# Patient Record
Sex: FEMALE | Race: BLACK OR AFRICAN AMERICAN | Employment: UNEMPLOYED | URBAN - METROPOLITAN AREA
[De-identification: names, ages, dates, MRNs, and addresses within clinical notes are randomized per-mention and may not be internally consistent; named-entity substitution may affect disease eponyms.]

---

## 2017-03-13 ENCOUNTER — APPOINTMENT (OUTPATIENT)
Dept: GENERAL RADIOLOGY | Age: 42
End: 2017-03-13
Attending: EMERGENCY MEDICINE
Payer: MEDICARE

## 2017-03-13 ENCOUNTER — HOSPITAL ENCOUNTER (EMERGENCY)
Age: 42
Discharge: HOME | End: 2017-03-13
Attending: EMERGENCY MEDICINE
Payer: MEDICARE

## 2017-03-13 VITALS
BODY MASS INDEX: 33.75 KG/M2 | RESPIRATION RATE: 16 BRPM | WEIGHT: 210 LBS | SYSTOLIC BLOOD PRESSURE: 144 MMHG | HEIGHT: 66 IN | TEMPERATURE: 98.7 F | DIASTOLIC BLOOD PRESSURE: 88 MMHG | HEART RATE: 136 BPM | OXYGEN SATURATION: 96 %

## 2017-03-13 DIAGNOSIS — K08.89 TOOTHACHE: Primary | ICD-10-CM

## 2017-03-13 DIAGNOSIS — R07.9 ACUTE CHEST PAIN: ICD-10-CM

## 2017-03-13 LAB
ALBUMIN SERPL BCP-MCNC: 3.9 G/DL (ref 3.5–5)
ALBUMIN/GLOB SERPL: 1 {RATIO} (ref 1.1–2.2)
ALP SERPL-CCNC: 78 U/L (ref 45–117)
ALT SERPL-CCNC: 27 U/L (ref 12–78)
ANION GAP BLD CALC-SCNC: 8 MMOL/L (ref 5–15)
AST SERPL W P-5'-P-CCNC: 22 U/L (ref 15–37)
ATRIAL RATE: 96 BPM
BASOPHILS # BLD AUTO: 0 K/UL (ref 0–0.1)
BASOPHILS # BLD: 0 % (ref 0–1)
BILIRUB SERPL-MCNC: 0.4 MG/DL (ref 0.2–1)
BUN SERPL-MCNC: 7 MG/DL (ref 6–20)
BUN/CREAT SERPL: 9 (ref 12–20)
CALCIUM SERPL-MCNC: 8.9 MG/DL (ref 8.5–10.1)
CALCULATED P AXIS, ECG09: 45 DEGREES
CALCULATED R AXIS, ECG10: -24 DEGREES
CALCULATED T AXIS, ECG11: 73 DEGREES
CHLORIDE SERPL-SCNC: 97 MMOL/L (ref 97–108)
CK MB CFR SERPL CALC: 0.5 % (ref 0–2.5)
CK MB SERPL-MCNC: 1.4 NG/ML (ref 5–25)
CK SERPL-CCNC: 273 U/L (ref 26–192)
CO2 SERPL-SCNC: 29 MMOL/L (ref 21–32)
CREAT SERPL-MCNC: 0.77 MG/DL (ref 0.55–1.02)
DIAGNOSIS, 93000: NORMAL
EOSINOPHIL # BLD: 0.1 K/UL (ref 0–0.4)
EOSINOPHIL NFR BLD: 1 % (ref 0–7)
ERYTHROCYTE [DISTWIDTH] IN BLOOD BY AUTOMATED COUNT: 12.3 % (ref 11.5–14.5)
GLOBULIN SER CALC-MCNC: 4.1 G/DL (ref 2–4)
GLUCOSE SERPL-MCNC: 100 MG/DL (ref 65–100)
HCT VFR BLD AUTO: 41.4 % (ref 35–47)
HGB BLD-MCNC: 13.2 G/DL (ref 11.5–16)
LYMPHOCYTES # BLD AUTO: 39 % (ref 12–49)
LYMPHOCYTES # BLD: 2.5 K/UL (ref 0.8–3.5)
MCH RBC QN AUTO: 28.3 PG (ref 26–34)
MCHC RBC AUTO-ENTMCNC: 31.9 G/DL (ref 30–36.5)
MCV RBC AUTO: 88.7 FL (ref 80–99)
MONOCYTES # BLD: 0.3 K/UL (ref 0–1)
MONOCYTES NFR BLD AUTO: 5 % (ref 5–13)
NEUTS SEG # BLD: 3.5 K/UL (ref 1.8–8)
NEUTS SEG NFR BLD AUTO: 55 % (ref 32–75)
P-R INTERVAL, ECG05: 202 MS
PLATELET # BLD AUTO: 263 K/UL (ref 150–400)
POTASSIUM SERPL-SCNC: 2.9 MMOL/L (ref 3.5–5.1)
PROT SERPL-MCNC: 8 G/DL (ref 6.4–8.2)
Q-T INTERVAL, ECG07: 356 MS
QRS DURATION, ECG06: 90 MS
QTC CALCULATION (BEZET), ECG08: 449 MS
RBC # BLD AUTO: 4.67 M/UL (ref 3.8–5.2)
SODIUM SERPL-SCNC: 134 MMOL/L (ref 136–145)
TROPONIN I SERPL-MCNC: <0.04 NG/ML
VENTRICULAR RATE, ECG03: 96 BPM
WBC # BLD AUTO: 6.4 K/UL (ref 3.6–11)

## 2017-03-13 PROCEDURE — 82550 ASSAY OF CK (CPK): CPT | Performed by: EMERGENCY MEDICINE

## 2017-03-13 PROCEDURE — 80053 COMPREHEN METABOLIC PANEL: CPT | Performed by: EMERGENCY MEDICINE

## 2017-03-13 PROCEDURE — 99285 EMERGENCY DEPT VISIT HI MDM: CPT

## 2017-03-13 PROCEDURE — 93005 ELECTROCARDIOGRAM TRACING: CPT

## 2017-03-13 PROCEDURE — 85025 COMPLETE CBC W/AUTO DIFF WBC: CPT | Performed by: EMERGENCY MEDICINE

## 2017-03-13 PROCEDURE — 36415 COLL VENOUS BLD VENIPUNCTURE: CPT | Performed by: EMERGENCY MEDICINE

## 2017-03-13 PROCEDURE — 74011250637 HC RX REV CODE- 250/637: Performed by: EMERGENCY MEDICINE

## 2017-03-13 PROCEDURE — 71020 XR CHEST PA LAT: CPT

## 2017-03-13 PROCEDURE — 84484 ASSAY OF TROPONIN QUANT: CPT | Performed by: EMERGENCY MEDICINE

## 2017-03-13 PROCEDURE — 82553 CREATINE MB FRACTION: CPT | Performed by: EMERGENCY MEDICINE

## 2017-03-13 RX ORDER — HYDROCODONE BITARTRATE AND ACETAMINOPHEN 5; 325 MG/1; MG/1
1 TABLET ORAL
Qty: 20 TAB | Refills: 0 | Status: SHIPPED | OUTPATIENT
Start: 2017-03-13 | End: 2018-12-24

## 2017-03-13 RX ORDER — TRAZODONE HYDROCHLORIDE 150 MG/1
150 TABLET ORAL
COMMUNITY

## 2017-03-13 RX ORDER — AZITHROMYCIN 250 MG/1
250 TABLET, FILM COATED ORAL DAILY
COMMUNITY
Start: 2017-03-06 | End: 2017-03-14

## 2017-03-13 RX ORDER — AMLODIPINE BESYLATE 5 MG/1
5 TABLET ORAL 2 TIMES DAILY
COMMUNITY
End: 2021-09-14 | Stop reason: CLARIF

## 2017-03-13 RX ORDER — HYDROCODONE BITARTRATE AND ACETAMINOPHEN 7.5; 325 MG/1; MG/1
1 TABLET ORAL
Status: COMPLETED | OUTPATIENT
Start: 2017-03-13 | End: 2017-03-13

## 2017-03-13 RX ADMIN — HYDROCODONE BITARTRATE AND ACETAMINOPHEN 1 TABLET: 7.5; 325 TABLET ORAL at 16:37

## 2017-03-13 NOTE — ED PROVIDER NOTES
HPI Comments: 40 y.o. female with past medical history significant for HTN, seizures, murmur, and cholecystectomy who presents from home with chief complaint of chest pain. Pt c/o L sided chest pain that lasts about 10 minutes followed by  heart racing and a pain that shoots up into her head causing a HA. Pt also c/o bilateral dental pain on her upper and lower molars, back pain, and a tingling sensation in her R arm. Pt was seen at [facility] a week ago and dx with hypertension. Pt claims she's been taking zpak for her teeth, but it hasn't helped with the pain. Pt was seen at [facility] 2 weeks ago and had x-rays taken but hasn't been called back yet. Pt has been taking 800 mg ibuprofen and PM headache medicine for her pain. Pt claims her clonodine was recently changed from 3mg to 1 mg, and denies filling her medication for her heart prescribed at [facility]. Pt denies seeing or talking to her PCP about her current symptoms. There are no other acute medical concerns at this time. PCP: Lois Robert MD    Note written by Mindy Egan, as dictated by Santos Stinson MD 4:40 PM      The history is provided by the patient. No  was used. Past Medical History:   Diagnosis Date    Hypertension     Murmur     Seizures ([address]        Past Surgical History:   Procedure Laterality Date    HX CHOLECYSTECTOMY           History reviewed. No pertinent family history. Social History     Social History    Marital status: SINGLE     Spouse name: N/A    Number of children: N/A    Years of education: N/A     Occupational History    Not on file. Social History Main Topics    Smoking status: Current Every Day Smoker    Smokeless tobacco: Not on file    Alcohol use Yes    Drug use: No    Sexual activity: Not on file     Other Topics Concern    Not on file     Social History Narrative         ALLERGIES: Review of patient's allergies indicates no known allergies.     Review of Systems   Constitutional: Negative for appetite change, chills and fever. HENT: Positive for dental problem (Teeth pain upper and lower bilaterally). Negative for rhinorrhea, sore throat and trouble swallowing. Eyes: Negative for photophobia. Respiratory: Negative for cough and shortness of breath. Cardiovascular: Positive for chest pain (Stabbing, heart racing). Negative for palpitations. Gastrointestinal: Negative for abdominal pain, nausea and vomiting. Genitourinary: Negative for dysuria, frequency and hematuria. Musculoskeletal: Positive for back pain. Negative for arthralgias. Neurological: Positive for headaches. Negative for dizziness, syncope and weakness. Tingling in R arm   Psychiatric/Behavioral: Negative for behavioral problems. The patient is not nervous/anxious. All other systems reviewed and are negative. Vitals:    03/13/17 1435   BP: 132/84   Pulse: (!) 136   Resp: 16   Temp: 98.7 °F (37.1 °C)   SpO2: 97%   Weight: 95.3 kg (210 lb)   Height: 5' 6\" (1.676 m)            Physical Exam   Constitutional: She appears well-developed and well-nourished. No distress. Appears to be in pain but no distress   HENT:   Head: Normocephalic and atraumatic. Mouth/Throat: Oropharynx is clear and moist.   Necrotic roots of R upper and lower proximal molars and L upper molars showing   Eyes: EOM are normal. Pupils are equal, round, and reactive to light. Neck: Normal range of motion. Neck supple. Cardiovascular: Normal rate, regular rhythm, normal heart sounds and intact distal pulses. Exam reveals no gallop and no friction rub. No murmur heard. Pulmonary/Chest: Effort normal. No respiratory distress. She has no wheezes. She has no rales. Abdominal: Soft. There is no tenderness. There is no rebound. Musculoskeletal: Normal range of motion. She exhibits no tenderness. Neurological: She is alert. No cranial nerve deficit. Motor; symmetric   Skin: No erythema. Psychiatric: She has a normal mood and affect. Her behavior is normal.   Nursing note and vitals reviewed. [facility]  ED Course       Procedures      ED EKG interpretation:  Rhythm: normal sinus rhythm; and regular . Rate (approx.): 95; Axis: normal; P wave: normal; QRS interval: normal ; ST/T wave: non-specific changes; in  Lead: ; Other findings: left ventricular hypertrophy. This EKG was interpreted by Enzo Putnam MD,ED Provider.  4:13 PM

## 2017-03-13 NOTE — ED TRIAGE NOTES
EMS was called for pt complaining of left chest pain with radiation to her right chest wall and down her right lateral chest wall with increased pain on inspiration. Pt is coughing up green sputum. Pt was seen at [facility] this past week with diagnosis of headache and palpitations and tooth abscess and has not taken any of the prescribed medications. Pt has a white vaginal discharge with urinary frequency that has started this past week.

## 2017-03-13 NOTE — ED NOTES
Pt upset & crying & not wanting to leave hospital because she \"wants something stronger for pain\". Spoke with Dr. Marte Fear & no further orders received. Pt given prescription for Norco & needs to follow up with dentist. Resources given.

## 2017-03-13 NOTE — DISCHARGE INSTRUCTIONS
Chest Pain: Care Instructions  Your Care Instructions  There are many things that can cause chest pain. Some are not serious and will get better on their own in a few days. But some kinds of chest pain need more testing and treatment. Your doctor may have recommended a follow-up visit in the next 8 to 12 hours. If you are not getting better, you may need more tests or treatment. Even though your doctor has released you, you still need to watch for any problems. The doctor carefully checked you, but sometimes problems can develop later. If you have new symptoms or if your symptoms do not get better, get medical care right away. If you have worse or different chest pain or pressure that lasts more than 5 minutes or you passed out (lost consciousness), call 911 or seek other emergency help right away. A medical visit is only one step in your treatment. Even if you feel better, you still need to do what your doctor recommends, such as going to all suggested follow-up appointments and taking medicines exactly as directed. This will help you recover and help prevent future problems. How can you care for yourself at home? · Rest until you feel better. · Take your medicine exactly as prescribed. Call your doctor if you think you are having a problem with your medicine. · Do not drive after taking a prescription pain medicine. When should you call for help? Call 911 if:  · You passed out (lost consciousness). · You have severe difficulty breathing. · You have symptoms of a heart attack. These may include:  ¨ Chest pain or pressure, or a strange feeling in your chest.  ¨ Sweating. ¨ Shortness of breath. ¨ Nausea or vomiting. ¨ Pain, pressure, or a strange feeling in your back, neck, jaw, or upper belly or in one or both shoulders or arms. ¨ Lightheadedness or sudden weakness. ¨ A fast or irregular heartbeat.   After you call 911, the  may tell you to chew 1 adult-strength or 2 to 4 low-dose aspirin. Wait for an ambulance. Do not try to drive yourself. Call your doctor today if:  · You have any trouble breathing. · Your chest pain gets worse. · You are dizzy or lightheaded, or you feel like you may faint. · You are not getting better as expected. · You are having new or different chest pain. Where can you learn more? Go to http://fiona-pamela.info/. Enter A120 in the search box to learn more about \"Chest Pain: Care Instructions. \"  Current as of: May 27, 2016  Content Version: 11.1  © 9771-6692 [employer]. Care instructions adapted under license by [employer] (which disclaims liability or warranty for this information). If you have questions about a medical condition or this instruction, always ask your healthcare professional. [address] any warranty or liability for your use of this information. We hope that we have addressed all of your medical concerns. The examination and treatment you received in the Emergency Department were for an emergent problem and were not intended as complete care. It is important that you follow up with your healthcare provider(s) for ongoing care. If your symptoms worsen or do not improve as expected, and you are unable to reach your usual health care provider(s), you should return to the Emergency Department. Today's healthcare is undergoing tremendous change, and patient satisfaction surveys are one of the many tools to assess the quality of medical care. You may receive a survey from the [employer] organization regarding your experience in the Emergency Department. I hope that your experience has been completely positive, particularly the medical care that I provided. As such, please participate in the survey; anything less than excellent does not meet my expectations or intentions.         [facility] and [facility] participate in nationally recognized quality of care measures. If your blood pressure is greater than 120/80, as reported below, we urge that you seek medical care to address the potential of high blood pressure, commonly known as hypertension. Hypertension can be hereditary or can be caused by certain medical conditions, pain, stress, or \"white coat syndrome. \"       Please make an appointment with your health care provider(s) for follow up of your Emergency Department visit. VITALS:   Patient Vitals for the past 8 hrs:   Temp Pulse Resp BP SpO2   03/13/17 1435 98.7 °F (37.1 °C) (!) 136 16 132/84 97 %          Thank you for allowing us to provide you with medical care today. We realize that you have many choices for your emergency care needs. Please choose us in the future for any continued health care needs. Tulio Diaz MD    [address] [place] [address].   Office: [phone]            Recent Results (from the past 24 hour(s))   TROPONIN I    Collection Time: 03/13/17  2:46 PM   Result Value Ref Range    Troponin-I, Qt. <0.04 <0.05 ng/mL   CK W/ REFLX CKMB    Collection Time: 03/13/17  2:46 PM   Result Value Ref Range     (H) 26 - 192 U/L   CBC WITH AUTOMATED DIFF    Collection Time: 03/13/17  2:46 PM   Result Value Ref Range    WBC 6.4 3.6 - 11.0 K/uL    RBC 4.67 3.80 - 5.20 M/uL    HGB 13.2 11.5 - 16.0 g/dL    HCT 41.4 35.0 - 47.0 %    MCV 88.7 80.0 - 99.0 FL    MCH 28.3 26.0 - 34.0 PG    MCHC 31.9 30.0 - 36.5 g/dL    RDW 12.3 11.5 - 14.5 %    PLATELET 297 063 - 972 K/uL    NEUTROPHILS 55 32 - 75 %    LYMPHOCYTES 39 12 - 49 %    MONOCYTES 5 5 - 13 %    EOSINOPHILS 1 0 - 7 %    BASOPHILS 0 0 - 1 %    ABS. NEUTROPHILS 3.5 1.8 - 8.0 K/UL    ABS. LYMPHOCYTES 2.5 0.8 - 3.5 K/UL    ABS. MONOCYTES 0.3 0.0 - 1.0 K/UL    ABS. EOSINOPHILS 0.1 0.0 - 0.4 K/UL    ABS.  BASOPHILS 0.0 0.0 - 0.1 K/UL   METABOLIC PANEL, COMPREHENSIVE    Collection Time: 03/13/17  2:46 PM   Result Value Ref Range    Sodium 134 (L) 136 - 145 mmol/L    Potassium 2.9 (L) 3.5 - 5.1 mmol/L    Chloride 97 97 - 108 mmol/L    CO2 29 21 - 32 mmol/L    Anion gap 8 5 - 15 mmol/L    Glucose 100 65 - 100 mg/dL    BUN 7 6 - 20 MG/DL    Creatinine 0.77 0.55 - 1.02 MG/DL    BUN/Creatinine ratio 9 (L) 12 - 20      GFR est AA >60 >60 ml/min/1.73m2    GFR est non-AA >60 >60 ml/min/1.73m2    Calcium 8.9 8.5 - 10.1 MG/DL    Bilirubin, total 0.4 0.2 - 1.0 MG/DL    ALT (SGPT) 27 12 - 78 U/L    AST (SGOT) 22 15 - 37 U/L    Alk. phosphatase 78 45 - 117 U/L    Protein, total 8.0 6.4 - 8.2 g/dL    Albumin 3.9 3.5 - 5.0 g/dL    Globulin 4.1 (H) 2.0 - 4.0 g/dL    A-G Ratio 1.0 (L) 1.1 - 2.2     CK-MB,QUANT. Collection Time: 03/13/17  2:46 PM   Result Value Ref Range    CK - MB 1.4 <3.6 NG/ML    CK-MB Index 0.5 0 - 2.5     EKG, 12 LEAD, INITIAL    Collection Time: 03/13/17  2:50 PM   Result Value Ref Range    Ventricular Rate 96 BPM    Atrial Rate 96 BPM    P-R Interval 202 ms    QRS Duration 90 ms    Q-T Interval 356 ms    QTC Calculation (Bezet) 449 ms    Calculated P Axis 45 degrees    Calculated R Axis -24 degrees    Calculated T Axis 73 degrees    Diagnosis       Normal sinus rhythm  Voltage criteria for left ventricular hypertrophy  Nonspecific T wave abnormality  No previous ECGs available  Confirmed by Eduarda Zelaya M.D., [facility] ([id number]) on 3/13/2017 4:01:06 PM         Xr Chest Pa Lat    Result Date: 3/13/2017  EXAM:  XR CHEST PA LAT INDICATION:   cough, chest pain COMPARISON: None. FINDINGS: PA and lateral radiographs of the chest demonstrate clear lungs. The cardiac and mediastinal contours and pulmonary vascularity are normal.  The bones and soft tissues are within normal limits. IMPRESSION: No acute abnormality is identified.

## 2018-12-23 ENCOUNTER — HOSPITAL ENCOUNTER (EMERGENCY)
Age: 43
Discharge: HOME | End: 2018-12-23
Attending: EMERGENCY MEDICINE | Admitting: EMERGENCY MEDICINE
Payer: MEDICARE

## 2018-12-23 VITALS
TEMPERATURE: 98 F | BODY MASS INDEX: 40.75 KG/M2 | WEIGHT: 230 LBS | OXYGEN SATURATION: 100 % | HEART RATE: 84 BPM | SYSTOLIC BLOOD PRESSURE: 135 MMHG | HEIGHT: 63 IN | DIASTOLIC BLOOD PRESSURE: 84 MMHG | RESPIRATION RATE: 18 BRPM

## 2018-12-23 DIAGNOSIS — R55 SYNCOPE AND COLLAPSE: Primary | ICD-10-CM

## 2018-12-23 LAB
ALBUMIN SERPL-MCNC: 4.2 G/DL (ref 3.5–5)
ALBUMIN/GLOB SERPL: 1 {RATIO} (ref 1.1–2.2)
ALP SERPL-CCNC: 80 U/L (ref 45–117)
ALT SERPL-CCNC: 33 U/L (ref 12–78)
ANION GAP SERPL CALC-SCNC: 14 MMOL/L (ref 5–15)
APPEARANCE UR: CLEAR
AST SERPL-CCNC: 26 U/L (ref 15–37)
ATRIAL RATE: 67 BPM
BACTERIA URNS QL MICRO: NEGATIVE /HPF
BASOPHILS # BLD: 0 K/UL (ref 0–0.1)
BASOPHILS NFR BLD: 1 % (ref 0–1)
BILIRUB SERPL-MCNC: 0.3 MG/DL (ref 0.2–1)
BILIRUB UR QL: NEGATIVE
BUN SERPL-MCNC: 7 MG/DL (ref 6–20)
BUN/CREAT SERPL: 8 (ref 12–20)
CALCIUM SERPL-MCNC: 9.5 MG/DL (ref 8.5–10.1)
CALCULATED P AXIS, ECG09: 31 DEGREES
CALCULATED R AXIS, ECG10: -8 DEGREES
CALCULATED T AXIS, ECG11: 28 DEGREES
CHLORIDE SERPL-SCNC: 102 MMOL/L (ref 97–108)
CK SERPL-CCNC: 191 U/L (ref 26–192)
CO2 SERPL-SCNC: 23 MMOL/L (ref 21–32)
COLOR UR: ABNORMAL
CREAT SERPL-MCNC: 0.89 MG/DL (ref 0.55–1.02)
DIAGNOSIS, 93000: NORMAL
DIFFERENTIAL METHOD BLD: NORMAL
EOSINOPHIL # BLD: 0.1 K/UL (ref 0–0.4)
EOSINOPHIL NFR BLD: 1 % (ref 0–7)
EPITH CASTS URNS QL MICRO: ABNORMAL /LPF
ERYTHROCYTE [DISTWIDTH] IN BLOOD BY AUTOMATED COUNT: 12 % (ref 11.5–14.5)
GLOBULIN SER CALC-MCNC: 4.1 G/DL (ref 2–4)
GLUCOSE SERPL-MCNC: 102 MG/DL (ref 65–100)
GLUCOSE UR STRIP.AUTO-MCNC: NEGATIVE MG/DL
HCG UR QL: NEGATIVE
HCT VFR BLD AUTO: 37.4 % (ref 35–47)
HGB BLD-MCNC: 12.9 G/DL (ref 11.5–16)
HGB UR QL STRIP: ABNORMAL
IMM GRANULOCYTES # BLD: 0 K/UL (ref 0–0.04)
IMM GRANULOCYTES NFR BLD AUTO: 0 % (ref 0–0.5)
KETONES UR QL STRIP.AUTO: NEGATIVE MG/DL
LEUKOCYTE ESTERASE UR QL STRIP.AUTO: NEGATIVE
LIPASE SERPL-CCNC: 78 U/L (ref 73–393)
LYMPHOCYTES # BLD: 2.7 K/UL (ref 0.8–3.5)
LYMPHOCYTES NFR BLD: 31 % (ref 12–49)
MCH RBC QN AUTO: 29.8 PG (ref 26–34)
MCHC RBC AUTO-ENTMCNC: 34.5 G/DL (ref 30–36.5)
MCV RBC AUTO: 86.4 FL (ref 80–99)
MONOCYTES # BLD: 0.4 K/UL (ref 0–1)
MONOCYTES NFR BLD: 5 % (ref 5–13)
NEUTS SEG # BLD: 5.4 K/UL (ref 1.8–8)
NEUTS SEG NFR BLD: 63 % (ref 32–75)
NITRITE UR QL STRIP.AUTO: NEGATIVE
NRBC # BLD: 0 K/UL (ref 0–0.01)
NRBC BLD-RTO: 0 PER 100 WBC
P-R INTERVAL, ECG05: 222 MS
PH UR STRIP: 6 [PH] (ref 5–8)
PLATELET # BLD AUTO: 335 K/UL (ref 150–400)
PMV BLD AUTO: 11 FL (ref 8.9–12.9)
POTASSIUM SERPL-SCNC: 3.6 MMOL/L (ref 3.5–5.1)
PROT SERPL-MCNC: 8.3 G/DL (ref 6.4–8.2)
PROT UR STRIP-MCNC: NEGATIVE MG/DL
Q-T INTERVAL, ECG07: 408 MS
QRS DURATION, ECG06: 90 MS
QTC CALCULATION (BEZET), ECG08: 431 MS
RBC # BLD AUTO: 4.33 M/UL (ref 3.8–5.2)
RBC #/AREA URNS HPF: ABNORMAL /HPF (ref 0–5)
SODIUM SERPL-SCNC: 139 MMOL/L (ref 136–145)
SP GR UR REFRACTOMETRY: <1.005 (ref 1–1.03)
UA: UC IF INDICATED,UAUC: ABNORMAL
UROBILINOGEN UR QL STRIP.AUTO: 0.2 EU/DL (ref 0.2–1)
VENTRICULAR RATE, ECG03: 67 BPM
WBC # BLD AUTO: 8.7 K/UL (ref 3.6–11)
WBC URNS QL MICRO: ABNORMAL /HPF (ref 0–4)

## 2018-12-23 PROCEDURE — 81001 URINALYSIS AUTO W/SCOPE: CPT

## 2018-12-23 PROCEDURE — 80053 COMPREHEN METABOLIC PANEL: CPT

## 2018-12-23 PROCEDURE — 74011250637 HC RX REV CODE- 250/637: Performed by: EMERGENCY MEDICINE

## 2018-12-23 PROCEDURE — 74011250636 HC RX REV CODE- 250/636: Performed by: EMERGENCY MEDICINE

## 2018-12-23 PROCEDURE — 93005 ELECTROCARDIOGRAM TRACING: CPT

## 2018-12-23 PROCEDURE — 85025 COMPLETE CBC W/AUTO DIFF WBC: CPT

## 2018-12-23 PROCEDURE — 83690 ASSAY OF LIPASE: CPT

## 2018-12-23 PROCEDURE — 96360 HYDRATION IV INFUSION INIT: CPT

## 2018-12-23 PROCEDURE — 36415 COLL VENOUS BLD VENIPUNCTURE: CPT

## 2018-12-23 PROCEDURE — 99285 EMERGENCY DEPT VISIT HI MDM: CPT

## 2018-12-23 PROCEDURE — 82550 ASSAY OF CK (CPK): CPT

## 2018-12-23 PROCEDURE — 81025 URINE PREGNANCY TEST: CPT

## 2018-12-23 RX ORDER — BUTALBITAL, ACETAMINOPHEN AND CAFFEINE 50; 325; 40 MG/1; MG/1; MG/1
1 TABLET ORAL
Status: COMPLETED | OUTPATIENT
Start: 2018-12-23 | End: 2018-12-23

## 2018-12-23 RX ORDER — MECLIZINE HCL 12.5 MG 12.5 MG/1
25 TABLET ORAL
Status: COMPLETED | OUTPATIENT
Start: 2018-12-23 | End: 2018-12-23

## 2018-12-23 RX ADMIN — SODIUM CHLORIDE 1000 ML: 900 INJECTION, SOLUTION INTRAVENOUS at 14:50

## 2018-12-23 RX ADMIN — BUTALBITAL, ACETAMINOPHEN, AND CAFFEINE 1 TABLET: 50; 325; 40 TABLET ORAL at 15:39

## 2018-12-23 RX ADMIN — MECLIZINE 25 MG: 12.5 TABLET ORAL at 16:42

## 2018-12-23 NOTE — ED TRIAGE NOTES
EMS reports called for a witnessed seizure. Patient says she had a seizure lasting \"2 minutes\" and witnessed by a friend. Was \"almost\" incont. States stopped taking Dilantin four years ago. When asked says seizures occur with \"stress\". States has a cold this week. Asking for a meal because she has not eaten \"in three days\" due to diarrhea.

## 2018-12-23 NOTE — ED NOTES
Patient resting on stretcher crying. Reports dizzy sensation when ambulating though able to ambulate to rest room without difficulty.   MD notified

## 2018-12-23 NOTE — ED NOTES

## 2018-12-23 NOTE — DISCHARGE INSTRUCTIONS
Lightheadedness or Faintness: Care Instructions  Your Care Instructions  Lightheadedness is a feeling that you are about to faint or \"pass out. \" You do not feel as if you or your surroundings are moving. It is different from vertigo, which is the feeling that you or things around you are spinning or tilting. Lightheadedness usually goes away or gets better when you lie down. If lightheadedness gets worse, it can lead to a fainting spell. It is common to feel lightheaded from time to time. Lightheadedness usually is not caused by a serious problem. It often is caused by a short-lasting drop in blood pressure and blood flow to your head that occurs when you get up too quickly from a seated or lying position. Follow-up care is a key part of your treatment and safety. Be sure to make and go to all appointments, and call your doctor if you are having problems. It's also a good idea to know your test results and keep a list of the medicines you take. How can you care for yourself at home? · Lie down for 1 or 2 minutes when you feel lightheaded. After lying down, sit up slowly and remain sitting for 1 to 2 minutes before slowly standing up. · Avoid movements, positions, or activities that have made you lightheaded in the past.  · Get plenty of rest, especially if you have a cold or flu, which can cause lightheadedness. · Make sure you drink plenty of fluids, especially if you have a fever or have been sweating. · Do not drive or put yourself and others in danger while you feel lightheaded. When should you call for help? Call 911 anytime you think you may need emergency care. For example, call if:    · You have symptoms of a stroke. These may include:  ? Sudden numbness, tingling, weakness, or loss of movement in your face, arm, or leg, especially on only one side of your body. ? Sudden vision changes. ? Sudden trouble speaking. ? Sudden confusion or trouble understanding simple statements.   ? Sudden problems with walking or balance. ? A sudden, severe headache that is different from past headaches.     · You have symptoms of a heart attack. These may include:  ? Chest pain or pressure, or a strange feeling in the chest.  ? Sweating. ? Shortness of breath. ? Nausea or vomiting. ? Pain, pressure, or a strange feeling in the back, neck, jaw, or upper belly or in one or both shoulders or arms. ? Lightheadedness or sudden weakness. ? A fast or irregular heartbeat. After you call 911, the  may tell you to chew 1 adult-strength or 2 to 4 low-dose aspirin. Wait for an ambulance. Do not try to drive yourself.    Watch closely for changes in your health, and be sure to contact your doctor if:    · Your lightheadedness gets worse or does not get better with home care. Where can you learn more? Go to http://fiona-pamela.info/. Enter Z718 in the search box to learn more about \"Lightheadedness or Faintness: Care Instructions. \"  Current as of: November 20, 2017  Content Version: 11.8  © 9245-5247 [employer]. Care instructions adapted under license by [employer] (which disclaims liability or warranty for this information). If you have questions about a medical condition or this instruction, always ask your healthcare professional. [address] any warranty or liability for your use of this information.

## 2018-12-23 NOTE — ED PROVIDER NOTES
EMERGENCY DEPARTMENT HISTORY AND PHYSICAL EXAM      Date: 12/23/2018  Patient Name: Luz Montez    History of Presenting Illness     Chief Complaint   Patient presents with    Seizure       History Provided By: Patient    HPI: Luz Montez, 44 y.o. female with PMHx significant for headaches, HTN, heart murmurs and ?seizures, presents ambulatory to the ED with cc of seizure occurring today along with associated lightheadedness, headache, and photophobia. Pt also cc of intermittent, nausea/vomiting/diarrhea beginning two weeks ago. Per chart review, pt was seen three times at [facility] in the past week. She was evaluated yesterday for CP and headache with normal lab work and head CT. Additionally, she was seen for similar sxs x 12/17/2018 and 12/18/2018. She reports her friend witnessed her seizing after coming out of the bathroom. She states they did not tell her how long it lasted. She admits to not taking her seizure medication, noting she has not had one \"in years\". She states she stopped seeing her neurologist as well. She notes that when she came out of the bathroom, she was feeling lightheaded and lost consciousness. She states her friend reported she had some shaking. She denies bladder incontinence or tongue laceration and was not confused following the episode. She endorses OTC medication for her headache with no relief. She denies any other alleviating or exacerbating factors. Pt specifically denies any LOC, tongue injury, urinary incontinence, phonophobia, vision changes, slurred speech, facial asymmetry, gait problem, weakness or numbness. There are no other complaints, changes, or physical findings at this time.     PCP: Miguel Estrada MD    Current Facility-Administered Medications   Medication Dose Route Frequency Provider Last Rate Last Dose    butalbital-acetaminophen-caffeine (FIORICET, ESGIC) -40 mg per tablet 1 Tab  1 Tab Oral NOW Martine Christopher MD         Current Outpatient Medications Medication Sig Dispense Refill    traZODone (DESYREL) 150 mg tablet Take 150 mg by mouth nightly.  amLODIPine (NORVASC) 5 mg tablet Take 5 mg by mouth two (2) times a day.  HYDROcodone-acetaminophen (NORCO) 5-325 mg per tablet Take 1 Tab by mouth every four (4) hours as needed for Pain. Max Daily Amount: 6 Tabs. 20 Tab 0    cloNIDine HCl (CATAPRES) 0.3 mg tablet Take 1 Tab by mouth two (2) times a day. 60 Tab 0    promethazine (PHENERGAN) 25 mg tablet Take 1 Tab by mouth every six (6) hours as needed for Nausea. 10 Tab 0    methadone (DOLOPHINE) 10 mg tablet Take 60 mg by mouth daily.  diphenhydrAMINE-acetaminophen  mg tab Take  by mouth as needed. Past History     Past Medical History:  Past Medical History:   Diagnosis Date    Hypertension     Murmur     Seizures ([facility] [address]        Past Surgical History:  Past Surgical History:   Procedure Laterality Date    HX CHOLECYSTECTOMY         Family History:  No family history on file. Social History:  Social History     Tobacco Use    Smoking status: Current Every Day Smoker   Substance Use Topics    Alcohol use: Yes    Drug use: No       Allergies: Allergies   Allergen Reactions    Penicillin G Hoarseness         Review of Systems   Review of Systems   Constitutional: Negative for chills and fever. HENT: Negative for congestion, rhinorrhea and sore throat. Eyes: Positive for photophobia. Respiratory: Negative for cough and shortness of breath. Cardiovascular: Negative for chest pain. Gastrointestinal: Positive for diarrhea, nausea and vomiting. Negative for abdominal pain. Genitourinary: Negative for dysuria and urgency. Skin: Negative for rash. Neurological: Positive for light-headedness and headaches. Negative for dizziness. All other systems reviewed and are negative. Physical Exam   Physical Exam   Constitutional: She is oriented to person, place, and time.  She appears well-developed and well-nourished. No distress. HENT:   Head: Normocephalic and atraumatic. No tongue laceration   Eyes: Conjunctivae and EOM are normal. Pupils are equal, round, and reactive to light. Neck: Normal range of motion. Cardiovascular: Normal rate, regular rhythm and intact distal pulses. Pulmonary/Chest: Effort normal and breath sounds normal. No stridor. No respiratory distress. Abdominal: Soft. She exhibits no distension. There is no tenderness. Musculoskeletal: Normal range of motion. Neurological: She is alert and oriented to person, place, and time. She has normal strength. No cranial nerve deficit or sensory deficit. Coordination and gait normal.   Skin: Skin is warm and dry. Psychiatric: She has a normal mood and affect. Nursing note and vitals reviewed. Diagnostic Study Results     Labs -     Recent Results (from the past 12 hour(s))   CBC WITH AUTOMATED DIFF    Collection Time: 12/23/18  1:26 PM   Result Value Ref Range    WBC 8.7 3.6 - 11.0 K/uL    RBC 4.33 3.80 - 5.20 M/uL    HGB 12.9 11.5 - 16.0 g/dL    HCT 37.4 35.0 - 47.0 %    MCV 86.4 80.0 - 99.0 FL    MCH 29.8 26.0 - 34.0 PG    MCHC 34.5 30.0 - 36.5 g/dL    RDW 12.0 11.5 - 14.5 %    PLATELET 152 458 - 426 K/uL    MPV 11.0 8.9 - 12.9 FL    NRBC 0.0 0  WBC    ABSOLUTE NRBC 0.00 0.00 - 0.01 K/uL    NEUTROPHILS 63 32 - 75 %    LYMPHOCYTES 31 12 - 49 %    MONOCYTES 5 5 - 13 %    EOSINOPHILS 1 0 - 7 %    BASOPHILS 1 0 - 1 %    IMMATURE GRANULOCYTES 0 0.0 - 0.5 %    ABS. NEUTROPHILS 5.4 1.8 - 8.0 K/UL    ABS. LYMPHOCYTES 2.7 0.8 - 3.5 K/UL    ABS. MONOCYTES 0.4 0.0 - 1.0 K/UL    ABS. EOSINOPHILS 0.1 0.0 - 0.4 K/UL    ABS. BASOPHILS 0.0 0.0 - 0.1 K/UL    ABS. IMM.  GRANS. 0.0 0.00 - 0.04 K/UL    DF AUTOMATED     METABOLIC PANEL, COMPREHENSIVE    Collection Time: 12/23/18  1:26 PM   Result Value Ref Range    Sodium 139 136 - 145 mmol/L    Potassium 3.6 3.5 - 5.1 mmol/L    Chloride 102 97 - 108 mmol/L    CO2 23 21 - 32 mmol/L    Anion gap 14 5 - 15 mmol/L    Glucose 102 (H) 65 - 100 mg/dL    BUN 7 6 - 20 MG/DL    Creatinine 0.89 0.55 - 1.02 MG/DL    BUN/Creatinine ratio 8 (L) 12 - 20      GFR est AA >60 >60 ml/min/1.73m2    GFR est non-AA >60 >60 ml/min/1.73m2    Calcium 9.5 8.5 - 10.1 MG/DL    Bilirubin, total 0.3 0.2 - 1.0 MG/DL    ALT (SGPT) 33 12 - 78 U/L    AST (SGOT) 26 15 - 37 U/L    Alk. phosphatase 80 45 - 117 U/L    Protein, total 8.3 (H) 6.4 - 8.2 g/dL    Albumin 4.2 3.5 - 5.0 g/dL    Globulin 4.1 (H) 2.0 - 4.0 g/dL    A-G Ratio 1.0 (L) 1.1 - 2.2     LIPASE    Collection Time: 12/23/18  1:26 PM   Result Value Ref Range    Lipase 78 73 - 393 U/L   URINALYSIS W/ REFLEX CULTURE    Collection Time: 12/23/18  1:26 PM   Result Value Ref Range    Color YELLOW/STRAW      Appearance CLEAR CLEAR      Specific gravity <1.005 1.003 - 1.030    pH (UA) 6.0 5.0 - 8.0      Protein NEGATIVE  NEG mg/dL    Glucose NEGATIVE  NEG mg/dL    Ketone NEGATIVE  NEG mg/dL    Bilirubin NEGATIVE  NEG      Blood MODERATE (A) NEG      Urobilinogen 0.2 0.2 - 1.0 EU/dL    Nitrites NEGATIVE  NEG      Leukocyte Esterase NEGATIVE  NEG      WBC 0-4 0 - 4 /hpf    RBC 5-10 0 - 5 /hpf    Epithelial cells FEW FEW /lpf    Bacteria NEGATIVE  NEG /hpf    UA:UC IF INDICATED CULTURE NOT INDICATED BY UA RESULT CNI     CK    Collection Time: 12/23/18  1:26 PM   Result Value Ref Range     26 - 192 U/L   HCG URINE, QL. - POC    Collection Time: 12/23/18  1:37 PM   Result Value Ref Range    Pregnancy test,urine (POC) NEGATIVE  NEG         Medical Decision Making   I am the first provider for this patient. I reviewed the vital signs, available nursing notes, past medical history, past surgical history, family history and social history. Vital Signs-Reviewed the patient's vital signs.   Patient Vitals for the past 12 hrs:   Temp Pulse Resp BP SpO2   12/23/18 1446  65 16 126/74 100 %   12/23/18 1238 98 °F (36.7 °C) 70 17 124/74 98 %       Records Reviewed: Nursing Notes and Old Medical Records     EKG interpretation: (Preliminary) 1555  Rhythm: normal sinus rhythm with 1st degree AV block; and regular . Rate (approx.): 67; Axis: normal; P wave: normal; QRS interval: normal ; ST/T wave: normal; Other findings: Minimal voltage for LVH, may be normal variant, non-ischemic. Written by Kamryn Munson ED Scribe, as dictated by Lit Martin MD.    Provider Notes (Medical Decision Making):   DDx: seizure vs syncope, dehydration, electrolyte imbalance, arrhythmia    Story sounds more consistent with syncope given no tongue biting, incontinence and no post-ictal period. Will check basic labs, UA, give IVF. Given normal head CT yesterday at [facility] with same sx today, will not repeat imaging    ED Course:   Initial assessment performed. The patients presenting problems have been discussed, and they are in agreement with the care plan formulated and outlined with them. I have encouraged them to ask questions as they arise throughout their visit. PROGRESS NOTE:   3:28 PM  Confirmed that pt had no postictal state after the seizure. She is still complaining of a headache after several treatments. She states she was given a prescription for Fioricet, however she is not able to fill it. She will be given one dose of it and discharged. After being discharged, patient told nursing she was feeling lightheaded. Normal orthostatics were performed, ekg with no arrhythmia, electrolytes wnl, patient had ambulated without difficulty to the bathroom and had a normal head CT yesterday. She is medically stable for discharge    Disposition:  DISCHARGE NOTE  3:30 PM  The patient has been re-evaluated and is ready for discharge. Reviewed available results with patient and family. Counseled pt and family on diagnosis and care plan. Pt and family have expressed understanding, and all questions have been answered.  Pt and family agree with plan and agree to F/U as recommended, or return to the ED if their sxs worsen. Discharge instructions have been provided and explained to the pt and their family, along with reasons to return to the ED. Written by Billie Spann, ED Scribe, as dictated by Mk Bates MD.    PLAN:  1. Current Discharge Medication List        2. Follow-up Information     Follow up With Specialties Details Why Contact Info    [facility]  Schedule an appointment as soon as possible for a visit  [address] [facility] [address]    Royer Callaway MD Internal Medicine Schedule an appointment as soon as possible for a visit  [address] [facility] [address] [facility] [phone]      [facility] EMERGENCY DEPT Emergency Medicine  As needed, If symptoms worsen [facility]  [phone]        Return to ED if worse     Diagnosis     Clinical Impression:   1. Syncope and collapse        Attestations: This note is prepared by Billie Spann, acting as Scribe for Mk Bates MD.    Mk Bates MD: The scribe's documentation has been prepared under my direction and personally reviewed by me in its entirety. I confirm that the note above accurately reflects all work, treatment, procedures, and medical decision making performed by me. This note will not be viewable in [address]

## 2018-12-24 ENCOUNTER — HOSPITAL ENCOUNTER (EMERGENCY)
Age: 43
Discharge: HOME | End: 2018-12-24
Attending: EMERGENCY MEDICINE
Payer: MEDICARE

## 2018-12-24 VITALS
HEART RATE: 62 BPM | HEIGHT: 66 IN | WEIGHT: 209 LBS | SYSTOLIC BLOOD PRESSURE: 137 MMHG | TEMPERATURE: 98.3 F | RESPIRATION RATE: 19 BRPM | BODY MASS INDEX: 33.59 KG/M2 | OXYGEN SATURATION: 100 % | DIASTOLIC BLOOD PRESSURE: 86 MMHG

## 2018-12-24 DIAGNOSIS — G44.209 ACUTE NON INTRACTABLE TENSION-TYPE HEADACHE: Primary | ICD-10-CM

## 2018-12-24 DIAGNOSIS — R42 LIGHTHEADEDNESS: ICD-10-CM

## 2018-12-24 LAB
ALBUMIN SERPL-MCNC: 4.3 G/DL (ref 3.5–5)
ALBUMIN/GLOB SERPL: 1 {RATIO} (ref 1.1–2.2)
ALP SERPL-CCNC: 79 U/L (ref 45–117)
ALT SERPL-CCNC: 30 U/L (ref 12–78)
ANION GAP SERPL CALC-SCNC: 15 MMOL/L (ref 5–15)
APPEARANCE UR: CLEAR
AST SERPL-CCNC: 18 U/L (ref 15–37)
BACTERIA URNS QL MICRO: NEGATIVE /HPF
BASOPHILS # BLD: 0 K/UL (ref 0–0.1)
BASOPHILS NFR BLD: 1 % (ref 0–1)
BILIRUB SERPL-MCNC: 0.2 MG/DL (ref 0.2–1)
BILIRUB UR QL: NEGATIVE
BUN SERPL-MCNC: 4 MG/DL (ref 6–20)
BUN/CREAT SERPL: 4 (ref 12–20)
CALCIUM SERPL-MCNC: 9.5 MG/DL (ref 8.5–10.1)
CHLORIDE SERPL-SCNC: 100 MMOL/L (ref 97–108)
CO2 SERPL-SCNC: 22 MMOL/L (ref 21–32)
COLOR UR: NORMAL
CREAT SERPL-MCNC: 0.91 MG/DL (ref 0.55–1.02)
DIFFERENTIAL METHOD BLD: NORMAL
EOSINOPHIL # BLD: 0.1 K/UL (ref 0–0.4)
EOSINOPHIL NFR BLD: 1 % (ref 0–7)
EPITH CASTS URNS QL MICRO: NORMAL /LPF
ERYTHROCYTE [DISTWIDTH] IN BLOOD BY AUTOMATED COUNT: 11.9 % (ref 11.5–14.5)
GLOBULIN SER CALC-MCNC: 4.1 G/DL (ref 2–4)
GLUCOSE BLD STRIP.AUTO-MCNC: 139 MG/DL (ref 65–100)
GLUCOSE SERPL-MCNC: 131 MG/DL (ref 65–100)
GLUCOSE UR STRIP.AUTO-MCNC: NEGATIVE MG/DL
HCG UR QL: NEGATIVE
HCT VFR BLD AUTO: 38.4 % (ref 35–47)
HGB BLD-MCNC: 13.2 G/DL (ref 11.5–16)
HGB UR QL STRIP: NEGATIVE
IMM GRANULOCYTES # BLD: 0 K/UL (ref 0–0.04)
IMM GRANULOCYTES NFR BLD AUTO: 0 % (ref 0–0.5)
KETONES UR QL STRIP.AUTO: NEGATIVE MG/DL
LEUKOCYTE ESTERASE UR QL STRIP.AUTO: NEGATIVE
LIPASE SERPL-CCNC: 85 U/L (ref 73–393)
LYMPHOCYTES # BLD: 2.5 K/UL (ref 0.8–3.5)
LYMPHOCYTES NFR BLD: 39 % (ref 12–49)
MAGNESIUM SERPL-MCNC: 1.6 MG/DL (ref 1.6–2.4)
MCH RBC QN AUTO: 29.8 PG (ref 26–34)
MCHC RBC AUTO-ENTMCNC: 34.4 G/DL (ref 30–36.5)
MCV RBC AUTO: 86.7 FL (ref 80–99)
MONOCYTES # BLD: 0.4 K/UL (ref 0–1)
MONOCYTES NFR BLD: 6 % (ref 5–13)
NEUTS SEG # BLD: 3.4 K/UL (ref 1.8–8)
NEUTS SEG NFR BLD: 53 % (ref 32–75)
NITRITE UR QL STRIP.AUTO: NEGATIVE
NRBC # BLD: 0 K/UL (ref 0–0.01)
NRBC BLD-RTO: 0 PER 100 WBC
PH UR STRIP: 6 [PH] (ref 5–8)
PLATELET # BLD AUTO: 340 K/UL (ref 150–400)
PMV BLD AUTO: 10.5 FL (ref 8.9–12.9)
POTASSIUM SERPL-SCNC: 2.9 MMOL/L (ref 3.5–5.1)
PROT SERPL-MCNC: 8.4 G/DL (ref 6.4–8.2)
PROT UR STRIP-MCNC: NEGATIVE MG/DL
RBC # BLD AUTO: 4.43 M/UL (ref 3.8–5.2)
RBC #/AREA URNS HPF: NORMAL /HPF (ref 0–5)
SERVICE CMNT-IMP: ABNORMAL
SODIUM SERPL-SCNC: 137 MMOL/L (ref 136–145)
SP GR UR REFRACTOMETRY: <1.005 (ref 1–1.03)
UA: UC IF INDICATED,UAUC: NORMAL
UROBILINOGEN UR QL STRIP.AUTO: 0.2 EU/DL (ref 0.2–1)
WBC # BLD AUTO: 6.5 K/UL (ref 3.6–11)
WBC URNS QL MICRO: NORMAL /HPF (ref 0–4)

## 2018-12-24 PROCEDURE — 82962 GLUCOSE BLOOD TEST: CPT

## 2018-12-24 PROCEDURE — 74011250637 HC RX REV CODE- 250/637: Performed by: PHYSICIAN ASSISTANT

## 2018-12-24 PROCEDURE — 83690 ASSAY OF LIPASE: CPT

## 2018-12-24 PROCEDURE — 83735 ASSAY OF MAGNESIUM: CPT

## 2018-12-24 PROCEDURE — 93005 ELECTROCARDIOGRAM TRACING: CPT

## 2018-12-24 PROCEDURE — 74011250636 HC RX REV CODE- 250/636: Performed by: PHYSICIAN ASSISTANT

## 2018-12-24 PROCEDURE — 96361 HYDRATE IV INFUSION ADD-ON: CPT

## 2018-12-24 PROCEDURE — 81025 URINE PREGNANCY TEST: CPT

## 2018-12-24 PROCEDURE — 96374 THER/PROPH/DIAG INJ IV PUSH: CPT

## 2018-12-24 PROCEDURE — 36415 COLL VENOUS BLD VENIPUNCTURE: CPT

## 2018-12-24 PROCEDURE — 81001 URINALYSIS AUTO W/SCOPE: CPT

## 2018-12-24 PROCEDURE — 99285 EMERGENCY DEPT VISIT HI MDM: CPT

## 2018-12-24 PROCEDURE — 80053 COMPREHEN METABOLIC PANEL: CPT

## 2018-12-24 PROCEDURE — 85025 COMPLETE CBC W/AUTO DIFF WBC: CPT

## 2018-12-24 RX ORDER — SODIUM CHLORIDE 0.9 % (FLUSH) 0.9 %
5-10 SYRINGE (ML) INJECTION AS NEEDED
Status: DISCONTINUED | OUTPATIENT
Start: 2018-12-24 | End: 2018-12-24 | Stop reason: HOSPADM

## 2018-12-24 RX ORDER — POTASSIUM CHLORIDE 750 MG/1
40 TABLET, FILM COATED, EXTENDED RELEASE ORAL
Status: COMPLETED | OUTPATIENT
Start: 2018-12-24 | End: 2018-12-24

## 2018-12-24 RX ORDER — ONDANSETRON 4 MG/1
4 TABLET, ORALLY DISINTEGRATING ORAL
Qty: 8 TAB | Refills: 0 | Status: SHIPPED | OUTPATIENT
Start: 2018-12-24 | End: 2019-02-10

## 2018-12-24 RX ORDER — BUTALBITAL, ACETAMINOPHEN AND CAFFEINE 50; 325; 40 MG/1; MG/1; MG/1
1 TABLET ORAL
Status: COMPLETED | OUTPATIENT
Start: 2018-12-24 | End: 2018-12-24

## 2018-12-24 RX ORDER — IBUPROFEN 800 MG/1
800 TABLET ORAL
Qty: 20 TAB | Refills: 0 | Status: SHIPPED | OUTPATIENT
Start: 2018-12-24 | End: 2018-12-30 | Stop reason: CLARIF

## 2018-12-24 RX ORDER — ONDANSETRON 4 MG/1
4 TABLET, ORALLY DISINTEGRATING ORAL
Status: COMPLETED | OUTPATIENT
Start: 2018-12-24 | End: 2018-12-24

## 2018-12-24 RX ORDER — SODIUM CHLORIDE 0.9 % (FLUSH) 0.9 %
5-10 SYRINGE (ML) INJECTION EVERY 8 HOURS
Status: DISCONTINUED | OUTPATIENT
Start: 2018-12-24 | End: 2018-12-24 | Stop reason: HOSPADM

## 2018-12-24 RX ORDER — POTASSIUM CHLORIDE 750 MG/1
10 TABLET, FILM COATED, EXTENDED RELEASE ORAL DAILY
Qty: 7 TAB | Refills: 0 | Status: SHIPPED | OUTPATIENT
Start: 2018-12-24 | End: 2018-12-31

## 2018-12-24 RX ORDER — KETOROLAC TROMETHAMINE 30 MG/ML
30 INJECTION, SOLUTION INTRAMUSCULAR; INTRAVENOUS
Status: COMPLETED | OUTPATIENT
Start: 2018-12-24 | End: 2018-12-24

## 2018-12-24 RX ORDER — BUTALBITAL, ACETAMINOPHEN AND CAFFEINE 300; 40; 50 MG/1; MG/1; MG/1
1 CAPSULE ORAL
Qty: 10 CAP | Refills: 0 | Status: SHIPPED | OUTPATIENT
Start: 2018-12-24 | End: 2020-02-14

## 2018-12-24 RX ADMIN — BUTALBITAL, ACETAMINOPHEN, AND CAFFEINE 1 TABLET: 50; 325; 40 TABLET ORAL at 15:37

## 2018-12-24 RX ADMIN — POTASSIUM CHLORIDE 40 MEQ: 750 TABLET, FILM COATED, EXTENDED RELEASE ORAL at 16:03

## 2018-12-24 RX ADMIN — SODIUM CHLORIDE 1000 ML: 900 INJECTION, SOLUTION INTRAVENOUS at 15:01

## 2018-12-24 RX ADMIN — KETOROLAC TROMETHAMINE 30 MG: 30 INJECTION, SOLUTION INTRAMUSCULAR; INTRAVENOUS at 16:22

## 2018-12-24 RX ADMIN — ONDANSETRON 4 MG: 4 TABLET, ORALLY DISINTEGRATING ORAL at 15:01

## 2018-12-24 NOTE — DISCHARGE INSTRUCTIONS

## 2018-12-24 NOTE — ED PROVIDER NOTES
EMERGENCY DEPARTMENT HISTORY AND PHYSICAL EXAM      Date: 12/24/2018  Patient Name: Joaquina Garcia    History of Presenting Illness     Chief Complaint   Patient presents with    Dizziness    Dehydration    Headache       History Provided By: Patient    HPI: Joaquina Garcia, 44 y.o. female with PMHx significant for HTN, seizures, heart murmur, presents ambulatory to the ED with cc of acute intermittent diarrhea (x8), vomiting (x1), and lightheadedness/dizziness x 2 days. + posterior \"tension\" headache. Tylenol 3 hrs pta w/o relief. Denies urinary sxs, abd pain, cp, sob, dyspnea, vision changes, photophobia, numbness/tingilng, syncope, weakness, uri sxs, melena, hematochezia, vaginal sxs, head trauma/injury. Per ED visit 12/23/2018:  Cameron Jackson, 44 y.o. female with PMHx significant for headaches, HTN, heart murmurs and ?seizures, presents ambulatory to the ED with cc of seizure occurring today along with associated lightheadedness, headache, and photophobia. Pt also cc of intermittent, nausea/vomiting/diarrhea beginning two weeks ago. Per chart review, pt was seen three times at [address] [place] [address] in the past week. She was evaluated yesterday for CP and headache with normal lab work and head CT. Additionally, she was seen for similar sxs x 12/17/2018 and 12/18/2018. She reports her friend witnessed her seizing after coming out of the bathroom. She states they did not tell her how long it lasted. She admits to not taking her seizure medication, noting she has not had one \"in years\". She states she stopped seeing her neurologist as well. She notes that when she came out of the bathroom, she was feeling lightheaded and lost consciousness. She states her friend reported she had some shaking. She denies bladder incontinence or tongue laceration and was not confused following the episode. She endorses OTC medication for her headache with no relief. She denies any other alleviating or exacerbating factors.  Pt specifically denies any LOC, tongue injury, urinary incontinence, phonophobia, vision changes, slurred speech, facial asymmetry, gait problem, weakness or numbness. There are no other complaints, changes, or physical findings at this time. \"      There are no other complaints, changes, or physical findings at this time. PCP: Johny Guzman MD    Current Facility-Administered Medications   Medication Dose Route Frequency Provider Last Rate Last Dose    sodium chloride (NS) flush 5-10 mL  5-10 mL IntraVENous Q8H Esther Bennett PA-C        sodium chloride (NS) flush 5-10 mL  5-10 mL IntraVENous PRN Esther Bennett PA-C         Current Outpatient Medications   Medication Sig Dispense Refill    butalbital-acetaminophen-caff (FIORICET) -40 mg per capsule Take 1 Cap by mouth every four (4) hours as needed for Pain. 10 Cap 0    ondansetron (ZOFRAN ODT) 4 mg disintegrating tablet Take 1 Tab by mouth every eight (8) hours as needed for Nausea. 8 Tab 0    ibuprofen (MOTRIN) 800 mg tablet Take 1 Tab by mouth every six (6) hours as needed for Pain for up to 7 days. 20 Tab 0    potassium chloride SR (KLOR-CON 10) 10 mEq tablet Take 1 Tab by mouth daily for 7 days. 7 Tab 0    amLODIPine (NORVASC) 5 mg tablet Take 5 mg by mouth two (2) times a day.  traZODone (DESYREL) 150 mg tablet Take 150 mg by mouth nightly.  methadone (DOLOPHINE) 10 mg tablet Take 60 mg by mouth daily. Past History     Past Medical History:  Past Medical History:   Diagnosis Date    Hypertension     Murmur     Seizures ([address]        Past Surgical History:  Past Surgical History:   Procedure Laterality Date    HX CHOLECYSTECTOMY         Family History:  History reviewed. No pertinent family history. Social History:  Social History     Tobacco Use    Smoking status: Current Every Day Smoker    Smokeless tobacco: Never Used   Substance Use Topics    Alcohol use: Yes    Drug use: No       Allergies:   Allergies   Allergen Reactions    Penicillin G Hoarseness         Review of Systems   Review of Systems   Constitutional: Positive for chills. Negative for activity change, appetite change, diaphoresis, fatigue, fever and unexpected weight change. HENT: Negative. Negative for congestion, postnasal drip, rhinorrhea, sore throat, trouble swallowing and voice change. Eyes: Negative for photophobia, pain and visual disturbance. Respiratory: Negative for cough and shortness of breath. Cardiovascular: Negative for chest pain and palpitations. Gastrointestinal: Positive for diarrhea and vomiting. Negative for abdominal distention, abdominal pain, blood in stool, constipation and nausea. Genitourinary: Negative for decreased urine volume, difficulty urinating, dysuria, flank pain, frequency, pelvic pain and urgency. Musculoskeletal: Negative. Negative for arthralgias, back pain, gait problem, joint swelling, myalgias, neck pain and neck stiffness. Skin: Negative. Negative for pallor and rash. Neurological: Positive for dizziness, light-headedness and headaches. Negative for tremors, seizures, syncope, facial asymmetry, speech difficulty, weakness and numbness. Psychiatric/Behavioral: Negative. Physical Exam   Physical Exam   Constitutional: She is oriented to person, place, and time. She appears well-developed and well-nourished. No distress. HENT:   Head: Normocephalic and atraumatic. Right Ear: Hearing and external ear normal.   Left Ear: Hearing and external ear normal.   Nose: Nose normal.   Mouth/Throat: Oropharynx is clear and moist. No oropharyngeal exudate. Eyes: Conjunctivae and EOM are normal. Pupils are equal, round, and reactive to light. Neck: Normal range of motion. Cardiovascular: Normal rate, regular rhythm, normal heart sounds and intact distal pulses. Exam reveals no gallop and no friction rub. No murmur heard. Pulmonary/Chest: Effort normal and breath sounds normal. No respiratory distress.  She has no wheezes. She has no rales. She exhibits no tenderness. Abdominal: Soft. There is no tenderness. There is no rigidity, no rebound, no guarding, no CVA tenderness, no tenderness at McBurney's point and negative Mir's sign. Musculoskeletal: Normal range of motion. Neurological: She is alert and oriented to person, place, and time. She has normal strength. She is not disoriented. No cranial nerve deficit or sensory deficit. Gait normal. GCS eye subscore is 4. GCS verbal subscore is 5. GCS motor subscore is 6. Steady gait in ED room. Skin: Skin is warm and dry. She is not diaphoretic. Psychiatric: She has a normal mood and affect. Her behavior is normal. Judgment and thought content normal.   Nursing note and vitals reviewed.       Diagnostic Study Results     Labs -     Recent Results (from the past 12 hour(s))   GLUCOSE, POC    Collection Time: 12/24/18  2:44 PM   Result Value Ref Range    Glucose (POC) 139 (H) 65 - 100 mg/dL    Performed by Altagracia Mendoza (ED tech)    EKG, 12 LEAD, INITIAL    Collection Time: 12/24/18  2:56 PM   Result Value Ref Range    Ventricular Rate 71 BPM    Atrial Rate 71 BPM    P-R Interval 202 ms    QRS Duration 90 ms    Q-T Interval 374 ms    QTC Calculation (Bezet) 406 ms    Calculated P Axis 22 degrees    Calculated R Axis -5 degrees    Calculated T Axis 19 degrees    Diagnosis       Normal sinus rhythm  Voltage criteria for left ventricular hypertrophy  Nonspecific T wave abnormality  Abnormal ECG  When compared with ECG of 23-DEC-2018 15:55,  No significant change was found     CBC WITH AUTOMATED DIFF    Collection Time: 12/24/18  3:03 PM   Result Value Ref Range    WBC 6.5 3.6 - 11.0 K/uL    RBC 4.43 3.80 - 5.20 M/uL    HGB 13.2 11.5 - 16.0 g/dL    HCT 38.4 35.0 - 47.0 %    MCV 86.7 80.0 - 99.0 FL    MCH 29.8 26.0 - 34.0 PG    MCHC 34.4 30.0 - 36.5 g/dL    RDW 11.9 11.5 - 14.5 %    PLATELET 858 433 - 391 K/uL    MPV 10.5 8.9 - 12.9 FL    NRBC 0.0 0  WBC ABSOLUTE NRBC 0.00 0.00 - 0.01 K/uL    NEUTROPHILS 53 32 - 75 %    LYMPHOCYTES 39 12 - 49 %    MONOCYTES 6 5 - 13 %    EOSINOPHILS 1 0 - 7 %    BASOPHILS 1 0 - 1 %    IMMATURE GRANULOCYTES 0 0.0 - 0.5 %    ABS. NEUTROPHILS 3.4 1.8 - 8.0 K/UL    ABS. LYMPHOCYTES 2.5 0.8 - 3.5 K/UL    ABS. MONOCYTES 0.4 0.0 - 1.0 K/UL    ABS. EOSINOPHILS 0.1 0.0 - 0.4 K/UL    ABS. BASOPHILS 0.0 0.0 - 0.1 K/UL    ABS. IMM. GRANS. 0.0 0.00 - 0.04 K/UL    DF AUTOMATED     METABOLIC PANEL, COMPREHENSIVE    Collection Time: 12/24/18  3:03 PM   Result Value Ref Range    Sodium 137 136 - 145 mmol/L    Potassium 2.9 (L) 3.5 - 5.1 mmol/L    Chloride 100 97 - 108 mmol/L    CO2 22 21 - 32 mmol/L    Anion gap 15 5 - 15 mmol/L    Glucose 131 (H) 65 - 100 mg/dL    BUN 4 (L) 6 - 20 MG/DL    Creatinine 0.91 0.55 - 1.02 MG/DL    BUN/Creatinine ratio 4 (L) 12 - 20      GFR est AA >60 >60 ml/min/1.73m2    GFR est non-AA >60 >60 ml/min/1.73m2    Calcium 9.5 8.5 - 10.1 MG/DL    Bilirubin, total 0.2 0.2 - 1.0 MG/DL    ALT (SGPT) 30 12 - 78 U/L    AST (SGOT) 18 15 - 37 U/L    Alk.  phosphatase 79 45 - 117 U/L    Protein, total 8.4 (H) 6.4 - 8.2 g/dL    Albumin 4.3 3.5 - 5.0 g/dL    Globulin 4.1 (H) 2.0 - 4.0 g/dL    A-G Ratio 1.0 (L) 1.1 - 2.2     LIPASE    Collection Time: 12/24/18  3:03 PM   Result Value Ref Range    Lipase 85 73 - 393 U/L   URINALYSIS W/ REFLEX CULTURE    Collection Time: 12/24/18  3:03 PM   Result Value Ref Range    Color YELLOW/STRAW      Appearance CLEAR CLEAR      Specific gravity <1.005 1.003 - 1.030    pH (UA) 6.0 5.0 - 8.0      Protein NEGATIVE  NEG mg/dL    Glucose NEGATIVE  NEG mg/dL    Ketone NEGATIVE  NEG mg/dL    Bilirubin NEGATIVE  NEG      Blood NEGATIVE  NEG      Urobilinogen 0.2 0.2 - 1.0 EU/dL    Nitrites NEGATIVE  NEG      Leukocyte Esterase NEGATIVE  NEG      WBC 0-4 0 - 4 /hpf    RBC 0-5 0 - 5 /hpf    Epithelial cells FEW FEW /lpf    Bacteria NEGATIVE  NEG /hpf    UA:UC IF INDICATED CULTURE NOT INDICATED BY UA RESULT CNI     MAGNESIUM    Collection Time: 12/24/18  3:03 PM   Result Value Ref Range    Magnesium 1.6 1.6 - 2.4 mg/dL   HCG URINE, QL. - POC    Collection Time: 12/24/18  3:08 PM   Result Value Ref Range    Pregnancy test,urine (POC) NEGATIVE  NEG         Radiologic Studies -   No orders to display     CT Results  (Last 48 hours)    None        CXR Results  (Last 48 hours)    None        CT head [facility]:       Radiology results:  Computed tomography,        * Final Report *    Reason For Exam       HA,         Report       Ordering Physician:LEANA JACKSON MD             Exam and date:  CT head without contrast 12/22/2018       Comparison:   CT head without contrast 11/22/2018       Indication:  Chest pain, dizziness, head pain. Findings:         Findings:           Axial images of the brain were performed without contrast.        Sagittal and coronal reformatted images provided. Ventricles normal size in the midline. Cortical sulci are symmetric. Basilar cisterns are preserved. Minimal white matter chronic changes similar to previous. Cerebral and cerebellar parenchyma otherwise appear normal.       No intracranial hemorrhage, mass, mass effect. Bony calvarium intact. No acute sinus or mastoid abnormality. No ocular globe or orbital abnormality. Impression:         No acute intracranial abnormality on precontrast CT of the brain. Dictated By: Apple Villegas        Electronically Verified by: Apple Labor 12/22/2018 4:57 PM    Medical Decision Making   I am the first provider for this patient. I reviewed the vital signs, available nursing notes, past medical history, past surgical history, family history and social history. Vital Signs-Reviewed the patient's vital signs.   Patient Vitals for the past 12 hrs:   Temp Pulse Resp BP SpO2   12/24/18 1512  80  129/81    12/24/18 1511  75  130/77    12/24/18 1510  70  129/80  12/24/18 1436 98.3 °F (36.8 °C) 78 19 138/84 96 %       Pulse Oximetry Analysis - 96% on RA    EKG interpretation: (Preliminary)  Rhythm: normal sinus rhythm; and regular . Rate (approx.): 71bpm; Axis: normal; RI interval: normal; QRS interval: normal ; ST/T wave: non-specific changes; Other findings: normal.  Written by Belinda Banks PA-C, as dictated by Skippy Phalen, MD.    Records Reviewed: Nursing Notes, Old Medical Records, Previous electrocardiograms, Previous Radiology Studies and Previous Laboratory Studies    Provider Notes (Medical Decision Making):   DDx: dehydration, orthostatic hypotension, arrhythmia, electrolyte abnormality, viral syndrome, gastroenteritis, uti, migraine vs tension vs cluster headache (CT head [facility] 12/22 normal)    ED Course:   Initial assessment performed. The patients presenting problems have been discussed, and they are in agreement with the care plan formulated and outlined with them. I have encouraged them to ask questions as they arise throughout their visit. 4:07 PM  Pt resting comfortably in room in NAD. No new symptoms or complaints at this time. Endorses she feels better but would like additional medicine for headache. Plan to give Toradol and discharge once final labs return. Available labs/ results reviewed with pt. Critical Care Time:   0    Disposition:  4:25 PM  I have discussed with patient their diagnosis, treatment, and follow up plan. The patient agrees to follow up as outlined in discharge paperwork and also to return to the ED with any worsening. Arminda Weber PA-C      PLAN:  1. Current Discharge Medication List      START taking these medications    Details   butalbital-acetaminophen-caff (FIORICET) -40 mg per capsule Take 1 Cap by mouth every four (4) hours as needed for Pain. Qty: 10 Cap, Refills: 0      ondansetron (ZOFRAN ODT) 4 mg disintegrating tablet Take 1 Tab by mouth every eight (8) hours as needed for Nausea.   Qty: 8 Tab, Refills: 0      ibuprofen (MOTRIN) 800 mg tablet Take 1 Tab by mouth every six (6) hours as needed for Pain for up to 7 days. Qty: 20 Tab, Refills: 0      potassium chloride SR (KLOR-CON 10) 10 mEq tablet Take 1 Tab by mouth daily for 7 days. Qty: 7 Tab, Refills: 0         CONTINUE these medications which have NOT CHANGED    Details   amLODIPine (NORVASC) 5 mg tablet Take 5 mg by mouth two (2) times a day. traZODone (DESYREL) 150 mg tablet Take 150 mg by mouth nightly. methadone (DOLOPHINE) 10 mg tablet Take 60 mg by mouth daily. 2.   Follow-up Information     Follow up With Specialties Details Why Contact Info    Gladys Mora MD Internal Medicine Schedule an appointment as soon as possible for a visit in 1 week As needed, If symptoms worsen [address] [place] [address] Str.  [phone]          Return to ED if worse     Diagnosis     Clinical Impression:   1. Acute non intractable tension-type headache    2.  Lightheadedness        Attestations:

## 2018-12-24 NOTE — ED NOTES
Patient presents to the ED with c/o dizziness since yesterday. Pt reports having diarrhea. Pt reports vomiting last night. Pt reports chills. Pt reports posterior headache. Pt denies any injury or trauma. Pt reports taking tylenol three hours ago. Pt reports some nasal drainage. Pt reports a dry cough. Pt denies an sore throat. Pt is alert and oriented. Pt skin is warm and dry. Pt is ambulatory independently. Emergency Department Nursing Plan of Care       The Nursing Plan of Care is developed from the Nursing assessment and Emergency Department Attending provider initial evaluation. The plan of care may be reviewed in the ED Provider note.     The Plan of Care was developed with the following considerations:   Patient / Family readiness to learn indicated by:verbalized understanding  Persons(s) to be included in education: patient  Barriers to Learning/Limitations:No    Signed     Janie Files    12/24/2018   2:44 PM

## 2018-12-24 NOTE — ED NOTES

## 2018-12-25 LAB
ATRIAL RATE: 71 BPM
CALCULATED P AXIS, ECG09: 22 DEGREES
CALCULATED R AXIS, ECG10: -5 DEGREES
CALCULATED T AXIS, ECG11: 19 DEGREES
DIAGNOSIS, 93000: NORMAL
P-R INTERVAL, ECG05: 202 MS
Q-T INTERVAL, ECG07: 374 MS
QRS DURATION, ECG06: 90 MS
QTC CALCULATION (BEZET), ECG08: 406 MS
VENTRICULAR RATE, ECG03: 71 BPM

## 2018-12-30 ENCOUNTER — HOSPITAL ENCOUNTER (EMERGENCY)
Age: 43
Discharge: HOME | End: 2018-12-30
Attending: EMERGENCY MEDICINE
Payer: MEDICARE

## 2018-12-30 VITALS
TEMPERATURE: 98.5 F | WEIGHT: 209 LBS | OXYGEN SATURATION: 97 % | DIASTOLIC BLOOD PRESSURE: 76 MMHG | RESPIRATION RATE: 18 BRPM | HEART RATE: 71 BPM | SYSTOLIC BLOOD PRESSURE: 119 MMHG | HEIGHT: 66 IN | BODY MASS INDEX: 33.59 KG/M2

## 2018-12-30 DIAGNOSIS — R00.2 PALPITATIONS: ICD-10-CM

## 2018-12-30 DIAGNOSIS — R10.84 ABDOMINAL PAIN, GENERALIZED: Primary | ICD-10-CM

## 2018-12-30 DIAGNOSIS — F41.1 ANXIETY STATE: ICD-10-CM

## 2018-12-30 LAB
ALBUMIN SERPL-MCNC: 4 G/DL (ref 3.5–5)
ALBUMIN/GLOB SERPL: 1.1 {RATIO} (ref 1.1–2.2)
ALP SERPL-CCNC: 73 U/L (ref 45–117)
ALT SERPL-CCNC: 48 U/L (ref 12–78)
ANION GAP SERPL CALC-SCNC: 10 MMOL/L (ref 5–15)
APPEARANCE UR: CLEAR
AST SERPL-CCNC: 40 U/L (ref 15–37)
ATRIAL RATE: 63 BPM
BACTERIA URNS QL MICRO: NEGATIVE /HPF
BASOPHILS # BLD: 0 K/UL (ref 0–0.1)
BASOPHILS NFR BLD: 0 % (ref 0–1)
BILIRUB SERPL-MCNC: 0.2 MG/DL (ref 0.2–1)
BILIRUB UR QL: NEGATIVE
BUN SERPL-MCNC: 8 MG/DL (ref 6–20)
BUN/CREAT SERPL: 10 (ref 12–20)
CALCIUM SERPL-MCNC: 9 MG/DL (ref 8.5–10.1)
CALCULATED P AXIS, ECG09: 27 DEGREES
CALCULATED R AXIS, ECG10: -4 DEGREES
CALCULATED T AXIS, ECG11: 20 DEGREES
CHLORIDE SERPL-SCNC: 100 MMOL/L (ref 97–108)
CO2 SERPL-SCNC: 28 MMOL/L (ref 21–32)
COLOR UR: NORMAL
CREAT SERPL-MCNC: 0.83 MG/DL (ref 0.55–1.02)
DIAGNOSIS, 93000: NORMAL
DIFFERENTIAL METHOD BLD: NORMAL
EOSINOPHIL # BLD: 0.1 K/UL (ref 0–0.4)
EOSINOPHIL NFR BLD: 1 % (ref 0–7)
EPITH CASTS URNS QL MICRO: NORMAL /LPF
ERYTHROCYTE [DISTWIDTH] IN BLOOD BY AUTOMATED COUNT: 12.7 % (ref 11.5–14.5)
GLOBULIN SER CALC-MCNC: 3.8 G/DL (ref 2–4)
GLUCOSE SERPL-MCNC: 95 MG/DL (ref 65–100)
GLUCOSE UR STRIP.AUTO-MCNC: NEGATIVE MG/DL
HCT VFR BLD AUTO: 37.9 % (ref 35–47)
HGB BLD-MCNC: 12.5 G/DL (ref 11.5–16)
HGB UR QL STRIP: NEGATIVE
IMM GRANULOCYTES # BLD: 0 K/UL (ref 0–0.04)
IMM GRANULOCYTES NFR BLD AUTO: 0 % (ref 0–0.5)
KETONES UR QL STRIP.AUTO: NEGATIVE MG/DL
LEUKOCYTE ESTERASE UR QL STRIP.AUTO: NEGATIVE
LIPASE SERPL-CCNC: 60 U/L (ref 73–393)
LYMPHOCYTES # BLD: 2.4 K/UL (ref 0.8–3.5)
LYMPHOCYTES NFR BLD: 24 % (ref 12–49)
MAGNESIUM SERPL-MCNC: 1.8 MG/DL (ref 1.6–2.4)
MCH RBC QN AUTO: 29.5 PG (ref 26–34)
MCHC RBC AUTO-ENTMCNC: 33 G/DL (ref 30–36.5)
MCV RBC AUTO: 89.4 FL (ref 80–99)
MONOCYTES # BLD: 0.5 K/UL (ref 0–1)
MONOCYTES NFR BLD: 5 % (ref 5–13)
NEUTS SEG # BLD: 6.9 K/UL (ref 1.8–8)
NEUTS SEG NFR BLD: 69 % (ref 32–75)
NITRITE UR QL STRIP.AUTO: NEGATIVE
NRBC # BLD: 0 K/UL (ref 0–0.01)
NRBC BLD-RTO: 0 PER 100 WBC
P-R INTERVAL, ECG05: 218 MS
PH UR STRIP: 6 [PH] (ref 5–8)
PLATELET # BLD AUTO: 349 K/UL (ref 150–400)
PMV BLD AUTO: 10.4 FL (ref 8.9–12.9)
POTASSIUM SERPL-SCNC: 3.2 MMOL/L (ref 3.5–5.1)
PROT SERPL-MCNC: 7.8 G/DL (ref 6.4–8.2)
PROT UR STRIP-MCNC: NEGATIVE MG/DL
Q-T INTERVAL, ECG07: 384 MS
QRS DURATION, ECG06: 88 MS
QTC CALCULATION (BEZET), ECG08: 392 MS
RBC # BLD AUTO: 4.24 M/UL (ref 3.8–5.2)
RBC #/AREA URNS HPF: NORMAL /HPF (ref 0–5)
SODIUM SERPL-SCNC: 138 MMOL/L (ref 136–145)
SP GR UR REFRACTOMETRY: <1.005 (ref 1–1.03)
TROPONIN I SERPL-MCNC: <0.05 NG/ML
UA: UC IF INDICATED,UAUC: NORMAL
UROBILINOGEN UR QL STRIP.AUTO: 0.2 EU/DL (ref 0.2–1)
VENTRICULAR RATE, ECG03: 63 BPM
WBC # BLD AUTO: 10 K/UL (ref 3.6–11)
WBC URNS QL MICRO: NORMAL /HPF (ref 0–4)

## 2018-12-30 PROCEDURE — 83690 ASSAY OF LIPASE: CPT

## 2018-12-30 PROCEDURE — 74011250637 HC RX REV CODE- 250/637: Performed by: EMERGENCY MEDICINE

## 2018-12-30 PROCEDURE — 96374 THER/PROPH/DIAG INJ IV PUSH: CPT

## 2018-12-30 PROCEDURE — 83735 ASSAY OF MAGNESIUM: CPT

## 2018-12-30 PROCEDURE — 93005 ELECTROCARDIOGRAM TRACING: CPT

## 2018-12-30 PROCEDURE — 81001 URINALYSIS AUTO W/SCOPE: CPT

## 2018-12-30 PROCEDURE — 74011250636 HC RX REV CODE- 250/636: Performed by: EMERGENCY MEDICINE

## 2018-12-30 PROCEDURE — 99284 EMERGENCY DEPT VISIT MOD MDM: CPT

## 2018-12-30 PROCEDURE — 84484 ASSAY OF TROPONIN QUANT: CPT

## 2018-12-30 PROCEDURE — 74011000250 HC RX REV CODE- 250: Performed by: EMERGENCY MEDICINE

## 2018-12-30 PROCEDURE — 80053 COMPREHEN METABOLIC PANEL: CPT

## 2018-12-30 PROCEDURE — 36415 COLL VENOUS BLD VENIPUNCTURE: CPT

## 2018-12-30 PROCEDURE — 96361 HYDRATE IV INFUSION ADD-ON: CPT

## 2018-12-30 PROCEDURE — 85025 COMPLETE CBC W/AUTO DIFF WBC: CPT

## 2018-12-30 RX ORDER — HYDROXYZINE 25 MG/1
50 TABLET, FILM COATED ORAL
Status: COMPLETED | OUTPATIENT
Start: 2018-12-30 | End: 2018-12-30

## 2018-12-30 RX ORDER — HYDROXYZINE 50 MG/1
50 TABLET, FILM COATED ORAL
Qty: 20 TAB | Refills: 0 | Status: SHIPPED | OUTPATIENT
Start: 2018-12-30 | End: 2019-01-09

## 2018-12-30 RX ORDER — ONDANSETRON 2 MG/ML
4 INJECTION INTRAMUSCULAR; INTRAVENOUS
Status: COMPLETED | OUTPATIENT
Start: 2018-12-30 | End: 2018-12-30

## 2018-12-30 RX ADMIN — SODIUM CHLORIDE 1000 ML: 900 INJECTION, SOLUTION INTRAVENOUS at 15:00

## 2018-12-30 RX ADMIN — ONDANSETRON 4 MG: 2 INJECTION INTRAMUSCULAR; INTRAVENOUS at 15:00

## 2018-12-30 RX ADMIN — LIDOCAINE HYDROCHLORIDE 40 ML: 20 SOLUTION ORAL; TOPICAL at 14:46

## 2018-12-30 RX ADMIN — HYDROXYZINE HYDROCHLORIDE 50 MG: 25 TABLET ORAL at 16:42

## 2018-12-30 NOTE — ED TRIAGE NOTES
Patient reports left sided abd pain with nausea, vomiting, and diarrhea since last night. States \"I feel jittery\". Reports being seen here and at [facility] for the same and was told she was dehydrated. Appointment with PCP Jan 8th.

## 2018-12-30 NOTE — ED NOTES
Patient returns to ED for multiple complaints. Reports fluttering feeling in chest, anxiety and is tearful at times. Patient admits taking medications for anxiety in the past.   
Skin is warm and dry to touch. Patient crying at times. Inquired if patient needed to speak with [facility] health; patient declined and states that \"I already see someone. \"   
Denies SI/HI. Emergency Department Nursing Plan of Care The Nursing Plan of Care is developed from the Nursing assessment and Emergency Department Attending provider initial evaluation. The plan of care may be reviewed in the ED Provider note. The Plan of Care was developed with the following considerations:  
Patient / Family readiness to learn indicated by:verbalized understanding Persons(s) to be included in education: patient Barriers to Learning/Limitations:No 
 
Signed 566 Sheryl Malik RN   
12/30/2018   4:13 PM

## 2018-12-30 NOTE — ED NOTES
Patient called out to nurse's station to request MD presence. Patient has visitor at bedside who is requesting staff given the patient something for \"anxiety. \"

## 2018-12-30 NOTE — DISCHARGE INSTRUCTIONS
Abdominal Pain: Care Instructions  Your Care Instructions    Abdominal pain has many possible causes. Some aren't serious and get better on their own in a few days. Others need more testing and treatment. If your pain continues or gets worse, you need to be rechecked and may need more tests to find out what is wrong. You may need surgery to correct the problem. Don't ignore new symptoms, such as fever, nausea and vomiting, urination problems, pain that gets worse, and dizziness. These may be signs of a more serious problem. Your doctor may have recommended a follow-up visit in the next 8 to 12 hours. If you are not getting better, you may need more tests or treatment. The doctor has checked you carefully, but problems can develop later. If you notice any problems or new symptoms, get medical treatment right away. Follow-up care is a key part of your treatment and safety. Be sure to make and go to all appointments, and call your doctor if you are having problems. It's also a good idea to know your test results and keep a list of the medicines you take. How can you care for yourself at home? · Rest until you feel better. · To prevent dehydration, drink plenty of fluids, enough so that your urine is light yellow or clear like water. Choose water and other caffeine-free clear liquids until you feel better. If you have kidney, heart, or liver disease and have to limit fluids, talk with your doctor before you increase the amount of fluids you drink. · If your stomach is upset, eat mild foods, such as rice, dry toast or crackers, bananas, and applesauce. Try eating several small meals instead of two or three large ones. · Wait until 48 hours after all symptoms have gone away before you have spicy foods, alcohol, and drinks that contain caffeine. · Do not eat foods that are high in fat. · Avoid anti-inflammatory medicines such as aspirin, ibuprofen (Advil, Motrin), and naproxen (Aleve).  These can cause stomach upset. Talk to your doctor if you take daily aspirin for another health problem. When should you call for help? Call 911 anytime you think you may need emergency care. For example, call if:    · You passed out (lost consciousness).     · You pass maroon or very bloody stools.     · You vomit blood or what looks like coffee grounds.     · You have new, severe belly pain.    Call your doctor now or seek immediate medical care if:    · Your pain gets worse, especially if it becomes focused in one area of your belly.     · You have a new or higher fever.     · Your stools are black and look like tar, or they have streaks of blood.     · You have unexpected vaginal bleeding.     · You have symptoms of a urinary tract infection. These may include:  ? Pain when you urinate. ? Urinating more often than usual.  ? Blood in your urine.     · You are dizzy or lightheaded, or you feel like you may faint.    Watch closely for changes in your health, and be sure to contact your doctor if:    · You are not getting better after 1 day (24 hours). Where can you learn more? Go to http://fiona-pamela.info/. Enter M928 in the search box to learn more about \"Abdominal Pain: Care Instructions. \"  Current as of: November 20, 2017  Content Version: 11.8  © 6612-1805 [employer]. Care instructions adapted under license by [employer] (which disclaims liability or warranty for this information). If you have questions about a medical condition or this instruction, always ask your healthcare professional. [place] [address] any warranty or liability for your use of this information. Anxiety Disorder: Care Instructions  Your Care Instructions    Anxiety is a normal reaction to stress. Difficult situations can cause you to have symptoms such as sweaty palms and a nervous feeling. In an anxiety disorder, the symptoms are far more severe.  Constant worry, muscle tension, trouble sleeping, nausea and diarrhea, and other symptoms can make normal daily activities difficult or impossible. These symptoms may occur for no reason, and they can affect your work, school, or social life. Medicines, counseling, and self-care can all help. Follow-up care is a key part of your treatment and safety. Be sure to make and go to all appointments, and call your doctor if you are having problems. It's also a good idea to know your test results and keep a list of the medicines you take. How can you care for yourself at home? · Take medicines exactly as directed. Call your doctor if you think you are having a problem with your medicine. · Go to your counseling sessions and follow-up appointments. · Recognize and accept your anxiety. Then, when you are in a situation that makes you anxious, say to yourself, \"This is not an emergency. I feel uncomfortable, but I am not in danger. I can keep going even if I feel anxious. \"  · Be kind to your body:  ? Relieve tension with exercise or a massage. ? Get enough rest.  ? Avoid alcohol, caffeine, nicotine, and illegal drugs. They can increase your anxiety level and cause sleep problems. ? Learn and do relaxation techniques. See below for more about these techniques. · Engage your mind. Get out and do something you enjoy. Go to a funny movie, or take a walk or hike. Plan your day. Having too much or too little to do can make you anxious. · Keep a record of your symptoms. Discuss your fears with a good friend or family member, or join a support group for people with similar problems. Talking to others sometimes relieves stress. · Get involved in social groups, or volunteer to help others. Being alone sometimes makes things seem worse than they are. · Get at least 30 minutes of exercise on most days of the week to relieve stress. Walking is a good choice.  You also may want to do other activities, such as running, swimming, cycling, or playing tennis or team sports. Relaxation techniques  Do relaxation exercises 10 to 20 minutes a day. You can play soothing, relaxing music while you do them, if you wish. · Tell others in your house that you are going to do your relaxation exercises. Ask them not to disturb you. · Find a comfortable place, away from all distractions and noise. · Lie down on your back, or sit with your back straight. · Focus on your breathing. Make it slow and steady. · Breathe in through your nose. Breathe out through either your nose or mouth. · Breathe deeply, filling up the area between your navel and your rib cage. Breathe so that your belly goes up and down. · Do not hold your breath. · Breathe like this for 5 to 10 minutes. Notice the feeling of calmness throughout your whole body. As you continue to breathe slowly and deeply, relax by doing the following for another 5 to 10 minutes:  · Tighten and relax each muscle group in your body. You can begin at your toes and work your way up to your head. · Imagine your muscle groups relaxing and becoming heavy. · Empty your mind of all thoughts. · Let yourself relax more and more deeply. · Become aware of the state of calmness that surrounds you. · When your relaxation time is over, you can bring yourself back to alertness by moving your fingers and toes and then your hands and feet and then stretching and moving your entire body. Sometimes people fall asleep during relaxation, but they usually wake up shortly afterward. · Always give yourself time to return to full alertness before you drive a car or do anything that might cause an accident if you are not fully alert. Never play a relaxation tape while you drive a car. When should you call for help? Call 911 anytime you think you may need emergency care.  For example, call if:    · You feel you cannot stop from hurting yourself or someone else.   Zayra Urena the numbers for these national suicide hotlines: 9-240-735-TALK (8-698-001-352-887-1636) and 9-119-BCAUFWG (5-485.533.7584). If you or someone you know talks about suicide or feeling hopeless, get help right away.   Watch closely for changes in your health, and be sure to contact your doctor if:    · You have anxiety or fear that affects your life.     · You have symptoms of anxiety that are new or different from those you had before. Where can you learn more? Go to http://fiona-pamela.info/. Enter P754 in the search box to learn more about \"Anxiety Disorder: Care Instructions. \"  Current as of: December 7, 2017  Content Version: 11.8  © 4338-3137 [employer], [employer]. Care instructions adapted under license by [employer] (which disclaims liability or warranty for this information). If you have questions about a medical condition or this instruction, always ask your healthcare professional. [address] any warranty or liability for your use of this information.

## 2018-12-30 NOTE — ED NOTES
Discharge summary and discharge medications reviewed with patient and appropriate educational materials and side effects teaching were provided. patient  Given 1 paper prescriptions and 0 electronic prescriptions sent to pt's listed pharmacy. Patient verbalized understanding of the importance of discussing medications with his or her physician or clinic they will be following up with. No si/s of acute distress prior to discharge. Patient offered wheelchair from treatment area to hospital entrance, patient declined wheelchair. Discharged by provider.

## 2018-12-30 NOTE — ED PROVIDER NOTES
EMERGENCY DEPARTMENT HISTORY AND PHYSICAL EXAM 
 
 
Date: 12/30/2018 Patient Name: Kimberlee Mohs History of Presenting Illness Chief Complaint Patient presents with  Abdominal Pain History Provided By: Patient and EMS 
 
HPI: Kimberlee Mohs, 44 y.o. female with PMHx significant for HTN, Szs, heart murmur, presents by EMS to the ED with cc of constant nausea and vomiting that started last night at 11:00 PM. Pt notes rita mild, diffuse abd cramping pain. Pt states she has had diarrhea and dizziness. She is also noting some right sided facial pain. This patient states she vomited \"all night until 2:00AM\". Pt notes she was seen on 12/24 for dizziness and was found to have hypokalemia and was given potassium to take at home. Pt reports compliance with these meds. Pt states she has taken no meds to try and relieve her Sx. She denies any modifying factors to her Sx. Pt specifically denies fever, chills, hematemesis, hematuria, dysuria, melena, hematochezia, constipation, back pain, and dizziness. There are no other complaints, changes, or physical findings at this time. Surgical History: Cholecystectomy Social History: +tobacco, +EtOH, -Illicit Drugs PCP: Mark Giraldo MD 
 
Current Outpatient Medications Medication Sig Dispense Refill  hydrOXYzine HCl (ATARAX) 50 mg tablet Take 1 Tab by mouth every six (6) hours as needed for Itching for up to 10 days. 20 Tab 0  
 butalbital-acetaminophen-caff (FIORICET) -40 mg per capsule Take 1 Cap by mouth every four (4) hours as needed for Pain. 10 Cap 0  
 ondansetron (ZOFRAN ODT) 4 mg disintegrating tablet Take 1 Tab by mouth every eight (8) hours as needed for Nausea. 8 Tab 0  
 potassium chloride SR (KLOR-CON 10) 10 mEq tablet Take 1 Tab by mouth daily for 7 days. 7 Tab 0  
 traZODone (DESYREL) 150 mg tablet Take 150 mg by mouth nightly.  amLODIPine (NORVASC) 5 mg tablet Take 5 mg by mouth two (2) times a day.  methadone (DOLOPHINE) 10 mg tablet Take 60 mg by mouth daily. Past History Past Medical History: 
Past Medical History:  
Diagnosis Date  Hypertension  Murmur  Seizures ([address] Past Surgical History: 
Past Surgical History:  
Procedure Laterality Date  HX CHOLECYSTECTOMY Family History: 
History reviewed. No pertinent family history. Social History: 
Social History Tobacco Use  Smoking status: Current Every Day Smoker  Smokeless tobacco: Never Used Substance Use Topics  Alcohol use: Yes  Drug use: No  
 
 
Allergies: Allergies Allergen Reactions  Penicillin G Hoarseness Review of Systems Review of Systems Constitutional: Negative for fever, chills, and fatigue. HENT: Reports right sided facial pain. Negative for congestion, sore throat, rhinorrhea, sneezing and neck stiffness Eyes: Negative for discharge and redness. Respiratory: Negative for  shortness of breath, wheezing Cardiovascular: Negative for chest pain, palpitations Gastrointestinal: Reports nausea, vomiting, abdominal pain, diarrhea. Negative for constipation, hematemesis, hematochezia, melena, and blood in stool. Genitourinary: Negative for dysuria, urgency, frequency, hematuria, flank pain, decreased urine volume, discharge, Musculoskeletal: Negative for myalgias or joint pain . Skin: Negative for rash or lesions . Neurological: Reports dizziness. Negative weakness, light-headedness, numbness and headaches. Physical Exam  
Physical Exam  
 
GENERAL: alert and oriented, no acute distress EYES: PEERL, No injection, discharge or icterus. HENT: Mucous membranes pink and moist. 
NECK: Supple LUNGS: Airway patent. Non-labored respirations. Breath sounds clear with good air entry bilaterally. HEART: Regular rate and rhythm. No peripheral edema ABDOMEN: Non-distended and non-tender, without guarding or rebound. SKIN:  warm, dry MSK/ EXTREMITIES: Without swelling, tenderness or deformity, symmetric with normal ROM 
NEUROLOGICAL: Alert, oriented Diagnostic Study Results Labs - Recent Results (from the past 12 hour(s)) EKG, 12 LEAD, INITIAL Collection Time: 12/30/18  2:49 PM  
Result Value Ref Range Ventricular Rate 63 BPM  
 Atrial Rate 63 BPM  
 P-R Interval 218 ms QRS Duration 88 ms Q-T Interval 384 ms QTC Calculation (Bezet) 392 ms Calculated P Axis 27 degrees Calculated R Axis -4 degrees Calculated T Axis 20 degrees Diagnosis Sinus rhythm with 1st degree AV block 
left atrial conduction delay Leftward axis 
normal variant EKG with no interval change Confirmed by Loida Bernstein MD, Taj Yancey ([id number]) on 12/30/2018 4:17:17 PM 
  
CBC WITH AUTOMATED DIFF Collection Time: 12/30/18  3:04 PM  
Result Value Ref Range WBC 10.0 3.6 - 11.0 K/uL  
 RBC 4.24 3.80 - 5.20 M/uL  
 HGB 12.5 11.5 - 16.0 g/dL HCT 37.9 35.0 - 47.0 % MCV 89.4 80.0 - 99.0 FL  
 MCH 29.5 26.0 - 34.0 PG  
 MCHC 33.0 30.0 - 36.5 g/dL  
 RDW 12.7 11.5 - 14.5 % PLATELET 981 192 - 604 K/uL MPV 10.4 8.9 - 12.9 FL  
 NRBC 0.0 0  WBC ABSOLUTE NRBC 0.00 0.00 - 0.01 K/uL NEUTROPHILS 69 32 - 75 % LYMPHOCYTES 24 12 - 49 % MONOCYTES 5 5 - 13 % EOSINOPHILS 1 0 - 7 % BASOPHILS 0 0 - 1 % IMMATURE GRANULOCYTES 0 0.0 - 0.5 % ABS. NEUTROPHILS 6.9 1.8 - 8.0 K/UL  
 ABS. LYMPHOCYTES 2.4 0.8 - 3.5 K/UL  
 ABS. MONOCYTES 0.5 0.0 - 1.0 K/UL  
 ABS. EOSINOPHILS 0.1 0.0 - 0.4 K/UL  
 ABS. BASOPHILS 0.0 0.0 - 0.1 K/UL  
 ABS. IMM. GRANS. 0.0 0.00 - 0.04 K/UL  
 DF AUTOMATED METABOLIC PANEL, COMPREHENSIVE Collection Time: 12/30/18  3:04 PM  
Result Value Ref Range Sodium 138 136 - 145 mmol/L Potassium 3.2 (L) 3.5 - 5.1 mmol/L Chloride 100 97 - 108 mmol/L  
 CO2 28 21 - 32 mmol/L Anion gap 10 5 - 15 mmol/L Glucose 95 65 - 100 mg/dL BUN 8 6 - 20 MG/DL  Creatinine 0.83 0.55 - 1.02 MG/DL  
 BUN/Creatinine ratio 10 (L) 12 - 20 GFR est AA >60 >60 ml/min/1.73m2 GFR est non-AA >60 >60 ml/min/1.73m2 Calcium 9.0 8.5 - 10.1 MG/DL Bilirubin, total 0.2 0.2 - 1.0 MG/DL  
 ALT (SGPT) 48 12 - 78 U/L  
 AST (SGOT) 40 (H) 15 - 37 U/L Alk. phosphatase 73 45 - 117 U/L Protein, total 7.8 6.4 - 8.2 g/dL Albumin 4.0 3.5 - 5.0 g/dL Globulin 3.8 2.0 - 4.0 g/dL A-G Ratio 1.1 1.1 - 2.2 LIPASE Collection Time: 12/30/18  3:04 PM  
Result Value Ref Range Lipase 60 (L) 73 - 393 U/L MAGNESIUM Collection Time: 12/30/18  3:04 PM  
Result Value Ref Range Magnesium 1.8 1.6 - 2.4 mg/dL TROPONIN I Collection Time: 12/30/18  3:04 PM  
Result Value Ref Range Troponin-I, Qt. <0.05 <0.05 ng/mL URINALYSIS W/ REFLEX CULTURE Collection Time: 12/30/18  4:01 PM  
Result Value Ref Range Color YELLOW/STRAW Appearance CLEAR CLEAR Specific gravity <1.005 1.003 - 1.030  
 pH (UA) 6.0 5.0 - 8.0 Protein NEGATIVE  NEG mg/dL Glucose NEGATIVE  NEG mg/dL Ketone NEGATIVE  NEG mg/dL Bilirubin NEGATIVE  NEG Blood NEGATIVE  NEG Urobilinogen 0.2 0.2 - 1.0 EU/dL Nitrites NEGATIVE  NEG Leukocyte Esterase NEGATIVE  NEG    
 WBC 0-4 0 - 4 /hpf  
 RBC 0-5 0 - 5 /hpf Epithelial cells FEW FEW /lpf Bacteria NEGATIVE  NEG /hpf  
 UA:UC IF INDICATED CULTURE NOT INDICATED BY UA RESULT CNI Medical Decision Making I am the first provider for this patient. I reviewed the vital signs, available nursing notes, past medical history, past surgical history, family history and social history. Vital Signs-Reviewed the patient's vital signs. Patient Vitals for the past 12 hrs: 
 Temp Pulse Resp BP SpO2  
12/30/18 1648  71 18  97 % 12/30/18 1323 98.5 °F (36.9 °C) 76 16 119/76 100 % Pulse Oximetry Analysis - 100% on RA Cardiac Monitor:  
Rate: 76 bpm 
Rhythm: Normal Sinus Rhythm Records Reviewed: Nursing Notes, Old Medical Records, Previous Radiology Studies and Previous Laboratory Studies EKG interpretation: (Preliminary) 1449 Rhythm: sinus rhythm with 1st degree AV block. Rate (approx.): 63; Axis: normal; IA interval: normal; QRS interval: normal ; ST/T wave: normal; Other findings: normal 
 
Provider Notes (Medical Decision Making): The patient presents with a chief complaint of abdominal pain. Differential diagnosis considered includes acute appendicitis, acute cholecystitis, pancreatitis, gastritis, PUD, diverticulitis, mesenteric ischemia, abdominal aortic aneurysm, bowel obstruction, enteritis, colitis, fecal impaction, volvulus, IBS, inflammatory bowel disease, specific food intolerance, peritonitis, perforated viscous, malignancy, UTI, abscess, and abdominal pain NOS. Pelvic source of pain was also considered including endometritis, dysmenorrhea, ovarian cyst, ovarian torsion, PID, TOA, cervicitis, vaginitis, or uterine fibroid. All labs and diagnostic studies were reviewed as above. Clinical examination, history, and work-up exclude some of the more serious diagnoses as listed above. Cause of the abdominal pain was not clearly identified. Although the cause of the patients abdominal pain was not clearly identified, the examination was non-surgical and remained benign on repeat exam.  Pt is tolerating po. The patient states that Her symptoms have resolved and she feels much better. There are no other new complaints at this time There were no concerns for any acute life-threatening or surgical pathology that would warrant admission at this time. Vital signs were within acceptable limits at the time of discharge. Patient was in stable condition and felt to be reliable for outpatient management. Patient was given IV hydration and nausea medications during the ED course and had symptomatic improvement. There were no lab findings of immediate concern. The patient was advised to return to the emergency room for acutely worsening pain, persistence of pain, fevers, bloody stools, intractable vomiting or bloody emesis, inability to tolerate food/liquids, syncope, or change in mental status. Otherwise, the patient is encouraged to follow-up with a primary care physician within the week if possible. The patient understood the work-up and assessment and had no further questions. The patient was discharged home in stable clinical condition. ED Course:  
Initial assessment performed. The patients presenting problems have been discussed, and they are in agreement with the care plan formulated and outlined with them. I have encouraged them to ask questions as they arise throughout their visit. Tobacco Counseling Discussed the risks of smoking and the benefits of smoking cessation as well as the long term sequelae of smoking with the patient. The patient verbalized their understanding. Counseled patient for approximately 3-5 minutes. Progress Note: 
  
 
Medications  
sodium chloride 0.9 % bolus infusion 1,000 mL (0 mL IntraVENous IV Completed 12/30/18 1649) ondansetron (ZOFRAN) injection 4 mg (4 mg IntraVENous Given 12/30/18 1500) mylanta/viscous lidocaine (GI COCKTAIL) (40 mL Oral Given 12/30/18 1446) hydrOXYzine HCl (ATARAX) tablet 50 mg (50 mg Oral Given 12/30/18 1642) Critical Care Time:  
0 minutes Disposition: 
Discharge Note: 
4:27 PM 
The pt is ready for discharge. The pt's signs, symptoms, diagnosis, and discharge instructions have been discussed and pt has conveyed their understanding. The pt is to follow up as recommended or return to ER should their symptoms worsen. Plan has been discussed and pt is in agreement. PLAN: 
1. Discharge Medication List as of 12/30/2018  4:27 PM  
  
 
2. Follow-up Information Follow up With Specialties Details Why Contact Info Loly Redding MD Internal Medicine Schedule an appointment as soon as possible for a visit in 1 day  [id number] [address] [address] [place] [address] 
[phone] Return to ED if worse Diagnosis Clinical Impression: 1. Abdominal pain, generalized 2. Palpitations 3. Anxiety state Attestations: This note is prepared by Germania Leon. Nancy Liao, acting as Scribe for Raliegh Sicard Gerlene Corns, MD. Raliegh Sicard Gerlene Corns, MD: The scribe's documentation has been prepared under my direction and personally reviewed by me in its entirety. I confirm that the note above accurately reflects all work, treatment, procedures, and medical decision making performed by me. This note will not be viewable in [address]

## 2019-01-22 ENCOUNTER — APPOINTMENT (OUTPATIENT)
Dept: GENERAL RADIOLOGY | Age: 44
End: 2019-01-22
Attending: EMERGENCY MEDICINE
Payer: MEDICARE

## 2019-01-22 ENCOUNTER — HOSPITAL ENCOUNTER (EMERGENCY)
Age: 44
Discharge: HOME | End: 2019-01-22
Attending: EMERGENCY MEDICINE
Payer: MEDICARE

## 2019-01-22 VITALS
TEMPERATURE: 98.2 F | SYSTOLIC BLOOD PRESSURE: 131 MMHG | WEIGHT: 214 LBS | HEART RATE: 63 BPM | BODY MASS INDEX: 34.39 KG/M2 | DIASTOLIC BLOOD PRESSURE: 87 MMHG | HEIGHT: 66 IN | RESPIRATION RATE: 18 BRPM | OXYGEN SATURATION: 100 %

## 2019-01-22 DIAGNOSIS — R07.9 CHEST PAIN, UNSPECIFIED TYPE: Primary | ICD-10-CM

## 2019-01-22 DIAGNOSIS — H65.191 ACUTE EFFUSION OF RIGHT EAR: ICD-10-CM

## 2019-01-22 LAB
ALBUMIN SERPL-MCNC: 4 G/DL (ref 3.5–5)
ALBUMIN/GLOB SERPL: 1 {RATIO} (ref 1.1–2.2)
ALP SERPL-CCNC: 73 U/L (ref 45–117)
ALT SERPL-CCNC: 26 U/L (ref 12–78)
ANION GAP SERPL CALC-SCNC: 10 MMOL/L (ref 5–15)
APPEARANCE UR: CLEAR
AST SERPL-CCNC: 17 U/L (ref 15–37)
BACTERIA URNS QL MICRO: NEGATIVE /HPF
BASOPHILS # BLD: 0 K/UL (ref 0–0.1)
BASOPHILS NFR BLD: 1 % (ref 0–1)
BILIRUB SERPL-MCNC: 0.3 MG/DL (ref 0.2–1)
BILIRUB UR QL: NEGATIVE
BUN SERPL-MCNC: 13 MG/DL (ref 6–20)
BUN/CREAT SERPL: 16 (ref 12–20)
CALCIUM SERPL-MCNC: 9.6 MG/DL (ref 8.5–10.1)
CHLORIDE SERPL-SCNC: 103 MMOL/L (ref 97–108)
CO2 SERPL-SCNC: 26 MMOL/L (ref 21–32)
COLOR UR: ABNORMAL
CREAT SERPL-MCNC: 0.82 MG/DL (ref 0.55–1.02)
D DIMER PPP FEU-MCNC: 0.23 MG/L FEU (ref 0–0.65)
DIFFERENTIAL METHOD BLD: NORMAL
EOSINOPHIL # BLD: 0.1 K/UL (ref 0–0.4)
EOSINOPHIL NFR BLD: 1 % (ref 0–7)
EPITH CASTS URNS QL MICRO: ABNORMAL /LPF
ERYTHROCYTE [DISTWIDTH] IN BLOOD BY AUTOMATED COUNT: 12.8 % (ref 11.5–14.5)
GLOBULIN SER CALC-MCNC: 4.1 G/DL (ref 2–4)
GLUCOSE SERPL-MCNC: 96 MG/DL (ref 65–100)
GLUCOSE UR STRIP.AUTO-MCNC: NEGATIVE MG/DL
HCT VFR BLD AUTO: 39.4 % (ref 35–47)
HGB BLD-MCNC: 13.1 G/DL (ref 11.5–16)
HGB UR QL STRIP: ABNORMAL
IMM GRANULOCYTES # BLD AUTO: 0 K/UL (ref 0–0.04)
IMM GRANULOCYTES NFR BLD AUTO: 0 % (ref 0–0.5)
KETONES UR QL STRIP.AUTO: NEGATIVE MG/DL
LEUKOCYTE ESTERASE UR QL STRIP.AUTO: NEGATIVE
LYMPHOCYTES # BLD: 2.5 K/UL (ref 0.8–3.5)
LYMPHOCYTES NFR BLD: 29 % (ref 12–49)
MCH RBC QN AUTO: 29.6 PG (ref 26–34)
MCHC RBC AUTO-ENTMCNC: 33.2 G/DL (ref 30–36.5)
MCV RBC AUTO: 89.1 FL (ref 80–99)
MONOCYTES # BLD: 0.5 K/UL (ref 0–1)
MONOCYTES NFR BLD: 6 % (ref 5–13)
NEUTS SEG # BLD: 5.4 K/UL (ref 1.8–8)
NEUTS SEG NFR BLD: 63 % (ref 32–75)
NITRITE UR QL STRIP.AUTO: NEGATIVE
NRBC # BLD: 0 K/UL (ref 0–0.01)
NRBC BLD-RTO: 0 PER 100 WBC
PH UR STRIP: 6.5 [PH] (ref 5–8)
PLATELET # BLD AUTO: 316 K/UL (ref 150–400)
PMV BLD AUTO: 10.4 FL (ref 8.9–12.9)
POTASSIUM SERPL-SCNC: 3.7 MMOL/L (ref 3.5–5.1)
PROT SERPL-MCNC: 8.1 G/DL (ref 6.4–8.2)
PROT UR STRIP-MCNC: NEGATIVE MG/DL
RBC # BLD AUTO: 4.42 M/UL (ref 3.8–5.2)
RBC #/AREA URNS HPF: ABNORMAL /HPF (ref 0–5)
SODIUM SERPL-SCNC: 139 MMOL/L (ref 136–145)
SP GR UR REFRACTOMETRY: <1.005 (ref 1–1.03)
TROPONIN I SERPL-MCNC: <0.05 NG/ML
UA: UC IF INDICATED,UAUC: ABNORMAL
UROBILINOGEN UR QL STRIP.AUTO: 0.2 EU/DL (ref 0.2–1)
WBC # BLD AUTO: 8.5 K/UL (ref 3.6–11)
WBC URNS QL MICRO: ABNORMAL /HPF (ref 0–4)

## 2019-01-22 PROCEDURE — 96361 HYDRATE IV INFUSION ADD-ON: CPT

## 2019-01-22 PROCEDURE — 85025 COMPLETE CBC W/AUTO DIFF WBC: CPT

## 2019-01-22 PROCEDURE — 93005 ELECTROCARDIOGRAM TRACING: CPT

## 2019-01-22 PROCEDURE — 74011250637 HC RX REV CODE- 250/637: Performed by: EMERGENCY MEDICINE

## 2019-01-22 PROCEDURE — 36415 COLL VENOUS BLD VENIPUNCTURE: CPT

## 2019-01-22 PROCEDURE — 74011250636 HC RX REV CODE- 250/636: Performed by: EMERGENCY MEDICINE

## 2019-01-22 PROCEDURE — 80053 COMPREHEN METABOLIC PANEL: CPT

## 2019-01-22 PROCEDURE — 96360 HYDRATION IV INFUSION INIT: CPT

## 2019-01-22 PROCEDURE — 99285 EMERGENCY DEPT VISIT HI MDM: CPT

## 2019-01-22 PROCEDURE — 85379 FIBRIN DEGRADATION QUANT: CPT

## 2019-01-22 PROCEDURE — 81001 URINALYSIS AUTO W/SCOPE: CPT

## 2019-01-22 PROCEDURE — 71046 X-RAY EXAM CHEST 2 VIEWS: CPT

## 2019-01-22 PROCEDURE — 84484 ASSAY OF TROPONIN QUANT: CPT

## 2019-01-22 RX ORDER — MECLIZINE HYDROCHLORIDE 25 MG/1
25 TABLET ORAL
Qty: 20 TAB | Refills: 0 | Status: SHIPPED | OUTPATIENT
Start: 2019-01-22 | End: 2019-02-01

## 2019-01-22 RX ORDER — DOXYCYCLINE HYCLATE 100 MG
100 TABLET ORAL 2 TIMES DAILY
Qty: 14 TAB | Refills: 0 | Status: SHIPPED | OUTPATIENT
Start: 2019-01-22 | End: 2019-01-29

## 2019-01-22 RX ORDER — ONDANSETRON 4 MG/1
8 TABLET, ORALLY DISINTEGRATING ORAL
Status: COMPLETED | OUTPATIENT
Start: 2019-01-22 | End: 2019-01-22

## 2019-01-22 RX ADMIN — SODIUM CHLORIDE 1000 ML: 900 INJECTION, SOLUTION INTRAVENOUS at 16:17

## 2019-01-22 RX ADMIN — ONDANSETRON 8 MG: 4 TABLET, ORALLY DISINTEGRATING ORAL at 17:34

## 2019-01-22 NOTE — DISCHARGE INSTRUCTIONS

## 2019-01-22 NOTE — ED TRIAGE NOTES
Patient presents to the Ed with c/o chest pain since last night. Pt reports taking tylenol. Pt reports left sided chest pain that radiates to the right. Pt denies any alleviating or aggravating factors. Pt reports a heart murmur that was diagnosed by her PCP. Pt is alert and oriented. Pt skin is warm and dry. Pt is ambulatory independently. Emergency Department Nursing Plan of Care The Nursing Plan of Care is developed from the Nursing assessment and Emergency Department Attending provider initial evaluation. The plan of care may be reviewed in the ED Provider note. The Plan of Care was developed with the following considerations:  
Patient / Family readiness to learn indicated by:verbalized understanding Persons(s) to be included in education: patient Barriers to Learning/Limitations:No 
 
Signed Belinda Spencer 1/22/2019   3:04 PM

## 2019-01-22 NOTE — ED PROVIDER NOTES
EMERGENCY DEPARTMENT HISTORY AND PHYSICAL EXAM 
 
 
Date: 1/22/2019 Patient Name: Darrion Samuels History of Presenting Illness Chief Complaint Patient presents with  Chest Pain History Provided By: Patient HPI: Darrion Samuels, 44 y.o. female with PMHx significant for HTN, seizures, murmur, presents via EMS to the ED with cc of intermittent aching CP x 1 day. Pt endorses associated sxs of lightheadedness, dizziness, and feeling dehydrated. Pt notes she was laying in bed last night when it started. Pt states she has been to [facility] for the same sxs before and they referred her to a cardiologist. Pt denies f/u with cardiologist. Pt denies being on any kind of hormone treatment. Pt denies any hx of blood clots. Pt denies smoking tobacco. 
 
There are no other complaints, changes, or physical findings at this time. PCP: Quinton Marsh MD 
 
No current facility-administered medications on file prior to encounter. Current Outpatient Medications on File Prior to Encounter Medication Sig Dispense Refill  amLODIPine (NORVASC) 5 mg tablet Take 5 mg by mouth two (2) times a day.  butalbital-acetaminophen-caff (FIORICET) -40 mg per capsule Take 1 Cap by mouth every four (4) hours as needed for Pain. 10 Cap 0  
 ondansetron (ZOFRAN ODT) 4 mg disintegrating tablet Take 1 Tab by mouth every eight (8) hours as needed for Nausea. 8 Tab 0  
 traZODone (DESYREL) 150 mg tablet Take 150 mg by mouth nightly. Past History Past Medical History: 
Past Medical History:  
Diagnosis Date  Hypertension  Murmur  Seizures ([address] Past Surgical History: 
Past Surgical History:  
Procedure Laterality Date  HX CHOLECYSTECTOMY Family History: 
History reviewed. No pertinent family history. Social History: 
Social History Tobacco Use  Smoking status: Current Every Day Smoker  Smokeless tobacco: Never Used Substance Use Topics  Alcohol use:  Yes  
  Drug use: No  
 
 
Allergies: Allergies Allergen Reactions  Penicillin G Hoarseness Review of Systems Review of Systems Constitutional: Negative. Negative for chills and fever. HENT: Negative. Negative for congestion and rhinorrhea. Respiratory: Negative. Negative for cough, chest tightness and wheezing. Cardiovascular: Positive for chest pain. Negative for palpitations. Gastrointestinal: Negative. Negative for abdominal pain, constipation, nausea and vomiting. Endocrine: Negative. Genitourinary: Negative. Negative for decreased urine volume, flank pain, hematuria and pelvic pain. Musculoskeletal: Negative. Negative for back pain and neck pain. Skin: Negative. Negative for color change, pallor and rash. Neurological: Positive for dizziness and light-headedness. Negative for seizures, weakness, numbness and headaches. Hematological: Negative. Negative for adenopathy. Psychiatric/Behavioral: Negative. All other systems reviewed and are negative. Physical Exam  
Physical Exam  
Constitutional: She is oriented to person, place, and time. She appears well-developed and well-nourished. No distress. HENT:  
Head: Normocephalic and atraumatic. Mouth/Throat: No oropharyngeal exudate. Eyes: Conjunctivae are normal. Pupils are equal, round, and reactive to light. Right eye exhibits no discharge. Left eye exhibits no discharge. No scleral icterus. Neck: Normal range of motion. Neck supple. No JVD present. Cardiovascular: Normal rate, regular rhythm and intact distal pulses. Exam reveals no gallop and no friction rub. Murmur heard. Pulmonary/Chest: Effort normal and breath sounds normal. No stridor. No respiratory distress. She has no wheezes. She has no rales. She exhibits no tenderness. Abdominal: Soft. Bowel sounds are normal. She exhibits no distension and no mass. There is no tenderness. There is no rebound and no guarding. Musculoskeletal: Right lower leg: Normal.  
     Left lower leg: Normal.  
Neurological: She is alert and oriented to person, place, and time. She displays normal reflexes. No cranial nerve deficit. She exhibits normal muscle tone. Coordination normal.  
Skin: Skin is warm. No rash noted. She is not diaphoretic. No pallor. Vitals reviewed. Diagnostic Study Results Labs - Recent Results (from the past 12 hour(s)) EKG, 12 LEAD, INITIAL Collection Time: 01/22/19  3:12 PM  
Result Value Ref Range Ventricular Rate 57 BPM  
 Atrial Rate 57 BPM  
 P-R Interval 212 ms QRS Duration 90 ms Q-T Interval 392 ms QTC Calculation (Bezet) 381 ms Calculated P Axis 9 degrees Calculated R Axis 0 degrees Calculated T Axis 9 degrees Diagnosis Sinus bradycardia with 1st degree AV block Minimal voltage criteria for LVH, may be normal variant Borderline ECG When compared with ECG of 30-DEC-2018 14:49, No significant change was found CBC WITH AUTOMATED DIFF Collection Time: 01/22/19  4:15 PM  
Result Value Ref Range WBC 8.5 3.6 - 11.0 K/uL  
 RBC 4.42 3.80 - 5.20 M/uL  
 HGB 13.1 11.5 - 16.0 g/dL HCT 39.4 35.0 - 47.0 % MCV 89.1 80.0 - 99.0 FL  
 MCH 29.6 26.0 - 34.0 PG  
 MCHC 33.2 30.0 - 36.5 g/dL  
 RDW 12.8 11.5 - 14.5 % PLATELET 855 113 - 713 K/uL MPV 10.4 8.9 - 12.9 FL  
 NRBC 0.0 0  WBC ABSOLUTE NRBC 0.00 0.00 - 0.01 K/uL NEUTROPHILS 63 32 - 75 % LYMPHOCYTES 29 12 - 49 % MONOCYTES 6 5 - 13 % EOSINOPHILS 1 0 - 7 % BASOPHILS 1 0 - 1 % IMMATURE GRANULOCYTES 0 0.0 - 0.5 % ABS. NEUTROPHILS 5.4 1.8 - 8.0 K/UL  
 ABS. LYMPHOCYTES 2.5 0.8 - 3.5 K/UL  
 ABS. MONOCYTES 0.5 0.0 - 1.0 K/UL  
 ABS. EOSINOPHILS 0.1 0.0 - 0.4 K/UL  
 ABS. BASOPHILS 0.0 0.0 - 0.1 K/UL  
 ABS. IMM. GRANS. 0.0 0.00 - 0.04 K/UL  
 DF AUTOMATED    
D DIMER Collection Time: 01/22/19  4:15 PM  
Result Value Ref Range  D-dimer 0.23 0.00 - 0.65 mg/L FEU  
 METABOLIC PANEL, COMPREHENSIVE Collection Time: 01/22/19  4:15 PM  
Result Value Ref Range Sodium 139 136 - 145 mmol/L Potassium 3.7 3.5 - 5.1 mmol/L Chloride 103 97 - 108 mmol/L  
 CO2 26 21 - 32 mmol/L Anion gap 10 5 - 15 mmol/L Glucose 96 65 - 100 mg/dL BUN 13 6 - 20 MG/DL Creatinine 0.82 0.55 - 1.02 MG/DL  
 BUN/Creatinine ratio 16 12 - 20 GFR est AA >60 >60 ml/min/1.73m2 GFR est non-AA >60 >60 ml/min/1.73m2 Calcium 9.6 8.5 - 10.1 MG/DL Bilirubin, total 0.3 0.2 - 1.0 MG/DL  
 ALT (SGPT) 26 12 - 78 U/L  
 AST (SGOT) 17 15 - 37 U/L Alk. phosphatase 73 45 - 117 U/L Protein, total 8.1 6.4 - 8.2 g/dL Albumin 4.0 3.5 - 5.0 g/dL Globulin 4.1 (H) 2.0 - 4.0 g/dL A-G Ratio 1.0 (L) 1.1 - 2.2    
TROPONIN I Collection Time: 01/22/19  4:15 PM  
Result Value Ref Range Troponin-I, Qt. <0.05 <0.05 ng/mL URINALYSIS W/ REFLEX CULTURE Collection Time: 01/22/19  4:15 PM  
Result Value Ref Range Color YELLOW/STRAW Appearance CLEAR CLEAR Specific gravity <1.005 1.003 - 1.030  
 pH (UA) 6.5 5.0 - 8.0 Protein NEGATIVE  NEG mg/dL Glucose NEGATIVE  NEG mg/dL Ketone NEGATIVE  NEG mg/dL Bilirubin NEGATIVE  NEG Blood LARGE (A) NEG Urobilinogen 0.2 0.2 - 1.0 EU/dL Nitrites NEGATIVE  NEG Leukocyte Esterase NEGATIVE  NEG    
 WBC 0-4 0 - 4 /hpf  
 RBC 0-5 0 - 5 /hpf Epithelial cells FEW FEW /lpf Bacteria NEGATIVE  NEG /hpf  
 UA:UC IF INDICATED CULTURE NOT INDICATED BY UA RESULT CNI Radiologic Studies -  
XR CHEST PA LAT Final Result Impression: No acute process or change compared to the prior exam.  
  
  
 
CXR Results  (Last 48 hours) 01/22/19 1606  XR CHEST PA LAT Final result Impression:  Impression: No acute process or change compared to the prior exam.  
   
  
 Narrative:  Exam:  2 view chest  
   
Indication: Left chest pain since last night Comparison to 3/13/2017. PA and lateral views demonstrate normal heart size. There is no acute process in  
the lung fields. The osseous structures are unremarkable. Medical Decision Making I am the first provider for this patient. I reviewed the vital signs, available nursing notes, past medical history, past surgical history, family history and social history. Vital Signs-Reviewed the patient's vital signs. Patient Vitals for the past 12 hrs: 
 Temp Pulse Resp BP SpO2  
01/22/19 1700    131/87   
01/22/19 1624  63  123/74   
01/22/19 1623  (!) 58  125/74   
01/22/19 1622  (!) 53  116/71   
01/22/19 1500 98.2 °F (36.8 °C) (!) 56 18 114/69 100 % Pulse Oximetry Analysis - 100% on RA Cardiac Monitor:  
Rate: 56 bpm 
 
EKG interpretation: (Preliminary) 15:12 Rhythm: sinus bradycardia with 1st degree AV block. Rate (approx.): 57; [place] interval: 212; QRS interval: 90; QT/QTc: 392/381; ST/T wave: normal; Other findings: Minimal voltage criteria for LVH, may be normal variant. Written by Mirta Lynn ED Scribe, as dictated by Trini Acosta MD. Records Reviewed: Nursing Notes, Old Medical Records, Previous electrocardiograms, Ambulance Run Sheet, Previous Radiology Studies and Previous Laboratory Studies Provider Notes (Medical Decision Making): DDx: reflux, PE, pneumothorax, pericarditis, coronary syndrome, peptic ulcer disease, chest was pain, dehydration. Impression/Plan: History of HTN, to the ER with atypical CP and dizziness. Has a heart score < 3. Will obtain labs including troponin and D-dimer, and if normal will have close follow up to cardiology. ED Course:  
Initial assessment performed. The patients presenting problems have been discussed, and they are in agreement with the care plan formulated and outlined with them. I have encouraged them to ask questions as they arise throughout their visit.  
   
PROGRESS NOTE: 
5:36 PM 
 Results discussed with pt. Complaining of ear pain and recently on abx. Has persistent effusion which could be contributing to her dizziness. Written by Juancarlos Stiles, ED Scribe, as dictated by Bhakti Fry MD. Critical Care Time:  
0 Disposition: 
DISCHARGE NOTE: 
5:36 PM 
The patient is ready for discharge. The patients signs, symptoms, diagnosis, and instructions for discharge have been discussed and the pt has conveyed their understanding. The patient is to follow up as recommended with PCP or return to the ER should their symptoms worsen. Plan has been discussed and patient has conveyed their agreement. PLAN: 
1. Current Discharge Medication List  
  
START taking these medications Details  
meclizine (ANTIVERT) 25 mg tablet Take 1 Tab by mouth three (3) times daily as needed for up to 10 days. Qty: 20 Tab, Refills: 0  
  
doxycycline (VIBRA-TABS) 100 mg tablet Take 1 Tab by mouth two (2) times a day for 7 days. Qty: 14 Tab, Refills: 0  
  
  
 
2. Follow-up Information None Return to ED if worse Diagnosis Clinical Impression: 1. Chest pain, unspecified type 2. Acute effusion of right ear Attestations: This note is prepared by Juancarlos Stiles, acting as Scribe for Bhakti Fry MD. Bhakti Fry MD: The scribe's documentation has been prepared under my direction and personally reviewed by me in its entirety. I confirm that the note above accurately reflects all work, treatment, procedures, and medical decision making performed by me.

## 2019-01-23 LAB
ATRIAL RATE: 57 BPM
CALCULATED P AXIS, ECG09: 9 DEGREES
CALCULATED R AXIS, ECG10: 0 DEGREES
CALCULATED T AXIS, ECG11: 9 DEGREES
DIAGNOSIS, 93000: NORMAL
P-R INTERVAL, ECG05: 212 MS
Q-T INTERVAL, ECG07: 392 MS
QRS DURATION, ECG06: 90 MS
QTC CALCULATION (BEZET), ECG08: 381 MS
VENTRICULAR RATE, ECG03: 57 BPM

## 2019-02-10 ENCOUNTER — APPOINTMENT (OUTPATIENT)
Dept: CT IMAGING | Age: 44
End: 2019-02-10
Attending: EMERGENCY MEDICINE
Payer: MEDICARE

## 2019-02-10 ENCOUNTER — HOSPITAL ENCOUNTER (EMERGENCY)
Age: 44
Discharge: HOME | End: 2019-02-10
Attending: EMERGENCY MEDICINE
Payer: MEDICARE

## 2019-02-10 VITALS
RESPIRATION RATE: 16 BRPM | BODY MASS INDEX: 31.66 KG/M2 | HEIGHT: 66 IN | TEMPERATURE: 97.4 F | DIASTOLIC BLOOD PRESSURE: 70 MMHG | SYSTOLIC BLOOD PRESSURE: 124 MMHG | OXYGEN SATURATION: 100 % | WEIGHT: 197 LBS

## 2019-02-10 DIAGNOSIS — R20.0 RIGHT LEG NUMBNESS: Primary | ICD-10-CM

## 2019-02-10 LAB
AMPHET UR QL SCN: NEGATIVE
ANION GAP SERPL CALC-SCNC: 4 MMOL/L (ref 5–15)
APPEARANCE UR: CLEAR
BACTERIA URNS QL MICRO: NEGATIVE /HPF
BARBITURATES UR QL SCN: NEGATIVE
BASOPHILS # BLD: 0 K/UL (ref 0–0.1)
BASOPHILS NFR BLD: 1 % (ref 0–1)
BENZODIAZ UR QL: NEGATIVE
BILIRUB UR QL: NEGATIVE
BUN SERPL-MCNC: 13 MG/DL (ref 6–20)
BUN/CREAT SERPL: 16 (ref 12–20)
CALCIUM SERPL-MCNC: 9.2 MG/DL (ref 8.5–10.1)
CANNABINOIDS UR QL SCN: NEGATIVE
CHLORIDE SERPL-SCNC: 103 MMOL/L (ref 97–108)
CO2 SERPL-SCNC: 29 MMOL/L (ref 21–32)
COCAINE UR QL SCN: NEGATIVE
COLOR UR: NORMAL
CREAT SERPL-MCNC: 0.8 MG/DL (ref 0.55–1.02)
DIFFERENTIAL METHOD BLD: NORMAL
DRUG SCRN COMMENT,DRGCM: NORMAL
EOSINOPHIL # BLD: 0.2 K/UL (ref 0–0.4)
EOSINOPHIL NFR BLD: 3 % (ref 0–7)
EPITH CASTS URNS QL MICRO: NORMAL /LPF
ERYTHROCYTE [DISTWIDTH] IN BLOOD BY AUTOMATED COUNT: 13 % (ref 11.5–14.5)
GLUCOSE SERPL-MCNC: 94 MG/DL (ref 65–100)
GLUCOSE UR STRIP.AUTO-MCNC: NEGATIVE MG/DL
HCG UR QL: NEGATIVE
HCT VFR BLD AUTO: 40.5 % (ref 35–47)
HGB BLD-MCNC: 13.3 G/DL (ref 11.5–16)
HGB UR QL STRIP: NEGATIVE
IMM GRANULOCYTES # BLD AUTO: 0 K/UL (ref 0–0.04)
IMM GRANULOCYTES NFR BLD AUTO: 0 % (ref 0–0.5)
KETONES UR QL STRIP.AUTO: NEGATIVE MG/DL
LEUKOCYTE ESTERASE UR QL STRIP.AUTO: NEGATIVE
LYMPHOCYTES # BLD: 1.9 K/UL (ref 0.8–3.5)
LYMPHOCYTES NFR BLD: 27 % (ref 12–49)
MCH RBC QN AUTO: 29.7 PG (ref 26–34)
MCHC RBC AUTO-ENTMCNC: 32.8 G/DL (ref 30–36.5)
MCV RBC AUTO: 90.4 FL (ref 80–99)
METHADONE UR QL: NEGATIVE
MONOCYTES # BLD: 0.5 K/UL (ref 0–1)
MONOCYTES NFR BLD: 6 % (ref 5–13)
NEUTS SEG # BLD: 4.5 K/UL (ref 1.8–8)
NEUTS SEG NFR BLD: 63 % (ref 32–75)
NITRITE UR QL STRIP.AUTO: NEGATIVE
NRBC # BLD: 0 K/UL (ref 0–0.01)
NRBC BLD-RTO: 0 PER 100 WBC
OPIATES UR QL: NEGATIVE
PCP UR QL: NEGATIVE
PH UR STRIP: 6.5 [PH] (ref 5–8)
PLATELET # BLD AUTO: 286 K/UL (ref 150–400)
PMV BLD AUTO: 10.5 FL (ref 8.9–12.9)
POTASSIUM SERPL-SCNC: 3.9 MMOL/L (ref 3.5–5.1)
PROT UR STRIP-MCNC: NEGATIVE MG/DL
RBC # BLD AUTO: 4.48 M/UL (ref 3.8–5.2)
RBC #/AREA URNS HPF: NORMAL /HPF (ref 0–5)
SODIUM SERPL-SCNC: 136 MMOL/L (ref 136–145)
SP GR UR REFRACTOMETRY: <1.005 (ref 1–1.03)
TROPONIN I SERPL-MCNC: <0.05 NG/ML
UA: UC IF INDICATED,UAUC: NORMAL
UROBILINOGEN UR QL STRIP.AUTO: 0.2 EU/DL (ref 0.2–1)
WBC # BLD AUTO: 7.1 K/UL (ref 3.6–11)
WBC URNS QL MICRO: NORMAL /HPF (ref 0–4)

## 2019-02-10 PROCEDURE — 80307 DRUG TEST PRSMV CHEM ANLYZR: CPT

## 2019-02-10 PROCEDURE — 81025 URINE PREGNANCY TEST: CPT

## 2019-02-10 PROCEDURE — 96360 HYDRATION IV INFUSION INIT: CPT

## 2019-02-10 PROCEDURE — 74011250636 HC RX REV CODE- 250/636: Performed by: EMERGENCY MEDICINE

## 2019-02-10 PROCEDURE — 85025 COMPLETE CBC W/AUTO DIFF WBC: CPT

## 2019-02-10 PROCEDURE — 70450 CT HEAD/BRAIN W/O DYE: CPT

## 2019-02-10 PROCEDURE — 99284 EMERGENCY DEPT VISIT MOD MDM: CPT

## 2019-02-10 PROCEDURE — 84484 ASSAY OF TROPONIN QUANT: CPT

## 2019-02-10 PROCEDURE — 36415 COLL VENOUS BLD VENIPUNCTURE: CPT

## 2019-02-10 PROCEDURE — 93005 ELECTROCARDIOGRAM TRACING: CPT

## 2019-02-10 PROCEDURE — 81001 URINALYSIS AUTO W/SCOPE: CPT

## 2019-02-10 PROCEDURE — 80048 BASIC METABOLIC PNL TOTAL CA: CPT

## 2019-02-10 RX ORDER — SODIUM CHLORIDE 0.9 % (FLUSH) 0.9 %
5-40 SYRINGE (ML) INJECTION EVERY 8 HOURS
Status: DISCONTINUED | OUTPATIENT
Start: 2019-02-10 | End: 2019-02-10 | Stop reason: HOSPADM

## 2019-02-10 RX ORDER — SODIUM CHLORIDE 9 MG/ML
150 INJECTION, SOLUTION INTRAVENOUS CONTINUOUS
Status: DISCONTINUED | OUTPATIENT
Start: 2019-02-10 | End: 2019-02-10 | Stop reason: HOSPADM

## 2019-02-10 RX ADMIN — SODIUM CHLORIDE 150 ML/HR: 900 INJECTION, SOLUTION INTRAVENOUS at 19:36

## 2019-02-10 NOTE — ED PROVIDER NOTES
EMERGENCY DEPARTMENT HISTORY AND PHYSICAL EXAM 
 
 
Date: 2/10/2019 Patient Name: Isis Grossman History of Presenting Illness Chief Complaint Patient presents with  Numbness History Provided By: Patient HPI: Isis Grossman, 44 y.o. female with PMHx significant for HTN, seizures, murmur, presents via EMS to the ED with cc of new onset moderate R leg weakness, ongoing for 20 minutes. Pt reports lower back pain, HA, and intermittent blurry vision alongside cc. She notes HA exacerbation when standing. Pt states that she has had previous similar headaches and back pain but not with visual disturbances. She discloses that her last menstrual cycle was 1 week ago. Pt denies any other alleviating or exacerbating factors. She specifically denies any CP, SOB, nausea, vomiting, fevers, chills, cough, abdominal pain, urinary sxs or diarrhea. There are no other complaints, changes, or physical findings at this time. PCP: Heraclio Johnson MD 
SHx: (+)smoker, (+)EtOH use, (-)illicit drug use No current facility-administered medications on file prior to encounter. Current Outpatient Medications on File Prior to Encounter Medication Sig Dispense Refill  amLODIPine (NORVASC) 5 mg tablet Take 5 mg by mouth two (2) times a day.  butalbital-acetaminophen-caff (FIORICET) -40 mg per capsule Take 1 Cap by mouth every four (4) hours as needed for Pain. 10 Cap 0  
 traZODone (DESYREL) 150 mg tablet Take 150 mg by mouth nightly. Past History Past Medical History: 
Past Medical History:  
Diagnosis Date  Hypertension  Murmur  Seizures ([address] Past Surgical History: 
Past Surgical History:  
Procedure Laterality Date  HX CHOLECYSTECTOMY Family History: 
History reviewed. No pertinent family history. Social History: 
Social History Tobacco Use  Smoking status: Current Every Day Smoker  Smokeless tobacco: Never Used Substance Use Topics  Alcohol use: Yes  Drug use: No  
 
 
Allergies: Allergies Allergen Reactions  Penicillin G Hoarseness Review of Systems Review of Systems Constitutional: Negative for chills and fever. HENT: Negative for sore throat. Eyes: Positive for visual disturbance (blurry). Negative for photophobia and redness. Respiratory: Negative for shortness of breath and wheezing. Cardiovascular: Negative for chest pain and leg swelling. Gastrointestinal: Negative for abdominal pain, blood in stool, nausea and vomiting. Genitourinary: Negative for difficulty urinating, dysuria, hematuria, menstrual problem and vaginal bleeding. Musculoskeletal: Positive for back pain (lower). Negative for joint swelling. Neurological: Positive for weakness (R leg) and headaches. Negative for dizziness, seizures, syncope, speech difficulty and numbness. Hematological: Negative for adenopathy. Psychiatric/Behavioral: Negative for agitation, confusion and suicidal ideas. The patient is not nervous/anxious. Physical Exam  
Physical Exam  
Constitutional: She is oriented to person, place, and time. She appears well-developed and well-nourished. HENT:  
Head: Normocephalic and atraumatic. Mouth/Throat: Oropharynx is clear and moist.  
Eyes: Conjunctivae and EOM are normal.  
Neck: Normal range of motion and full passive range of motion without pain. Neck supple. Cardiovascular: Normal rate, regular rhythm, S1 normal, S2 normal, normal heart sounds, intact distal pulses and normal pulses. No murmur heard. Pulmonary/Chest: Effort normal and breath sounds normal. No respiratory distress. She has no wheezes. Abdominal: Soft. Normal appearance and bowel sounds are normal. She exhibits no distension. There is no tenderness. There is no rebound. Musculoskeletal: Normal range of motion. Neurological: She is alert and oriented to person, place, and time. She has normal strength. No focal deficits 5/5 strength in upper and lower extremities Skin: Skin is warm, dry and intact. No rash noted. Psychiatric: She has a normal mood and affect. Her speech is normal and behavior is normal. Judgment and thought content normal.  
Nursing note and vitals reviewed. Diagnostic Study Results Labs - Recent Results (from the past 12 hour(s)) EKG, 12 LEAD, INITIAL Collection Time: 02/10/19  6:44 PM  
Result Value Ref Range Ventricular Rate 63 BPM  
 Atrial Rate 63 BPM  
 P-R Interval 204 ms QRS Duration 92 ms Q-T Interval 388 ms QTC Calculation (Bezet) 397 ms Calculated P Axis 37 degrees Calculated R Axis -6 degrees Calculated T Axis 31 degrees Diagnosis Normal sinus rhythm Minimal voltage criteria for LVH, may be normal variant Borderline ECG When compared with ECG of 22-JAN-2019 15:12, No significant change was found CBC WITH AUTOMATED DIFF Collection Time: 02/10/19  7:40 PM  
Result Value Ref Range WBC 7.1 3.6 - 11.0 K/uL  
 RBC 4.48 3.80 - 5.20 M/uL  
 HGB 13.3 11.5 - 16.0 g/dL HCT 40.5 35.0 - 47.0 % MCV 90.4 80.0 - 99.0 FL  
 MCH 29.7 26.0 - 34.0 PG  
 MCHC 32.8 30.0 - 36.5 g/dL  
 RDW 13.0 11.5 - 14.5 % PLATELET 349 548 - 738 K/uL MPV 10.5 8.9 - 12.9 FL  
 NRBC 0.0 0  WBC ABSOLUTE NRBC 0.00 0.00 - 0.01 K/uL NEUTROPHILS 63 32 - 75 % LYMPHOCYTES 27 12 - 49 % MONOCYTES 6 5 - 13 % EOSINOPHILS 3 0 - 7 % BASOPHILS 1 0 - 1 % IMMATURE GRANULOCYTES 0 0.0 - 0.5 % ABS. NEUTROPHILS 4.5 1.8 - 8.0 K/UL  
 ABS. LYMPHOCYTES 1.9 0.8 - 3.5 K/UL  
 ABS. MONOCYTES 0.5 0.0 - 1.0 K/UL  
 ABS. EOSINOPHILS 0.2 0.0 - 0.4 K/UL  
 ABS. BASOPHILS 0.0 0.0 - 0.1 K/UL  
 ABS. IMM. GRANS. 0.0 0.00 - 0.04 K/UL  
 DF AUTOMATED METABOLIC PANEL, BASIC Collection Time: 02/10/19  7:40 PM  
Result Value Ref Range Sodium 136 136 - 145 mmol/L Potassium 3.9 3.5 - 5.1 mmol/L  Chloride 103 97 - 108 mmol/L  
 CO2 29 21 - 32 mmol/L  
 Anion gap 4 (L) 5 - 15 mmol/L Glucose 94 65 - 100 mg/dL BUN 13 6 - 20 MG/DL Creatinine 0.80 0.55 - 1.02 MG/DL  
 BUN/Creatinine ratio 16 12 - 20 GFR est AA >60 >60 ml/min/1.73m2 GFR est non-AA >60 >60 ml/min/1.73m2 Calcium 9.2 8.5 - 10.1 MG/DL  
TROPONIN I Collection Time: 02/10/19  7:40 PM  
Result Value Ref Range Troponin-I, Qt. <0.05 <0.05 ng/mL DRUG SCREEN, URINE Collection Time: 02/10/19  7:40 PM  
Result Value Ref Range AMPHETAMINES NEGATIVE  NEG    
 BARBITURATES NEGATIVE  NEG BENZODIAZEPINES NEGATIVE  NEG    
 COCAINE NEGATIVE  NEG METHADONE NEGATIVE  NEG    
 OPIATES NEGATIVE  NEG    
 PCP(PHENCYCLIDINE) NEGATIVE  NEG    
 THC (TH-CANNABINOL) NEGATIVE  NEG Drug screen comment (NOTE) URINALYSIS W/ REFLEX CULTURE Collection Time: 02/10/19  7:40 PM  
Result Value Ref Range Color YELLOW/STRAW Appearance CLEAR CLEAR Specific gravity <1.005 1.003 - 1.030  
 pH (UA) 6.5 5.0 - 8.0 Protein NEGATIVE  NEG mg/dL Glucose NEGATIVE  NEG mg/dL Ketone NEGATIVE  NEG mg/dL Bilirubin NEGATIVE  NEG Blood NEGATIVE  NEG Urobilinogen 0.2 0.2 - 1.0 EU/dL Nitrites NEGATIVE  NEG Leukocyte Esterase NEGATIVE  NEG    
 WBC 0-4 0 - 4 /hpf  
 RBC 0-5 0 - 5 /hpf Epithelial cells FEW FEW /lpf Bacteria NEGATIVE  NEG /hpf  
 UA:UC IF INDICATED CULTURE NOT INDICATED BY UA RESULT CNI    
HCG URINE, QL. - POC Collection Time: 02/10/19  7:45 PM  
Result Value Ref Range Pregnancy test,urine (POC) NEGATIVE  NEG Radiologic Studies -  
 
CT Results  (Last 48 hours) 02/10/19 3177  CT HEAD WO CONT Final result Impression:  IMPRESSION: Unremarkable head CT Narrative:  EXAM: CT HEAD WO CONT INDICATION: leg numbness COMPARISON: None. CONTRAST: None. TECHNIQUE: Unenhanced CT of the head was performed using 5 mm images.  Brain and  
 bone windows were generated. CT dose reduction was achieved through use of a  
standardized protocol tailored for this examination and automatic exposure  
control for dose modulation. FINDINGS:  
The ventricles and sulci are normal in size, shape and configuration and  
midline. There is no significant white matter disease. There is no intracranial  
hemorrhage, extra-axial collection, mass, mass effect or midline shift. The  
basilar cisterns are open. No acute infarct is identified. The bone windows  
demonstrate no abnormalities. The visualized portions of the paranasal sinuses  
and mastoid air cells are clear. Medical Decision Making I am the first provider for this patient. I reviewed the vital signs, available nursing notes, past medical history, past surgical history, family history and social history. Vital Signs-Reviewed the patient's vital signs. Patient Vitals for the past 12 hrs: 
 Temp Resp BP SpO2  
02/10/19 1932    100 % 02/10/19 1931   121/81   
02/10/19 1829 97.4 °F (36.3 °C) 16 116/71 100 % Pulse Oximetry Analysis - 100% on RA 
 
ED EKG interpretation: 18:44 Rhythm: normal sinus rhythm; and regular . Rate (approx.): 63 bpm; Axis: normal; P wave: normal; QRS interval: normal ; ST/T wave: normal; Other findings: normal. 
Written by Mack Fleming ED scribe, as dictated by Yadira Elizondo MD. Records Reviewed: Nursing Notes, Old Medical Records and Previous Laboratory Studies Provider Notes (Medical Decision Making): DDx: lumbar radiculopathy, peripheral radiculopathy, TIA, substance abuse ED Course:  
Initial assessment performed. The patients presenting problems have been discussed, and they are in agreement with the care plan formulated and outlined with them. I have encouraged them to ask questions as they arise throughout their visit. ED Course as of Feb 10 2046 Lattie Drafts Feb 10, 2019 2014 Pt re-evaluated, she is feeling better. Will plan for discharge. Will follow up with PCP. [CW] ED Course User Index 
[CW] Mario Melara' D  
  
SIGN OUT 
6:43 PM 
Patient's presentation, labs/imaging and plan of care was reviewed with  as part of sign out. They will await head CT and lab work as part of the plan discussed with the patient. They will re-evaluate. 's assistance in completion of this plan is greatly appreciated, but it should be noted that I will be the provider of record for this patient. Amelie Aguilar MD 
 
Critical Care Time: 0 Disposition: 
Discharge Note: 
8:15 PM 
The pt is ready for discharge. The pt's signs, symptoms, diagnosis, and discharge instructions have been discussed and pt has conveyed their understanding. The pt is to follow up as recommended or return to ER should their symptoms worsen. Plan has been discussed and pt is in agreement. PLAN: 
1. Current Discharge Medication List  
  
 
2. Follow-up Information None Return to ED if worse Diagnosis Clinical Impression: 1. Right leg numbness Attestations: This note is prepared by Yao Fernandez, acting as a Scribe for Amelie Aguilar MD. Amelie Aguilar MD: The scribe's documentation has been prepared under my direction and personally reviewed by me in its entirety. I confirm that the notes above accurately reflects all work, treatment, procedures, and medical decision making performed by me. This note will not be viewable in [address]

## 2019-02-10 NOTE — ED NOTES
Emergency Department Nursing Plan of Care The Nursing Plan of Care is developed from the Nursing assessment and Emergency Department Attending provider initial evaluation. The plan of care may be reviewed in the ED Provider note. The Plan of Care was developed with the following considerations:  
Patient / Family readiness to learn indicated by:verbalized understanding Persons(s) to be included in education: patient Barriers to Learning/Limitations:No 
 
Signed Ezra Kelley 2/10/2019   6:33 PM

## 2019-02-10 NOTE — ED TRIAGE NOTES
R leg numbness from knee down to foot and HA. Sudden onset 30 minutes ago while sitting on the toilet (not straining). Reports HA of 9/10. Negative stroke scale.

## 2019-02-10 NOTE — ED NOTES
Pt arrives in ED with complaints of right lower leg numbness starting 30 mins PTA while on toilet. Pt denies history of similar symptoms. Reports blurred vision and headache starting after leg numbness.

## 2019-02-11 LAB
ATRIAL RATE: 63 BPM
CALCULATED P AXIS, ECG09: 37 DEGREES
CALCULATED R AXIS, ECG10: -6 DEGREES
CALCULATED T AXIS, ECG11: 31 DEGREES
DIAGNOSIS, 93000: NORMAL
P-R INTERVAL, ECG05: 204 MS
Q-T INTERVAL, ECG07: 388 MS
QRS DURATION, ECG06: 92 MS
QTC CALCULATION (BEZET), ECG08: 397 MS
VENTRICULAR RATE, ECG03: 63 BPM

## 2019-02-11 NOTE — DISCHARGE INSTRUCTIONS
Patient Education        Numbness and Tingling: Care Instructions  Your Care Instructions    Many things can cause numbness or tingling. Swelling may put pressure on a nerve. This could cause you to lose feeling or have a pins-and-needles sensation on part of your body. Nerves may be damaged from trauma, toxins, or diseases, such as diabetes or multiple sclerosis (MS). Sometimes, though, the cause is not clear. If there is no clear reason for your symptoms, and you are not having any other symptoms, your doctor may suggest watching and waiting for a while to see if the numbness or tingling goes away on its own. Your doctor may want you to have blood or nerve tests to find the cause of your symptoms. Follow-up care is a key part of your treatment and safety. Be sure to make and go to all appointments, and call your doctor if you are having problems. It's also a good idea to know your test results and keep a list of the medicines you take. How can you care for yourself at home? · If your doctor prescribes medicine, take it exactly as directed. Call your doctor if you think you are having a problem with your medicine. · If you have any swelling, put ice or a cold pack on the area for 10 to 20 minutes at a time. Put a thin cloth between the ice and your skin. When should you call for help? Call 911 anytime you think you may need emergency care. For example, call if:    · You have weakness, numbness, or tingling in both legs.     · You lose bowel or bladder control.     · You have symptoms of a stroke. These may include:  ? Sudden numbness, tingling, weakness, or loss of movement in your face, arm, or leg, especially on only one side of your body. ? Sudden vision changes. ? Sudden trouble speaking. ? Sudden confusion or trouble understanding simple statements. ? Sudden problems with walking or balance.   ? A sudden, severe headache that is different from past headaches.    Watch closely for changes in your health, and be sure to contact your doctor if you have any problems, or if:    · You do not get better as expected. Where can you learn more? Go to http://fiona-paemla.info/. Enter K120 in the search box to learn more about \"Numbness and Tingling: Care Instructions. \"  Current as of: Christina 3, 2018  Content Version: 11.9  © 9458-9413 [employer]. Care instructions adapted under license by [employer] (which disclaims liability or warranty for this information). If you have questions about a medical condition or this instruction, always ask your healthcare professional. [address] any warranty or liability for your use of this information.

## 2019-02-11 NOTE — ED NOTES
Bedside and Verbal shift change report given to Polol Murdock RN (oncoming nurse) by Cheri Figueroa RN (offgoing nurse). Report included the following information SBAR and ED Summary.

## 2019-06-18 ENCOUNTER — HOSPITAL ENCOUNTER (EMERGENCY)
Age: 44
Discharge: HOME | End: 2019-06-18
Attending: EMERGENCY MEDICINE
Payer: MEDICARE

## 2019-06-18 VITALS
TEMPERATURE: 98.5 F | DIASTOLIC BLOOD PRESSURE: 89 MMHG | BODY MASS INDEX: 32.85 KG/M2 | WEIGHT: 203.5 LBS | RESPIRATION RATE: 19 BRPM | SYSTOLIC BLOOD PRESSURE: 146 MMHG | HEART RATE: 101 BPM | OXYGEN SATURATION: 99 %

## 2019-06-18 DIAGNOSIS — H92.02 POSTERIOR AURICULAR PAIN OF LEFT EAR: Primary | ICD-10-CM

## 2019-06-18 DIAGNOSIS — K02.9 DENTAL CARIES: ICD-10-CM

## 2019-06-18 LAB
APPEARANCE UR: CLEAR
BACTERIA URNS QL MICRO: NEGATIVE /HPF
BILIRUB UR QL: NEGATIVE
COLOR UR: NORMAL
EPITH CASTS URNS QL MICRO: NORMAL /LPF
GLUCOSE UR STRIP.AUTO-MCNC: NEGATIVE MG/DL
HCG UR QL: NEGATIVE
HGB UR QL STRIP: NEGATIVE
KETONES UR QL STRIP.AUTO: NEGATIVE MG/DL
LEUKOCYTE ESTERASE UR QL STRIP.AUTO: NEGATIVE
NITRITE UR QL STRIP.AUTO: NEGATIVE
PH UR STRIP: 6.5 [PH] (ref 5–8)
PROT UR STRIP-MCNC: NEGATIVE MG/DL
RBC #/AREA URNS HPF: NORMAL /HPF (ref 0–5)
SP GR UR REFRACTOMETRY: <1.005 (ref 1–1.03)
UA: UC IF INDICATED,UAUC: NORMAL
UROBILINOGEN UR QL STRIP.AUTO: 0.2 EU/DL (ref 0.2–1)
WBC URNS QL MICRO: NORMAL /HPF (ref 0–4)

## 2019-06-18 PROCEDURE — 81025 URINE PREGNANCY TEST: CPT

## 2019-06-18 PROCEDURE — 81001 URINALYSIS AUTO W/SCOPE: CPT

## 2019-06-18 PROCEDURE — 99283 EMERGENCY DEPT VISIT LOW MDM: CPT

## 2019-06-18 PROCEDURE — 74011250637 HC RX REV CODE- 250/637: Performed by: PHYSICIAN ASSISTANT

## 2019-06-18 RX ORDER — CLINDAMYCIN HYDROCHLORIDE 300 MG/1
300 CAPSULE ORAL 4 TIMES DAILY
Qty: 28 CAP | Refills: 0 | Status: SHIPPED | OUTPATIENT
Start: 2019-06-18 | End: 2019-06-25

## 2019-06-18 RX ORDER — ACETAMINOPHEN 500 MG
1000 TABLET ORAL
Status: COMPLETED | OUTPATIENT
Start: 2019-06-18 | End: 2019-06-18

## 2019-06-18 RX ADMIN — ACETAMINOPHEN 1000 MG: 500 TABLET, FILM COATED ORAL at 18:54

## 2019-06-18 NOTE — DISCHARGE INSTRUCTIONS
Patient Education        Swollen Lymph Nodes: Care Instructions  Your Care Instructions    Lymph nodes are small, bean-shaped glands throughout the body. They help your body fight germs and infections. Lymph nodes often swell when there is a problem such as an injury, infection, or tumor. · The nodes in your neck, under your chin, or behind your ears may swell when you have a cold or sore throat. · An injury or infection in a leg or foot can make the nodes in your groin swell. · Sometimes medicine can make lymph nodes swell, but this is rare. Treatment depends on what caused your nodes to swell. Usually the nodes return to normal size without a problem. Follow-up care is a key part of your treatment and safety. Be sure to make and go to all appointments, and call your doctor if you are having problems. It's also a good idea to know your test results and keep a list of the medicines you take. How can you care for yourself at home? · Take your medicines exactly as prescribed. Call your doctor if you think you are having a problem with your medicine. · Avoid irritation. ? Do not squeeze or pick at the lump. ? Do not stick a needle in it. · Prevent infection. Do not squeeze, drain, or puncture a painful lump. Doing this can irritate or inflame the lump, push any existing infection deeper into the skin, or cause severe bleeding. · Get extra rest. Slow down just a little from your usual routine. · Drink plenty of fluids, enough so that your urine is light yellow or clear like water. If you have kidney, heart, or liver disease and have to limit fluids, talk with your doctor before you increase the amount of fluids you drink. · Take an over-the-counter pain medicine, such as acetaminophen (Tylenol), ibuprofen (Advil, Motrin), or naproxen (Aleve). Read and follow all instructions on the label. · Do not take two or more pain medicines at the same time unless the doctor told you to.  Many pain medicines have acetaminophen, which is Tylenol. Too much acetaminophen (Tylenol) can be harmful. When should you call for help? Call your doctor now or seek immediate medical care if:    · You have worse symptoms of infection, such as:  ? Increased pain, swelling, warmth, or redness. ? Red streaks leading from the area. ? Pus draining from the area. ? A fever.    Watch closely for changes in your health, and be sure to contact your doctor if:    · Your lymph nodes do not get smaller or do not return to normal.     · You do not get better as expected. Where can you learn more? Go to http://fiona-pamela.info/. Enter Z778 in the search box to learn more about \"Swollen Lymph Nodes: Care Instructions. \"  Current as of: July 30, 2018  Content Version: 11.9  © 9803-8354 [employer]. Care instructions adapted under license by [employer] (which disclaims liability or warranty for this information). If you have questions about a medical condition or this instruction, always ask your healthcare professional. [place] [address] any warranty or liability for your use of this information.

## 2019-06-18 NOTE — ED NOTES
Discharge instructions were given to the patient by Carolanne Cogan, PA. The patient left the Emergency Department ambulatory, alert and oriented and in no acute distress with 1 prescription. The patient was encouraged to call or return to the ED for worsening issues or problems and was encouraged to schedule a follow up appointment for continuing care. The patient verbalized understanding of discharge instructions and prescriptions, all questions were answered. The patient has no further concerns at this time.

## 2019-06-18 NOTE — ED PROVIDER NOTES
EMERGENCY DEPARTMENT HISTORY AND PHYSICAL EXAM      Date: 6/18/2019  Patient Name: Malick Montes    History of Presenting Illness     Chief Complaint   Patient presents with    Ear Pain       History Provided By: Patient    HPI: Malick Montes, 38 y.o. female with PMHx significant for hypertension, seizures, murmur, asthma, cholecystectomy, tobacco abuse, presents ambulatory to the ED with cc of acute moderate aching left ear pain radiating to left shoulder, left upper back, left eye X 1 week. No medications or modifying factors. Patient endorses a knot behind her left ear which is improved somewhat today. Denies fever, chills, nausea, vomiting, lightheadedness, syncope, chest pain, shortness of breath, dyspnea, numbness, tingling, hearing impairment, drainage. Patient endorses intermittent dizziness. History of allergies and is taking Benadryl. Patient additionally endorses urinary frequency. Denies abdominal pain, genital sore or rash, vaginal discharge, urgency. There are no other complaints, changes, or physical findings at this time. PCP: Arlin Louis NP    No current facility-administered medications on file prior to encounter. Current Outpatient Medications on File Prior to Encounter   Medication Sig Dispense Refill    butalbital-acetaminophen-caff (FIORICET) -40 mg per capsule Take 1 Cap by mouth every four (4) hours as needed for Pain. 10 Cap 0    traZODone (DESYREL) 150 mg tablet Take 150 mg by mouth nightly.  amLODIPine (NORVASC) 5 mg tablet Take 5 mg by mouth two (2) times a day. Past History     Past Medical History:  Past Medical History:   Diagnosis Date    Asthma     Hypertension     Murmur     Seizures ([address]        Past Surgical History:  Past Surgical History:   Procedure Laterality Date    HX CHOLECYSTECTOMY         Family History:  History reviewed. No pertinent family history.     Social History:  Social History     Tobacco Use    Smoking status: Current Every Day Smoker    Smokeless tobacco: Never Used   Substance Use Topics    Alcohol use: Yes    Drug use: No       Allergies: Allergies   Allergen Reactions    Penicillin G Hoarseness         Review of Systems   Review of Systems   Constitutional: Negative for activity change, chills, fatigue and fever. HENT: Positive for ear pain and sneezing. Negative for congestion, dental problem, ear discharge, facial swelling, hearing loss, mouth sores, postnasal drip, rhinorrhea, sinus pressure, sore throat, tinnitus and trouble swallowing. Eyes: Negative for pain, discharge and visual disturbance. Respiratory: Negative for apnea, cough and shortness of breath. Cardiovascular: Negative for chest pain. Gastrointestinal: Negative for abdominal pain, diarrhea, nausea and vomiting. Genitourinary: Positive for frequency. Negative for dysuria, flank pain, hematuria, urgency, vaginal bleeding, vaginal discharge and vaginal pain. Musculoskeletal: Negative for arthralgias and neck pain. Skin: Negative. Negative for rash. Allergic/Immunologic: Positive for environmental allergies. Neurological: Positive for dizziness and headaches. Negative for syncope, facial asymmetry, weakness, light-headedness and numbness. Psychiatric/Behavioral: Negative. Physical Exam   Physical Exam   Constitutional: She is oriented to person, place, and time. She appears well-developed and well-nourished. No distress. HENT:   Head: Normocephalic and atraumatic. Right Ear: Hearing, tympanic membrane, external ear and ear canal normal.   Left Ear: Hearing, tympanic membrane and ear canal normal. There is tenderness. No drainage or swelling. No foreign bodies. No mastoid tenderness. Tympanic membrane is not injected, not erythematous, not retracted and not bulging. No middle ear effusion. No decreased hearing is noted. Nose: Mucosal edema present. No rhinorrhea.  Right sinus exhibits no maxillary sinus tenderness and no frontal sinus tenderness. Left sinus exhibits no maxillary sinus tenderness and no frontal sinus tenderness. Mouth/Throat: Uvula is midline, oropharynx is clear and moist and mucous membranes are normal. Mucous membranes are not dry. No trismus in the jaw. Normal dentition. Dental caries present. No dental abscesses or uvula swelling. No oropharyngeal exudate, posterior oropharyngeal edema, posterior oropharyngeal erythema or tonsillar abscesses. Eyes: Pupils are equal, round, and reactive to light. Conjunctivae and EOM are normal.   Neck: Normal range of motion, full passive range of motion without pain and phonation normal.   Cardiovascular: Normal rate, regular rhythm, normal heart sounds and intact distal pulses. Pulmonary/Chest: Effort normal and breath sounds normal. No respiratory distress. She has no wheezes. She has no rales. Abdominal: Soft. Normal appearance. She exhibits no mass. There is no tenderness. There is no rigidity, no rebound, no guarding, no CVA tenderness, no tenderness at McBurney's point and negative Mir's sign. Musculoskeletal: Normal range of motion. Lymphadenopathy:        Head (right side): No submental, no submandibular, no tonsillar, no preauricular, no posterior auricular and no occipital adenopathy present. Head (left side): Posterior auricular adenopathy present. No submental, no tonsillar, no preauricular and no occipital adenopathy present. She has no cervical adenopathy. Neurological: She is alert and oriented to person, place, and time. She has normal strength. She is not disoriented. No cranial nerve deficit or sensory deficit. Gait normal. GCS eye subscore is 4. GCS verbal subscore is 5. GCS motor subscore is 6. Skin: Skin is warm and dry. She is not diaphoretic. Psychiatric: She has a normal mood and affect. Her behavior is normal. Judgment and thought content normal.   Nursing note and vitals reviewed.       Diagnostic Study Results     Labs -     Recent Results (from the past 12 hour(s))   URINALYSIS W/ REFLEX CULTURE    Collection Time: 06/18/19  6:53 PM   Result Value Ref Range    Color YELLOW/STRAW      Appearance CLEAR CLEAR      Specific gravity <1.005 1.003 - 1.030    pH (UA) 6.5 5.0 - 8.0      Protein NEGATIVE  NEG mg/dL    Glucose NEGATIVE  NEG mg/dL    Ketone NEGATIVE  NEG mg/dL    Bilirubin NEGATIVE  NEG      Blood NEGATIVE  NEG      Urobilinogen 0.2 0.2 - 1.0 EU/dL    Nitrites NEGATIVE  NEG      Leukocyte Esterase NEGATIVE  NEG      WBC 0-4 0 - 4 /hpf    RBC 0-5 0 - 5 /hpf    Epithelial cells FEW FEW /lpf    Bacteria NEGATIVE  NEG /hpf    UA:UC IF INDICATED CULTURE NOT INDICATED BY UA RESULT CNI     HCG URINE, QL. - POC    Collection Time: 06/18/19  6:55 PM   Result Value Ref Range    Pregnancy test,urine (POC) NEGATIVE  NEG         Radiologic Studies -   No orders to display     CT Results  (Last 48 hours)    None        CXR Results  (Last 48 hours)    None            Medical Decision Making   I am the first provider for this patient. I reviewed the vital signs, available nursing notes, past medical history, past surgical history, family history and social history. Vital Signs-Reviewed the patient's vital signs. Patient Vitals for the past 12 hrs:   Temp Pulse Resp BP SpO2   06/18/19 1802 98.5 °F (36.9 °C) (!) 101 19 146/89 99 %       Pulse Oximetry Analysis - 99% on RA    Records Reviewed: Nursing Notes, Old Medical Records, Previous Radiology Studies and Previous Laboratory Studies    Provider Notes (Medical Decision Making):   46-year-old female presents with acute left ear pain X 1 week. Exam consistent with posterior auricular lymphadenopathy. DDx: aom, uri, allergies, oe, cerumen impaction, mastoiditis (no mastoid ttp/ erythema/induration/swelling/crepitus). Pt additionally endorses urinary frequency. Denies dysuria, fever, chills, n/v, hematuria. Will obtain UA.     ED Course:   Initial assessment performed. The patients presenting problems have been discussed, and they are in agreement with the care plan formulated and outlined with them. I have encouraged them to ask questions as they arise throughout their visit. Critical Care Time:   0    Disposition:  7:37 PM  I have discussed with patient their diagnosis, treatment, and follow up plan. The patient agrees to follow up as outlined in discharge paperwork and also to return to the ED with any worsening. Jassi Shah PA-C      PLAN:  1. Current Discharge Medication List      START taking these medications    Details   clindamycin (CLEOCIN) 300 mg capsule Take 1 Cap by mouth four (4) times daily for 7 days. Qty: 28 Cap, Refills: 0         CONTINUE these medications which have NOT CHANGED    Details   butalbital-acetaminophen-caff (FIORICET) -40 mg per capsule Take 1 Cap by mouth every four (4) hours as needed for Pain. Qty: 10 Cap, Refills: 0      traZODone (DESYREL) 150 mg tablet Take 150 mg by mouth nightly. amLODIPine (NORVASC) 5 mg tablet Take 5 mg by mouth two (2) times a day. 2.   Follow-up Information     Follow up With Specialties Details Why Contact Info    Heather Rivers NP Nurse Practitioner Schedule an appointment as soon as possible for a visit in 2 days As needed, If symptoms worsen [address] [phone]  [phone]          Return to ED if worse     Diagnosis     Clinical Impression:   1. Posterior auricular pain of left ear    2. Dental caries        Attestations:    Please note that this dictation was completed with Dragon, computer voice recognition software. Quite often unanticipated grammatical, syntax, homophones, and other interpretive errors are inadvertently transcribed by the computer software. Please disregard these errors. Additionally, please excuse any errors that have escaped final proofreading.

## 2019-06-18 NOTE — ED NOTES
Emergency Department Nursing Plan of Care       The Nursing Plan of Care is developed from the Nursing assessment and Emergency Department Attending provider initial evaluation. The plan of care may be reviewed in the ED Provider note.     The Plan of Care was developed with the following considerations:   Patient / Family readiness to learn indicated by:verbalized understanding  Persons(s) to be included in education: patient  Barriers to Learning/Limitations:No    Signed     Dali Navarro RN    6/18/2019   7:22 PM

## 2019-09-08 ENCOUNTER — HOSPITAL ENCOUNTER (EMERGENCY)
Age: 44
Discharge: HOME | End: 2019-09-08
Attending: EMERGENCY MEDICINE
Payer: MEDICARE

## 2019-09-08 ENCOUNTER — APPOINTMENT (OUTPATIENT)
Dept: GENERAL RADIOLOGY | Age: 44
End: 2019-09-08
Attending: EMERGENCY MEDICINE
Payer: MEDICARE

## 2019-09-08 VITALS
SYSTOLIC BLOOD PRESSURE: 134 MMHG | HEIGHT: 66 IN | WEIGHT: 192 LBS | BODY MASS INDEX: 30.86 KG/M2 | OXYGEN SATURATION: 100 % | HEART RATE: 99 BPM | RESPIRATION RATE: 15 BRPM | DIASTOLIC BLOOD PRESSURE: 81 MMHG | TEMPERATURE: 98.1 F

## 2019-09-08 DIAGNOSIS — F41.9 ANXIETY: ICD-10-CM

## 2019-09-08 DIAGNOSIS — F19.10 DRUG ABUSE (HCC): ICD-10-CM

## 2019-09-08 DIAGNOSIS — R06.00 DYSPNEA, UNSPECIFIED TYPE: Primary | ICD-10-CM

## 2019-09-08 LAB
ANION GAP SERPL CALC-SCNC: 8 MMOL/L (ref 5–15)
BASOPHILS # BLD: 0.1 K/UL (ref 0–0.1)
BASOPHILS NFR BLD: 1 % (ref 0–1)
BUN SERPL-MCNC: 15 MG/DL (ref 6–20)
BUN/CREAT SERPL: 19 (ref 12–20)
CALCIUM SERPL-MCNC: 9.2 MG/DL (ref 8.5–10.1)
CHLORIDE SERPL-SCNC: 101 MMOL/L (ref 97–108)
CO2 SERPL-SCNC: 30 MMOL/L (ref 21–32)
CREAT SERPL-MCNC: 0.8 MG/DL (ref 0.55–1.02)
DIFFERENTIAL METHOD BLD: NORMAL
EOSINOPHIL # BLD: 0.1 K/UL (ref 0–0.4)
EOSINOPHIL NFR BLD: 1 % (ref 0–7)
ERYTHROCYTE [DISTWIDTH] IN BLOOD BY AUTOMATED COUNT: 12.7 % (ref 11.5–14.5)
GLUCOSE SERPL-MCNC: 96 MG/DL (ref 65–100)
HCT VFR BLD AUTO: 39.5 % (ref 35–47)
HGB BLD-MCNC: 13 G/DL (ref 11.5–16)
IMM GRANULOCYTES # BLD AUTO: 0 K/UL (ref 0–0.04)
IMM GRANULOCYTES NFR BLD AUTO: 0 % (ref 0–0.5)
LYMPHOCYTES # BLD: 3.4 K/UL (ref 0.8–3.5)
LYMPHOCYTES NFR BLD: 35 % (ref 12–49)
MCH RBC QN AUTO: 28.8 PG (ref 26–34)
MCHC RBC AUTO-ENTMCNC: 32.9 G/DL (ref 30–36.5)
MCV RBC AUTO: 87.4 FL (ref 80–99)
MONOCYTES # BLD: 0.7 K/UL (ref 0–1)
MONOCYTES NFR BLD: 7 % (ref 5–13)
NEUTS SEG # BLD: 5.5 K/UL (ref 1.8–8)
NEUTS SEG NFR BLD: 56 % (ref 32–75)
NRBC # BLD: 0 K/UL (ref 0–0.01)
NRBC BLD-RTO: 0 PER 100 WBC
PLATELET # BLD AUTO: 311 K/UL (ref 150–400)
PMV BLD AUTO: 9.7 FL (ref 8.9–12.9)
POTASSIUM SERPL-SCNC: 3.5 MMOL/L (ref 3.5–5.1)
RBC # BLD AUTO: 4.52 M/UL (ref 3.8–5.2)
SODIUM SERPL-SCNC: 139 MMOL/L (ref 136–145)
WBC # BLD AUTO: 9.9 K/UL (ref 3.6–11)

## 2019-09-08 PROCEDURE — 36415 COLL VENOUS BLD VENIPUNCTURE: CPT

## 2019-09-08 PROCEDURE — 74011250637 HC RX REV CODE- 250/637: Performed by: EMERGENCY MEDICINE

## 2019-09-08 PROCEDURE — 77030029684 HC NEB SM VOL KT MONA -A

## 2019-09-08 PROCEDURE — 93005 ELECTROCARDIOGRAM TRACING: CPT

## 2019-09-08 PROCEDURE — 94640 AIRWAY INHALATION TREATMENT: CPT

## 2019-09-08 PROCEDURE — 71046 X-RAY EXAM CHEST 2 VIEWS: CPT

## 2019-09-08 PROCEDURE — 85025 COMPLETE CBC W/AUTO DIFF WBC: CPT

## 2019-09-08 PROCEDURE — 74011000250 HC RX REV CODE- 250: Performed by: EMERGENCY MEDICINE

## 2019-09-08 PROCEDURE — 80048 BASIC METABOLIC PNL TOTAL CA: CPT

## 2019-09-08 PROCEDURE — 99285 EMERGENCY DEPT VISIT HI MDM: CPT

## 2019-09-08 RX ORDER — IPRATROPIUM BROMIDE AND ALBUTEROL SULFATE 2.5; .5 MG/3ML; MG/3ML
3 SOLUTION RESPIRATORY (INHALATION) ONCE
Status: COMPLETED | OUTPATIENT
Start: 2019-09-08 | End: 2019-09-08

## 2019-09-08 RX ORDER — LORAZEPAM 1 MG/1
0.5 TABLET ORAL
Status: COMPLETED | OUTPATIENT
Start: 2019-09-08 | End: 2019-09-08

## 2019-09-08 RX ORDER — BUTALBITAL, ACETAMINOPHEN AND CAFFEINE 50; 325; 40 MG/1; MG/1; MG/1
1 TABLET ORAL
Status: COMPLETED | OUTPATIENT
Start: 2019-09-08 | End: 2019-09-08

## 2019-09-08 RX ADMIN — BUTALBITAL, ACETAMINOPHEN, AND CAFFEINE 1 TABLET: 50; 325; 40 TABLET, COATED ORAL at 03:59

## 2019-09-08 RX ADMIN — LORAZEPAM 0.5 MG: 1 TABLET ORAL at 03:16

## 2019-09-08 RX ADMIN — IPRATROPIUM BROMIDE AND ALBUTEROL SULFATE 3 ML: .5; 3 SOLUTION RESPIRATORY (INHALATION) at 03:05

## 2019-09-08 NOTE — ED TRIAGE NOTES
Brought by EMS. Woke up c/o heart skipping beats and hyperventilation. Reports drinking Hennesy and snorting heroin.

## 2019-09-08 NOTE — DISCHARGE INSTRUCTIONS
Patient Education        Shortness of Breath: Care Instructions  Your Care Instructions  Shortness of breath has many causes. Sometimes conditions such as anxiety can lead to shortness of breath. Some people get mild shortness of breath when they exercise. Trouble breathing also can be a symptom of a serious problem, such as asthma, lung disease, emphysema, heart problems, and pneumonia. If your shortness of breath continues, you may need tests and treatment. Watch for any changes in your breathing and other symptoms. Follow-up care is a key part of your treatment and safety. Be sure to make and go to all appointments, and call your doctor if you are having problems. It's also a good idea to know your test results and keep a list of the medicines you take. How can you care for yourself at home? · Do not smoke or allow others to smoke around you. If you need help quitting, talk to your doctor about stop-smoking programs and medicines. These can increase your chances of quitting for good. · Get plenty of rest and sleep. · Take your medicines exactly as prescribed. Call your doctor if you think you are having a problem with your medicine. · Find healthy ways to deal with stress. ? Exercise daily. ? Get plenty of sleep. ? Eat regularly and well. When should you call for help? Call 911 anytime you think you may need emergency care. For example, call if:    · You have severe shortness of breath.     · You have symptoms of a heart attack. These may include:  ? Chest pain or pressure, or a strange feeling in the chest.  ? Sweating. ? Shortness of breath. ? Nausea or vomiting. ? Pain, pressure, or a strange feeling in the back, neck, jaw, or upper belly or in one or both shoulders or arms. ? Lightheadedness or sudden weakness. ? A fast or irregular heartbeat. After you call 911, the  may tell you to chew 1 adult-strength or 2 to 4 low-dose aspirin. Wait for an ambulance.  Do not try to drive yourself.    Call your doctor now or seek immediate medical care if:    · Your shortness of breath gets worse or you start to wheeze. Wheezing is a high-pitched sound when you breathe.     · You wake up at night out of breath or have to prop your head up on several pillows to breathe.     · You are short of breath after only light activity or while at rest.    Watch closely for changes in your health, and be sure to contact your doctor if:    · You do not get better over the next 1 to 2 days. Where can you learn more? Go to http://fiona-pamela.info/. Enter S780 in the search box to learn more about \"Shortness of Breath: Care Instructions. \"  Current as of: September 5, 2018  Content Version: 12.1  © 2697-9752 [employer]. Care instructions adapted under license by [employer] (which disclaims liability or warranty for this information). If you have questions about a medical condition or this instruction, always ask your healthcare professional. [address] any warranty or liability for your use of this information. Patient Education        Substance Use Disorder: Care Instructions  Overview    You can improve your life and health by stopping your use of alcohol or drugs. When you don't drink or use drugs, you may feel and sleep better. You may get along better with your family, friends, and coworkers. There are medicines and programs that can help with substance use disorder. How can you care for yourself at home? · If you have been given medicine to help keep you sober or reduce your cravings, be sure to take it as prescribed. · Talk to your doctor about programs that can help you stop using drugs or drinking alcohol. · If your doctor prescribes disulfiram (Antabuse), do not drink any alcohol while you are taking this medicine. You may have severe, even life-threatening, side effects from even small amounts of alcohol.   · Do not tempt yourself by keeping alcohol or drugs in your home. · Learn how to say no when other people drink or use drugs. Or don't spend time with people who drink or use drugs. · Use the time and money spent on drinking or drugs to do something fun with your family or friends. Preventing a relapse  · Do not drink alcohol or use drugs at all. Using any amount of alcohol or drugs greatly increases your risk for relapse. · Seek help from organizations such as Alcoholics Anonymous, Narcotics Anonymous, or treatment facilities if you feel the need to drink alcohol or use drugs again. · Remember that recovery is a lifelong process. · Stay away from situations, friends, or places that may lead you to drink or use drugs. · Have a plan to spot and deal with relapse. Learn to recognize changes in your thinking that lead you to drink or use drugs. These are warning signs. Get help before you start to drink or use drugs again. · Get help as soon as you can if you relapse. Some people make a plan with another person that outlines what they want that person to do for them if they relapse. The plan usually includes how to handle the relapse and who to notify in case of relapse. · Don't give up. Remember that a relapse does not mean that you have failed. Use the experience to learn the triggers that lead you to drink or use drugs. Then quit again. Many people have several relapses before they are able to quit for good. Follow-up care is a key part of your treatment and safety. Be sure to make and go to all appointments, and call your doctor if you are having problems. It's also a good idea to know your test results and keep a list of the medicines you take. When should you call for help? Call 911 anytime you think you may need emergency care.  For example, call if:    · You feel you cannot stop from hurting yourself or someone else.   [facility] your doctor now or seek immediate medical care if:    · You have serious withdrawal symptoms, such as confusion, hallucinations, severe trembling, or seizures.    Watch closely for changes in your health, and be sure to contact your doctor if:    · You have a relapse.     · You need more help or support to stop. Where can you learn more? Go to http://fiona-pamela.info/. Enter [id number] in the search box to learn more about \"Substance Use Disorder: Care Instructions. \"  Current as of: February 5, 2019  Content Version: 12.1  © 8034-9934 [employer]. Care instructions adapted under license by [employer] (which disclaims liability or warranty for this information). If you have questions about a medical condition or this instruction, always ask your healthcare professional. [address] any warranty or liability for your use of this information.

## 2019-09-08 NOTE — ED NOTES
Pt presents to ED via EMS reporting \"I can't breathe. \" Pt noted to be making strange guttural sounds and gasping for air. Noted that pt O2 maintains at 100%. Pt also reports snorting heroin earlier tonight. Pt is alert and oriented x 4, RR even and unlabored, skin is warm and dry. Assessment completed and pt updated on plan of care. Emergency Department Nursing Plan of Care       The Nursing Plan of Care is developed from the Nursing assessment and Emergency Department Attending provider initial evaluation. The plan of care may be reviewed in the ED Provider note.     The Plan of Care was developed with the following considerations:   Patient / Family readiness to learn indicated by:verbalized understanding  Persons(s) to be included in education: patient  Barriers to Learning/Limitations:No    Signed     Greg Norton RN    9/8/2019   4:08 AM

## 2019-09-08 NOTE — ED PROVIDER NOTES
EMERGENCY DEPARTMENT HISTORY AND PHYSICAL EXAM      Date: 9/8/2019  Patient Name: Shiva Price    History of Presenting Illness     Chief Complaint   Patient presents with    Shortness of Breath       History Provided By: Patient    HPI: Shiva Price, 38 y.o. female with PMHx as noted below presents the emergency department with complaints of shortness of breath. Patient states that she was at a party earlier this evening when she was using heroin and drinking alcohol. When she got home she was laying in bed when she states she felt her heart racing and skipping beats and became very short of breath. Patient notes symptoms have been constant since onset without specific relieving or exacerbating factors. Patient does deny any chest pain, cough, wheezing, recent illness, lower extremity pain/swelling, recent long immobilization, history of venous thromboembolism. PCP: Kianna Jeter NP    Current Outpatient Medications   Medication Sig Dispense Refill    butalbital-acetaminophen-caff (FIORICET) -40 mg per capsule Take 1 Cap by mouth every four (4) hours as needed for Pain. 10 Cap 0    traZODone (DESYREL) 150 mg tablet Take 150 mg by mouth nightly.  amLODIPine (NORVASC) 5 mg tablet Take 5 mg by mouth two (2) times a day. Past History     Past Medical History:  Past Medical History:   Diagnosis Date    Asthma     Hypertension     Murmur     Seizures ([address]        Past Surgical History:  Past Surgical History:   Procedure Laterality Date    HX CHOLECYSTECTOMY         Family History:  No family history on file. Social History:  Social History     Tobacco Use    Smoking status: Current Every Day Smoker    Smokeless tobacco: Never Used   Substance Use Topics    Alcohol use: Yes    Drug use: No       Allergies: Allergies   Allergen Reactions    Penicillin G Hoarseness         Review of Systems   Review of Systems  Constitutional: Negative for fever, chills, and fatigue. HENT: Negative for congestion, sore throat, rhinorrhea, sneezing and neck stiffness   Eyes: Negative for discharge and redness. Respiratory: Positive for  shortness of breath, wheezing   Cardiovascular: Negative for chest pain, palpitations   Gastrointestinal: Negative for nausea, vomiting, abdominal pain, constipation, diarrhea and blood in stool. Genitourinary: Negative for dysuria, hematuria, flank pain, decreased urine volume, discharge,   Musculoskeletal: Negative for myalgias or joint pain . Skin: Negative for rash or lesions . Neurological: Negative weakness, light-headedness, numbness and headaches. Physical Exam   Physical Exam    GENERAL: alert and oriented, no acute distress  EYES: PEERL, No injection, discharge or icterus. ENT: Mucous membranes pink and moist.  NECK: Supple  LUNGS: Airway patent. Non-labored respirations. Breath sounds clear with good air entry bilaterally. HEART: Regular rate and rhythm. No peripheral edema  ABDOMEN: Non-distended and non-tender, without guarding or rebound. SKIN:  warm, dry  MSK/EXTREMITIES: Without swelling, tenderness or deformity, symmetric with normal ROM  NEUROLOGICAL: Alert, oriented      Diagnostic Study Results     Labs -   No results found for this or any previous visit (from the past 12 hour(s)). Radiologic Studies -   XR CHEST PA LAT   Final Result   IMPRESSION: No acute cardiopulmonary disease. CT Results  (Last 48 hours)    None        CXR Results  (Last 48 hours)               09/08/19 0330  XR CHEST PA LAT Final result    Impression:  IMPRESSION: No acute cardiopulmonary disease. Narrative: Indication:  SOB        Exam: PA and lateral views of the chest.       Direct comparison is made to prior CXR dated 1/2019. Findings: Cardiomediastinal silhouette is within normal limits. Lungs are clear   bilaterally. Pleural spaces are normal. Osseous structures are intact.                    Medical Decision Making I am the first provider for this patient. I reviewed the vital signs, available nursing notes, past medical history, past surgical history, family history and social history. Vital Signs-Reviewed the patient's vital signs. Records Reviewed: Nursing Notes and Old Medical Records    Provider Notes (Medical Decision Making): On presentation, the patient is well appearing, in no acute distress with normal vital signs (not tachycardic on my evaluation). Based on my history and exam the differential diagnosis for this patient includes panic attack, pulmonary embolism, substance use, asthma exacerbation. Patient symptoms seem to come and go intermittently in the emergency department and she did seem to be forcing a type of wheezing noise that she seemed to have control over. I did give her a DuoNeb as well as a dose of Ativan which seemed to resolve her symptoms. On reevaluation she was resting comfortably and had no further episodes so feel she is safe for discharge. I do favor panic attack over these other diagnoses given her presentation so will not prescribe steroids. ED Course:   Initial assessment performed. The patients presenting problems have been discussed, and they are in agreement with the care plan formulated and outlined with them. I have encouraged them to ask questions as they arise throughout their visit. Medications   LORazepam (ATIVAN) tablet 0.5 mg (0.5 mg Oral Given 9/8/19 0316)   albuterol-ipratropium (DUO-NEB) 2.5 MG-0.5 MG/3 ML (3 mL Nebulization Given 9/8/19 0305)   butalbital-acetaminophen-caffeine (FIORICET, ESGIC) -40 mg per tablet 1 Tab (1 Tab Oral Given 9/8/19 0359)         PROGRESS NOTE:  The patient has been re-evaluated and is ready for discharge. Reviewed available results with patient and have counseled them on diagnosis and care plan. They have expressed understanding, and all their questions have been answered.  They agree with plan and agree to have pt F/U as recommended, or return to the ED if their sxs worsen. Discharge instructions have been provided and explained to them, along with reasons to have pt return to the ED. The patient is amenable to discharge so will discharge pt at this time    Quoc Jean MD        Disposition:  home    PLAN:  1. Discharge Medication List as of 9/8/2019  4:56 AM        2. Follow-up Information     Follow up With Specialties Details Why Contact Info    Stevan Sanchez NP Nurse Practitioner Schedule an appointment as soon as possible for a visit in 2 days  [phone]  [phone]      [facility] EMERGENCY DEPT Emergency Medicine  If symptoms worsen [facility]  [phone]        Return to ED if worse     Diagnosis     Clinical Impression:   1. Dyspnea, unspecified type    2. Drug abuse ([address]    3.  Anxiety

## 2019-09-08 NOTE — ED NOTES
Discharge instructions were given to the patient by Brijesh Fisher RN. The patient left the Emergency Department ambulatory, alert and oriented and in no acute distress with 0 prescriptions. The patient was encouraged to call or return to the ED for worsening issues or problems and was encouraged to schedule a follow up appointment for continuing care. Patient has been instructed that they have been given ativan* which contains opioids, benzodiazepines, or other sedating drugs. Patient is aware that they  will need to refrain from driving or operating heavy machinery after taking this medication. Patient also instructed that they need to avoid drinking alcohol and using other products containing opioids, benzodiazepines, or other sedating drugs. Patient verbalized understanding. The patient verbalized understanding of discharge instructions and prescriptions, all questions were answered. The patient has no further concerns at this time.

## 2019-09-09 LAB
ATRIAL RATE: 97 BPM
CALCULATED P AXIS, ECG09: 45 DEGREES
CALCULATED R AXIS, ECG10: -14 DEGREES
CALCULATED T AXIS, ECG11: 38 DEGREES
DIAGNOSIS, 93000: NORMAL
P-R INTERVAL, ECG05: 200 MS
Q-T INTERVAL, ECG07: 352 MS
QRS DURATION, ECG06: 84 MS
QTC CALCULATION (BEZET), ECG08: 447 MS
VENTRICULAR RATE, ECG03: 97 BPM

## 2020-02-14 ENCOUNTER — HOSPITAL ENCOUNTER (EMERGENCY)
Age: 45
Discharge: HOME | End: 2020-02-14
Attending: EMERGENCY MEDICINE
Payer: MEDICARE

## 2020-02-14 VITALS
HEIGHT: 66 IN | DIASTOLIC BLOOD PRESSURE: 85 MMHG | OXYGEN SATURATION: 100 % | RESPIRATION RATE: 18 BRPM | HEART RATE: 75 BPM | WEIGHT: 192.9 LBS | BODY MASS INDEX: 31 KG/M2 | SYSTOLIC BLOOD PRESSURE: 154 MMHG | TEMPERATURE: 99 F

## 2020-02-14 DIAGNOSIS — F41.1 ANXIETY STATE: ICD-10-CM

## 2020-02-14 DIAGNOSIS — R50.9 TEMPERATURE ELEVATION: ICD-10-CM

## 2020-02-14 DIAGNOSIS — B34.9 VIRAL SYNDROME: Primary | ICD-10-CM

## 2020-02-14 DIAGNOSIS — R22.9 SOFT TISSUE SWELLING: ICD-10-CM

## 2020-02-14 PROCEDURE — 99284 EMERGENCY DEPT VISIT MOD MDM: CPT

## 2020-02-14 PROCEDURE — 74011250637 HC RX REV CODE- 250/637: Performed by: EMERGENCY MEDICINE

## 2020-02-14 RX ORDER — DIPHENHYDRAMINE HCL 25 MG
25 CAPSULE ORAL
Status: COMPLETED | OUTPATIENT
Start: 2020-02-14 | End: 2020-02-14

## 2020-02-14 RX ORDER — CEPHALEXIN 500 MG/1
500 CAPSULE ORAL 3 TIMES DAILY
Qty: 15 CAP | Refills: 0 | Status: SHIPPED | OUTPATIENT
Start: 2020-02-14

## 2020-02-14 RX ORDER — IBUPROFEN 400 MG/1
800 TABLET ORAL
Status: COMPLETED | OUTPATIENT
Start: 2020-02-14 | End: 2020-02-14

## 2020-02-14 RX ADMIN — DIPHENHYDRAMINE HYDROCHLORIDE 25 MG: 25 CAPSULE ORAL at 02:49

## 2020-02-14 RX ADMIN — IBUPROFEN 800 MG: 400 TABLET ORAL at 02:49

## 2020-02-14 NOTE — ED NOTES
Discharge instructions were given to the patient by Claudette Rhea, RN. The patient left the Emergency Department ambulatory, alert and oriented and in no acute distress with 1 prescriptions. The patient was encouraged to call or return to the ED for worsening issues or problems and was encouraged to schedule a follow up appointment for continuing care. The patient verbalized understanding of discharge instructions and prescriptions, all questions were answered. The patient has no further concerns at this time.  setting up [employer].

## 2020-02-14 NOTE — DISCHARGE INSTRUCTIONS
Patient Education        ALSO TAKE OVER-THE-COUNTER BENADRYL AS NEEDED FOR SOFT TISSUE SWELLING. Viral Infections: Care Instructions  Your Care Instructions    You don't feel well, but it's not clear what's causing it. You may have a viral infection. Viruses cause many illnesses, such as the common cold, influenza, fever, rashes, and the diarrhea, nausea, and vomiting that are often called \"stomach flu. \" You may wonder if antibiotic medicines could make you feel better. But antibiotics only treat infections caused by bacteria. They don't work on viruses. The good news is that viral infections usually aren't serious. Most will go away in a few days without medical treatment. In the meantime, there are a few things you can do to make yourself more comfortable. Follow-up care is a key part of your treatment and safety. Be sure to make and go to all appointments, and call your doctor if you are having problems. It's also a good idea to know your test results and keep a list of the medicines you take. How can you care for yourself at home? · Get plenty of rest if you feel tired. · Take an over-the-counter pain medicine if needed, such as acetaminophen (Tylenol), ibuprofen (Advil, Motrin), or naproxen (Aleve). Read and follow all instructions on the label. · Be careful when taking over-the-counter cold or flu medicines and Tylenol at the same time. Many of these medicines have acetaminophen, which is Tylenol. Read the labels to make sure that you are not taking more than the recommended dose. Too much acetaminophen (Tylenol) can be harmful. · Drink plenty of fluids, enough so that your urine is light yellow or clear like water. If you have kidney, heart, or liver disease and have to limit fluids, talk with your doctor before you increase the amount of fluids you drink. · Stay home from work, school, and other public places while you have a fever. When should you call for help?   Call 911 anytime you think you may need emergency care. For example, call if:    · You have severe trouble breathing.     · You passed out (lost consciousness).    Call your doctor now or seek immediate medical care if:    · You seem to be getting much sicker.     · You have a new or higher fever.     · You have blood in your stools.     · You have new belly pain, or your pain gets worse.     · You have a new rash.    Watch closely for changes in your health, and be sure to contact your doctor if:    · You start to get better and then get worse.     · You do not get better as expected. Where can you learn more? Go to http://fiona-pamela.info/. Enter D619 in the search box to learn more about \"Viral Infections: Care Instructions. \"  Current as of: June 9, 2019  Content Version: 12.2  © 0207-1606 [employer]. Care instructions adapted under license by [employer] (which disclaims liability or warranty for this information). If you have questions about a medical condition or this instruction, always ask your healthcare professional. [place] [address] any warranty or liability for your use of this information. Patient Education        Cellulitis: Care Instructions  Your Care Instructions    Cellulitis is a skin infection caused by bacteria, most often strep or staph. It often occurs after a break in the skin from a scrape, cut, bite, or puncture, or after a rash. Cellulitis may be treated without doing tests to find out what caused it. But your doctor may do tests, if needed, to look for a specific bacteria, like methicillin-resistant Staphylococcus aureus (MRSA). The doctor has checked you carefully, but problems can develop later. If you notice any problems or new symptoms, get medical treatment right away. Follow-up care is a key part of your treatment and safety. Be sure to make and go to all appointments, and call your doctor if you are having problems.  It's also a good idea to know your test results and keep a list of the medicines you take. How can you care for yourself at home? · Take your antibiotics as directed. Do not stop taking them just because you feel better. You need to take the full course of antibiotics. · Prop up the infected area on pillows to reduce pain and swelling. Try to keep the area above the level of your heart as often as you can. · If your doctor told you how to care for your wound, follow your doctor's instructions. If you did not get instructions, follow this general advice:  ? Wash the wound with clean water 2 times a day. Don't use hydrogen peroxide or alcohol, which can slow healing. ? You may cover the wound with a thin layer of petroleum jelly, such as Vaseline, and a nonstick bandage. ? Apply more petroleum jelly and replace the bandage as needed. · Be safe with medicines. Take pain medicines exactly as directed. ? If the doctor gave you a prescription medicine for pain, take it as prescribed. ? If you are not taking a prescription pain medicine, ask your doctor if you can take an over-the-counter medicine. To prevent cellulitis in the future  · Try to prevent cuts, scrapes, or other injuries to your skin. Cellulitis most often occurs where there is a break in the skin. · If you get a scrape, cut, mild burn, or bite, wash the wound with clean water as soon as you can to help avoid infection. Don't use hydrogen peroxide or alcohol, which can slow healing. · If you have swelling in your legs (edema), support stockings and good skin care may help prevent leg sores and cellulitis. · Take care of your feet, especially if you have diabetes or other conditions that increase the risk of infection. Wear shoes and socks. Do not go barefoot. If you have athlete's foot or other skin problems on your feet, talk to your doctor about how to treat them. When should you call for help?   Call your doctor now or seek immediate medical care if:    · You have signs that your infection is getting worse, such as:  ? Increased pain, swelling, warmth, or redness. ? Red streaks leading from the area. ? Pus draining from the area. ? A fever.     · You get a rash.    Watch closely for changes in your health, and be sure to contact your doctor if:    · You do not get better as expected. Where can you learn more? Go to http://fiona-pamela.info/. Azul Andino in the search box to learn more about \"Cellulitis: Care Instructions. \"  Current as of: April 1, 2019  Content Version: 12.2  © 1451-9622 [employer]. Care instructions adapted under license by [employer] (which disclaims liability or warranty for this information). If you have questions about a medical condition or this instruction, always ask your healthcare professional. [address] any warranty or liability for your use of this information.

## 2020-02-14 NOTE — ED TRIAGE NOTES
Pt comes in reporting R ear pain, diarrhea, muscle aches x 3 days. Pt says she woke up 1 hour PTA reporting feeling hot. Pt took mortin around 2200 last night. Pt has swelling around R ear on R temple.

## 2020-02-14 NOTE — ED NOTES
Patient has been instructed that they have been given benadryl which contains opioids, benzodiazepines, or other sedating drugs. Patient is aware that they  will need to refrain from driving or operating heavy machinery after taking this medication. Patient also instructed that they need to avoid drinking alcohol and using other products containing opioids, benzodiazepines, or other sedating drugs. Patient verbalized understanding.       Pt's gf is giving her a ride home

## 2020-02-14 NOTE — ED NOTES
Pt presents ambulatory to ED reporting R ear pain, diarrhea, muscle aches x 3 days. Pt says she woke up 1 hour PTA reporting feeling hot. Pt took mortin around 2200 last night. Pt has swelling around R ear on R temple. Pt is alert and oriented x 4, RR even and unlabored, skin is warm and dry. Assesment completed and pt updated on plan of care. Emergency Department Nursing Plan of Care       The Nursing Plan of Care is developed from the Nursing assessment and Emergency Department Attending provider initial evaluation. The plan of care may be reviewed in the ED Provider note.     The Plan of Care was developed with the following considerations:   Patient / Family readiness to learn indicated by:verbalized understanding  Persons(s) to be included in education: patient  Barriers to Learning/Limitations:No    Signed     Syed Milner RN    2/14/2020   2:46 AM

## 2020-02-14 NOTE — ED PROVIDER NOTES
43-year-old female presents after awakening from sleep 20 minutes ago feeling very hot. Temperature on arrival is 99. She states that after she awoke feeling hot, her friend looked at her and noticed localized swelling above her right ear, and they both assumed she must have gotten bit. The patient states that she went to sleep around 10 PM in her usual state of health. Then on further questioning she states that she did have a right earache before bed. Is currently feeling mildly lightheaded. Denies body ache, dental pain, sneeze. Does have mild congestion and rhinorrhea on review of systems. Denies history of diabetes. Cold Symptoms    Associated symptoms include congestion, ear pain and rhinorrhea. Pertinent negatives include no chest pain, no abdominal pain, no diarrhea, no nausea, no dysuria, no headaches and no cough. Past Medical History:   Diagnosis Date    Asthma     Hypertension     Murmur     Seizures ([facility] [address]        Past Surgical History:   Procedure Laterality Date    HX CHOLECYSTECTOMY           History reviewed. No pertinent family history. Social History     Socioeconomic History    Marital status: SINGLE     Spouse name: Not on file    Number of children: Not on file    Years of education: Not on file    Highest education level: Not on file   Occupational History    Not on file   Social Needs    Financial resource strain: Not on file    Food insecurity:     Worry: Not on file     Inability: Not on file    Transportation needs:     Medical: Not on file     Non-medical: Not on file   Tobacco Use    Smoking status: Current Every Day Smoker    Smokeless tobacco: Never Used   Substance and Sexual Activity    Alcohol use:  Yes    Drug use: No    Sexual activity: Not on file   Lifestyle    Physical activity:     Days per week: Not on file     Minutes per session: Not on file    Stress: Not on file   Relationships    Social connections:     Talks on phone: Not on file     Gets together: Not on file     Attends Latter-day service: Not on file     Active member of club or organization: Not on file     Attends meetings of clubs or organizations: Not on file     Relationship status: Not on file    Intimate partner violence:     Fear of current or ex partner: Not on file     Emotionally abused: Not on file     Physically abused: Not on file     Forced sexual activity: Not on file   Other Topics Concern    Not on file   Social History Narrative    Not on file         ALLERGIES: Penicillin g    Review of Systems   Constitutional: Positive for fever. Negative for chills and unexpected weight change. HENT: Positive for congestion, ear pain, facial swelling and rhinorrhea. Negative for trouble swallowing. Eyes: Negative for discharge. Respiratory: Negative. Negative for cough, chest tightness and shortness of breath. Cardiovascular: Negative. Negative for chest pain. Gastrointestinal: Negative. Negative for abdominal distention, abdominal pain, constipation, diarrhea and nausea. Endocrine: Negative. Genitourinary: Negative. Negative for difficulty urinating, dysuria, frequency and urgency. Musculoskeletal: Negative. Negative for arthralgias and myalgias. Skin: Negative. Negative for color change. Allergic/Immunologic: Negative. Neurological: Negative. Negative for dizziness, speech difficulty and headaches. Hematological: Negative. Psychiatric/Behavioral: Negative. Negative for agitation and confusion. All other systems reviewed and are negative. Vitals:    02/14/20 0225   BP: 154/85   Pulse: 75   Resp: 18   Temp: 99 °F (37.2 °C)   SpO2: 100%   Weight: 87.5 kg (192 lb 14.4 oz)   Height: 5' 6\" (1.676 m)            Physical Exam  Vitals signs reviewed. Constitutional:       Appearance: She is well-developed. HENT:      Head: Normocephalic and atraumatic. Comments: Localized area of edema and erythema.   It is not significantly tender. There is no fluctuance. There is no pointing. There is no obvious bite es. No pre-or post auricular lymphadenopathy. No mastoid tenderness. Miller Freddy is not protruding forward. No parotid gland tenderness or swelling. No jaw or TMJ tenderness. Eyes:      Conjunctiva/sclera: Conjunctivae normal.   Neck:      Musculoskeletal: Neck supple. Cardiovascular:      Rate and Rhythm: Normal rate and regular rhythm. Pulmonary:      Effort: Pulmonary effort is normal. No respiratory distress. Abdominal:      Palpations: Abdomen is soft. Tenderness: There is no abdominal tenderness. Musculoskeletal: Normal range of motion. General: No deformity. Skin:     General: Skin is warm and dry. Neurological:      Mental Status: She is alert and oriented to person, place, and time. Psychiatric:         Behavior: Behavior normal.         Thought Content: Thought content normal.          MDM  Number of Diagnoses or Management Options  Diagnosis management comments: Per RN, patient told her she had 3 days of body aches and also had diarrhea and right ear pain before bed. These are conflicting stories. She does tell me she is schizophrenia and gets anxious. Likely this is either a viral syndrome or early influenza. Skin changes above her right ear are suspicious for local inflammatory reaction, possibly from a bite. Given that this coincided with her subjective fever, will cover for cellulitis. [facility] blood pressure and tells me she already has a blood pressure prescription to  waiting for her at her pharmacy. Procedures               LABORATORY TESTS:  No results found for this or any previous visit (from the past 12 hour(s)).     IMAGING RESULTS:  No orders to display       MEDICATIONS GIVEN:  Medications   diphenhydrAMINE (BENADRYL) capsule 25 mg (25 mg Oral Given 2/14/20 0249)   ibuprofen (MOTRIN) tablet 800 mg (800 mg Oral Given 2/14/20 0249)       IMPRESSION:  1. Viral syndrome    2. Soft tissue swelling    3. Temperature elevation    4. Anxiety state        PLAN:  1. Discharge Medication List as of 2/14/2020  2:55 AM      START taking these medications    Details   cephALEXin (KEFLEX) 500 mg capsule Take 1 Cap by mouth three (3) times daily. , Print, Disp-15 Cap, R-0         CONTINUE these medications which have NOT CHANGED    Details   traZODone (DESYREL) 150 mg tablet Take 150 mg by mouth nightly., Historical Med      amLODIPine (NORVASC) 5 mg tablet Take 5 mg by mouth two (2) times a day., Historical Med           2.    Follow-up Information    None       Return to ED if worse

## 2021-09-14 ENCOUNTER — APPOINTMENT (OUTPATIENT)
Dept: GENERAL RADIOLOGY | Age: 46
End: 2021-09-14
Attending: EMERGENCY MEDICINE
Payer: MEDICARE

## 2021-09-14 ENCOUNTER — APPOINTMENT (OUTPATIENT)
Dept: CT IMAGING | Age: 46
End: 2021-09-14
Attending: EMERGENCY MEDICINE
Payer: MEDICARE

## 2021-09-14 ENCOUNTER — HOSPITAL ENCOUNTER (EMERGENCY)
Age: 46
Discharge: HOME | End: 2021-09-14
Attending: EMERGENCY MEDICINE
Payer: MEDICARE

## 2021-09-14 VITALS
TEMPERATURE: 98.1 F | HEART RATE: 113 BPM | BODY MASS INDEX: 29.3 KG/M2 | DIASTOLIC BLOOD PRESSURE: 105 MMHG | SYSTOLIC BLOOD PRESSURE: 179 MMHG | RESPIRATION RATE: 22 BRPM | WEIGHT: 182.32 LBS | OXYGEN SATURATION: 100 % | HEIGHT: 66 IN

## 2021-09-14 DIAGNOSIS — G44.209 TENSION HEADACHE: ICD-10-CM

## 2021-09-14 DIAGNOSIS — I10 ESSENTIAL HYPERTENSION: ICD-10-CM

## 2021-09-14 DIAGNOSIS — R10.9 ACUTE ABDOMINAL PAIN: Primary | ICD-10-CM

## 2021-09-14 DIAGNOSIS — R07.9 ACUTE CHEST PAIN: ICD-10-CM

## 2021-09-14 LAB
ALBUMIN SERPL-MCNC: 4.2 G/DL (ref 3.5–5)
ALBUMIN/GLOB SERPL: 1 {RATIO} (ref 1.1–2.2)
ALP SERPL-CCNC: 90 U/L (ref 45–117)
ALT SERPL-CCNC: 34 U/L (ref 12–78)
ANION GAP SERPL CALC-SCNC: 8 MMOL/L (ref 5–15)
AST SERPL-CCNC: 37 U/L (ref 15–37)
BASOPHILS # BLD: 0.1 K/UL (ref 0–0.1)
BASOPHILS NFR BLD: 1 % (ref 0–1)
BILIRUB SERPL-MCNC: 0.4 MG/DL (ref 0.2–1)
BUN SERPL-MCNC: 8 MG/DL (ref 6–20)
BUN/CREAT SERPL: 11 (ref 12–20)
CALCIUM SERPL-MCNC: 9.4 MG/DL (ref 8.5–10.1)
CHLORIDE SERPL-SCNC: 101 MMOL/L (ref 97–108)
CO2 SERPL-SCNC: 27 MMOL/L (ref 21–32)
CREAT SERPL-MCNC: 0.74 MG/DL (ref 0.55–1.02)
DIFFERENTIAL METHOD BLD: NORMAL
EOSINOPHIL # BLD: 0.3 K/UL (ref 0–0.4)
EOSINOPHIL NFR BLD: 3 % (ref 0–7)
ERYTHROCYTE [DISTWIDTH] IN BLOOD BY AUTOMATED COUNT: 12.1 % (ref 11.5–14.5)
GLOBULIN SER CALC-MCNC: 4.3 G/DL (ref 2–4)
GLUCOSE SERPL-MCNC: 120 MG/DL (ref 65–100)
HCT VFR BLD AUTO: 38.1 % (ref 35–47)
HGB BLD-MCNC: 12.5 G/DL (ref 11.5–16)
IMM GRANULOCYTES # BLD AUTO: 0 K/UL (ref 0–0.04)
IMM GRANULOCYTES NFR BLD AUTO: 0 % (ref 0–0.5)
LIPASE SERPL-CCNC: 43 U/L (ref 73–393)
LYMPHOCYTES # BLD: 1.5 K/UL (ref 0.8–3.5)
LYMPHOCYTES NFR BLD: 15 % (ref 12–49)
MCH RBC QN AUTO: 30.9 PG (ref 26–34)
MCHC RBC AUTO-ENTMCNC: 32.8 G/DL (ref 30–36.5)
MCV RBC AUTO: 94.1 FL (ref 80–99)
MONOCYTES # BLD: 0.6 K/UL (ref 0–1)
MONOCYTES NFR BLD: 6 % (ref 5–13)
NEUTS SEG # BLD: 7.5 K/UL (ref 1.8–8)
NEUTS SEG NFR BLD: 75 % (ref 32–75)
NRBC # BLD: 0 K/UL (ref 0–0.01)
NRBC BLD-RTO: 0 PER 100 WBC
PLATELET # BLD AUTO: 326 K/UL (ref 150–400)
PMV BLD AUTO: 10.2 FL (ref 8.9–12.9)
POTASSIUM SERPL-SCNC: 3.3 MMOL/L (ref 3.5–5.1)
PROT SERPL-MCNC: 8.5 G/DL (ref 6.4–8.2)
RBC # BLD AUTO: 4.05 M/UL (ref 3.8–5.2)
SODIUM SERPL-SCNC: 136 MMOL/L (ref 136–145)
TROPONIN I SERPL-MCNC: <0.05 NG/ML
WBC # BLD AUTO: 10.1 K/UL (ref 3.6–11)

## 2021-09-14 PROCEDURE — 96375 TX/PRO/DX INJ NEW DRUG ADDON: CPT

## 2021-09-14 PROCEDURE — 36415 COLL VENOUS BLD VENIPUNCTURE: CPT

## 2021-09-14 PROCEDURE — 74011250636 HC RX REV CODE- 250/636: Performed by: EMERGENCY MEDICINE

## 2021-09-14 PROCEDURE — 70450 CT HEAD/BRAIN W/O DYE: CPT

## 2021-09-14 PROCEDURE — 96374 THER/PROPH/DIAG INJ IV PUSH: CPT

## 2021-09-14 PROCEDURE — 71046 X-RAY EXAM CHEST 2 VIEWS: CPT

## 2021-09-14 PROCEDURE — 80053 COMPREHEN METABOLIC PANEL: CPT

## 2021-09-14 PROCEDURE — 99284 EMERGENCY DEPT VISIT MOD MDM: CPT

## 2021-09-14 PROCEDURE — 83690 ASSAY OF LIPASE: CPT

## 2021-09-14 PROCEDURE — 93005 ELECTROCARDIOGRAM TRACING: CPT

## 2021-09-14 PROCEDURE — 85025 COMPLETE CBC W/AUTO DIFF WBC: CPT

## 2021-09-14 PROCEDURE — 74011250637 HC RX REV CODE- 250/637: Performed by: EMERGENCY MEDICINE

## 2021-09-14 PROCEDURE — 84484 ASSAY OF TROPONIN QUANT: CPT

## 2021-09-14 PROCEDURE — 74174 CTA ABD&PLVS W/CONTRAST: CPT

## 2021-09-14 PROCEDURE — 74011000636 HC RX REV CODE- 636: Performed by: EMERGENCY MEDICINE

## 2021-09-14 RX ORDER — KETOROLAC TROMETHAMINE 30 MG/ML
30 INJECTION, SOLUTION INTRAMUSCULAR; INTRAVENOUS
Status: COMPLETED | OUTPATIENT
Start: 2021-09-14 | End: 2021-09-14

## 2021-09-14 RX ORDER — AMLODIPINE BESYLATE 5 MG/1
5 TABLET ORAL
Status: COMPLETED | OUTPATIENT
Start: 2021-09-14 | End: 2021-09-14

## 2021-09-14 RX ORDER — KETOROLAC TROMETHAMINE 10 MG/1
10 TABLET, FILM COATED ORAL
Qty: 10 TABLET | Refills: 0 | Status: SHIPPED | OUTPATIENT
Start: 2021-09-14

## 2021-09-14 RX ORDER — AMLODIPINE BESYLATE 5 MG/1
5 TABLET ORAL DAILY
Qty: 30 TABLET | Refills: 0 | Status: SHIPPED | OUTPATIENT
Start: 2021-09-14 | End: 2021-10-14

## 2021-09-14 RX ORDER — BUTALBITAL, ACETAMINOPHEN AND CAFFEINE 50; 325; 40 MG/1; MG/1; MG/1
1 TABLET ORAL
Status: COMPLETED | OUTPATIENT
Start: 2021-09-14 | End: 2021-09-14

## 2021-09-14 RX ORDER — MORPHINE SULFATE 2 MG/ML
4 INJECTION, SOLUTION INTRAMUSCULAR; INTRAVENOUS
Status: DISCONTINUED | OUTPATIENT
Start: 2021-09-14 | End: 2021-09-15 | Stop reason: HOSPADM

## 2021-09-14 RX ORDER — BUTALBITAL, ACETAMINOPHEN AND CAFFEINE 300; 40; 50 MG/1; MG/1; MG/1
1 CAPSULE ORAL
Qty: 20 CAPSULE | Refills: 0 | Status: SHIPPED | OUTPATIENT
Start: 2021-09-14

## 2021-09-14 RX ORDER — DICYCLOMINE HYDROCHLORIDE 20 MG/1
20 TABLET ORAL
Qty: 20 TABLET | Refills: 0 | Status: SHIPPED | OUTPATIENT
Start: 2021-09-14

## 2021-09-14 RX ORDER — POTASSIUM CHLORIDE 1.5 G/1.77G
20 POWDER, FOR SOLUTION ORAL DAILY
Qty: 1 PACKET | Refills: 0 | Status: SHIPPED | OUTPATIENT
Start: 2021-09-14

## 2021-09-14 RX ORDER — POTASSIUM CHLORIDE 750 MG/1
40 TABLET, FILM COATED, EXTENDED RELEASE ORAL
Status: DISCONTINUED | OUTPATIENT
Start: 2021-09-14 | End: 2021-09-14

## 2021-09-14 RX ORDER — LISINOPRIL 10 MG/1
10 TABLET ORAL DAILY
Qty: 30 TABLET | Refills: 0 | Status: SHIPPED | OUTPATIENT
Start: 2021-09-14 | End: 2021-10-14

## 2021-09-14 RX ORDER — ONDANSETRON 2 MG/ML
4 INJECTION INTRAMUSCULAR; INTRAVENOUS
Status: COMPLETED | OUTPATIENT
Start: 2021-09-14 | End: 2021-09-14

## 2021-09-14 RX ADMIN — AMLODIPINE BESYLATE 5 MG: 5 TABLET ORAL at 22:02

## 2021-09-14 RX ADMIN — ONDANSETRON 4 MG: 2 INJECTION INTRAMUSCULAR; INTRAVENOUS at 21:23

## 2021-09-14 RX ADMIN — IOPAMIDOL 100 ML: 755 INJECTION, SOLUTION INTRAVENOUS at 20:48

## 2021-09-14 RX ADMIN — BUTALBITAL, ACETAMINOPHEN, AND CAFFEINE 1 TABLET: 50; 325; 40 TABLET ORAL at 22:02

## 2021-09-14 RX ADMIN — KETOROLAC TROMETHAMINE 30 MG: 30 INJECTION, SOLUTION INTRAMUSCULAR; INTRAVENOUS at 22:02

## 2021-09-15 LAB
ATRIAL RATE: 102 BPM
CALCULATED P AXIS, ECG09: 41 DEGREES
CALCULATED R AXIS, ECG10: -8 DEGREES
CALCULATED T AXIS, ECG11: 58 DEGREES
DIAGNOSIS, 93000: NORMAL
P-R INTERVAL, ECG05: 182 MS
Q-T INTERVAL, ECG07: 354 MS
QRS DURATION, ECG06: 82 MS
QTC CALCULATION (BEZET), ECG08: 461 MS
VENTRICULAR RATE, ECG03: 102 BPM

## 2021-09-15 NOTE — ED PROVIDER NOTES
EMERGENCY DEPARTMENT HISTORY AND PHYSICAL EXAM      Date: 9/14/2021  Patient Name: Todd Lei    History of Presenting Illness     Chief Complaint   Patient presents with    Chest Pain     Chest pain and abdominal pain that started last night but got worse today. Use heroin last night.  Abdominal Pain     Mid abd pain around her belly button. History Provided By: Patient    HPI: Todd Lei, 40 y.o. female presents to the ED with cc of chest pain, abdominal pain and headache. Patient's chest pain and abdominal pain started yesterday. She has had constant midsternal chest pain which is a nine out of 10 in severity. She denies any radiation to the neck, jaw or arms. She also has mid abdominal pain which radiates to the back. That is a 9 out of 10 as well. She denies nausea, vomiting, cough, fever or diaphoresis. She denies leg pain or leg edema. She ran out of her blood pressure medicine 2 months ago, and states she has had a headache today which is a 5 out of 10 in severity. She denies neck pain. There are no other complaints, changes, or physical findings at this time. PCP: Joan Berry NP    No current facility-administered medications on file prior to encounter. Current Outpatient Medications on File Prior to Encounter   Medication Sig Dispense Refill    cephALEXin (KEFLEX) 500 mg capsule Take 1 Cap by mouth three (3) times daily. 15 Cap 0    traZODone (DESYREL) 150 mg tablet Take 150 mg by mouth nightly.  amLODIPine (NORVASC) 5 mg tablet Take 5 mg by mouth two (2) times a day. Past History     Past Medical History:  Past Medical History:   Diagnosis Date    Asthma     Hypertension     Murmur     Seizures ([address]        Past Surgical History:  Past Surgical History:   Procedure Laterality Date    HX CHOLECYSTECTOMY         Family History:  No family history on file.     Social History:  Social History     Tobacco Use    Smoking status: Current Every Day Smoker    Smokeless tobacco: Never Used   Substance Use Topics    Alcohol use: Yes    Drug use: No       Allergies: Allergies   Allergen Reactions    Penicillin G Hoarseness         Review of Systems   Review of Systems   Constitutional: Negative for fever. HENT: Negative for congestion. Eyes: Negative. Respiratory: Negative for cough. Cardiovascular: Positive for chest pain. Gastrointestinal: Positive for abdominal pain. Endocrine: Negative for heat intolerance. Genitourinary: Negative. Musculoskeletal: Positive for back pain. Skin: Negative for rash. Allergic/Immunologic: Negative for immunocompromised state. Neurological: Positive for headaches. Negative for dizziness. Hematological: Does not bruise/bleed easily. Psychiatric/Behavioral: Negative. All other systems reviewed and are negative. Physical Exam   Physical Exam  Vitals and nursing note reviewed. Constitutional:       General: She is not in acute distress. Appearance: She is well-developed. HENT:      Head: Normocephalic. Cardiovascular:      Rate and Rhythm: Normal rate and regular rhythm. Heart sounds: Normal heart sounds. Pulmonary:      Effort: Pulmonary effort is normal.      Breath sounds: Normal breath sounds. Chest:      Chest wall: Tenderness present. Comments: Reproducible chest wall tenderness  Abdominal:      General: Bowel sounds are normal.      Palpations: Abdomen is soft. Tenderness: There is abdominal tenderness. Comments: Bilateral lower quadrant tenderness   Musculoskeletal:         General: Normal range of motion. Cervical back: Normal range of motion and neck supple. Skin:     General: Skin is warm and dry. Neurological:      General: No focal deficit present. Mental Status: She is alert and oriented to person, place, and time.    Psychiatric:         Mood and Affect: Mood normal.         Behavior: Behavior normal.         Diagnostic Study Results     Labs -     Recent Results (from the past 12 hour(s))   EKG, 12 LEAD, INITIAL    Collection Time: 09/14/21  6:42 PM   Result Value Ref Range    Ventricular Rate 102 BPM    Atrial Rate 102 BPM    P-R Interval 182 ms    QRS Duration 82 ms    Q-T Interval 354 ms    QTC Calculation (Bezet) 461 ms    Calculated P Axis 41 degrees    Calculated R Axis -8 degrees    Calculated T Axis 58 degrees    Diagnosis       Sinus tachycardia  Possible Left atrial enlargement  Left ventricular hypertrophy  When compared with ECG of 08-SEP-2019 03:07,  No significant change was found     CBC WITH AUTOMATED DIFF    Collection Time: 09/14/21  6:46 PM   Result Value Ref Range    WBC 10.1 3.6 - 11.0 K/uL    RBC 4.05 3.80 - 5.20 M/uL    HGB 12.5 11.5 - 16.0 g/dL    HCT 38.1 35.0 - 47.0 %    MCV 94.1 80.0 - 99.0 FL    MCH 30.9 26.0 - 34.0 PG    MCHC 32.8 30.0 - 36.5 g/dL    RDW 12.1 11.5 - 14.5 %    PLATELET 398 926 - 061 K/uL    MPV 10.2 8.9 - 12.9 FL    NRBC 0.0 0  WBC    ABSOLUTE NRBC 0.00 0.00 - 0.01 K/uL    NEUTROPHILS 75 32 - 75 %    LYMPHOCYTES 15 12 - 49 %    MONOCYTES 6 5 - 13 %    EOSINOPHILS 3 0 - 7 %    BASOPHILS 1 0 - 1 %    IMMATURE GRANULOCYTES 0 0.0 - 0.5 %    ABS. NEUTROPHILS 7.5 1.8 - 8.0 K/UL    ABS. LYMPHOCYTES 1.5 0.8 - 3.5 K/UL    ABS. MONOCYTES 0.6 0.0 - 1.0 K/UL    ABS. EOSINOPHILS 0.3 0.0 - 0.4 K/UL    ABS. BASOPHILS 0.1 0.0 - 0.1 K/UL    ABS. IMM.  GRANS. 0.0 0.00 - 0.04 K/UL    DF AUTOMATED     METABOLIC PANEL, COMPREHENSIVE    Collection Time: 09/14/21  6:46 PM   Result Value Ref Range    Sodium 136 136 - 145 mmol/L    Potassium 3.3 (L) 3.5 - 5.1 mmol/L    Chloride 101 97 - 108 mmol/L    CO2 27 21 - 32 mmol/L    Anion gap 8 5 - 15 mmol/L    Glucose 120 (H) 65 - 100 mg/dL    BUN 8 6 - 20 MG/DL    Creatinine 0.74 0.55 - 1.02 MG/DL    BUN/Creatinine ratio 11 (L) 12 - 20      GFR est AA >60 >60 ml/min/1.73m2    GFR est non-AA >60 >60 ml/min/1.73m2    Calcium 9.4 8.5 - 10.1 MG/DL    Bilirubin, total 0.4 0.2 - 1.0 MG/DL    ALT (SGPT) 34 12 - 78 U/L    AST (SGOT) 37 15 - 37 U/L    Alk. phosphatase 90 45 - 117 U/L    Protein, total 8.5 (H) 6.4 - 8.2 g/dL    Albumin 4.2 3.5 - 5.0 g/dL    Globulin 4.3 (H) 2.0 - 4.0 g/dL    A-G Ratio 1.0 (L) 1.1 - 2.2     TROPONIN I    Collection Time: 09/14/21  6:46 PM   Result Value Ref Range    Troponin-I, Qt. <0.05 <0.05 ng/mL   LIPASE    Collection Time: 09/14/21  6:46 PM   Result Value Ref Range    Lipase 43 (L) 73 - 393 U/L       Radiologic Studies -   CTA ABDOMEN PELV W CONT   Final Result   1. No acute intra-abdominal pathology. Patent vasculature. 2.  Small fat-containing ventral hernia with mild periumbilical fat stranding. CT HEAD WO CONT   Final Result   No acute intracranial abnormality. Mild chronic small vessel ischemic disease. XR CHEST PA LAT   Final Result   No acute cardiac pulmonary process        CT Results  (Last 48 hours)    None        CXR Results  (Last 48 hours)               09/14/21 1940  XR CHEST PA LAT Final result    Impression:  No acute cardiac pulmonary process       Narrative:  EXAM: XR CHEST PA LAT       INDICATION: Chest Pain       COMPARISON: 9/8/2019. FINDINGS: PA and lateral radiographs of the chest demonstrate clear lungs. The   cardiac and mediastinal contours and pulmonary vascularity are normal. The bones   and soft tissues are within normal limits. Medical Decision Making   I am the first provider for this patient. I reviewed the vital signs, available nursing notes, past medical history, past surgical history, family history and social history. Vital Signs-Reviewed the patient's vital signs. Patient Vitals for the past 12 hrs:   Temp Pulse Resp BP SpO2   09/14/21 1838 98.1 °F (36.7 °C) (!) 113 22 (!) 179/105 100 %       EKG interpretation: (Preliminary)  Rhythm: sinus tachycardia; and regular . Rate (approx.): 102;  Axis: normal; MN interval: normal; QRS interval: normal ; ST/T wave: non-specific changes; Other findings: unchanged from previous ekg. Records Reviewed: Nursing Notes, Old Medical Records, Previous electrocardiograms, Previous Radiology Studies and Previous Laboratory Studies    Provider Notes (Medical Decision Making):   Diverticulitis, appendicitis, ACS, obstruction, UTI, costochondritis, tension headache, intracranial hemorrhage,    ED Course:   Initial assessment performed. The patients presenting problems have been discussed, and they are in agreement with the care plan formulated and outlined with them. I have encouraged them to ask questions as they arise throughout their visit. Progress note:    Patient is feeling better. Her results were reviewed. She is advised to follow-up and return to ER if worse               Critical Care Time:   0    Disposition:  home    DISCHARGE PLAN:  1. Discharge Medication List as of 9/14/2021  9:52 PM      START taking these medications    Details   amLODIPine (Norvasc) 5 mg tablet Take 1 Tablet by mouth daily for 30 days. , Normal, Disp-30 Tablet, R-0      lisinopriL (PriniviL) 10 mg tablet Take 1 Tablet by mouth daily for 30 days. , Normal, Disp-30 Tablet, R-0      dicyclomine (BENTYL) 20 mg tablet Take 1 Tablet by mouth every six (6) hours as needed for Abdominal Cramps., Normal, Disp-20 Tablet, R-0      butalbital-acetaminophen-caff (Fioricet) -40 mg per capsule Take 1 Capsule by mouth every four (4) hours as needed for Headache., Normal, Disp-20 Capsule, R-0      ketorolac (TORADOL) 10 mg tablet Take 1 Tablet by mouth every six (6) hours as needed for Pain., Normal, Disp-10 Tablet, R-0      potassium chloride (Klor-Con) 20 mEq pack Take 1 Packet by mouth daily. , Normal, Disp-1 Packet, R-0         CONTINUE these medications which have NOT CHANGED    Details   cephALEXin (KEFLEX) 500 mg capsule Take 1 Cap by mouth three (3) times daily. , Print, Disp-15 Cap, R-0      traZODone (DESYREL) 150 mg tablet Take 150 mg by mouth nightly., Historical Med           2. Follow-up Information     Follow up With Specialties Details Why Contact Info    Lilibeth Long NP Nurse Practitioner  As needed [address] [place]  [phone]      [facility] EMERGENCY DEPT Emergency Medicine  If symptoms worsen [address]   [address] [address]  [phone]    [facility] Cardiology  As needed [address]   [address] [address] [phone]        3. Return to ED if worse     Diagnosis     Clinical Impression:   1. Acute abdominal pain    2. Acute chest pain    3. Tension headache    4. Essential hypertension        Attestations:    Henok Adams MD    Please note that this dictation was completed with Pivotstream, the computer voice recognition software. Quite often unanticipated grammatical, syntax, homophones, and other interpretive errors are inadvertently transcribed by the computer software. Please disregard these errors. Please excuse any errors that have escaped final proofreading. Thank you.

## 2021-09-17 ENCOUNTER — HOSPITAL ENCOUNTER (EMERGENCY)
Age: 46
Discharge: HOME | End: 2021-09-17
Attending: STUDENT IN AN ORGANIZED HEALTH CARE EDUCATION/TRAINING PROGRAM
Payer: MEDICARE

## 2021-09-17 VITALS
SYSTOLIC BLOOD PRESSURE: 114 MMHG | HEIGHT: 66 IN | OXYGEN SATURATION: 97 % | RESPIRATION RATE: 19 BRPM | TEMPERATURE: 98.1 F | WEIGHT: 196 LBS | BODY MASS INDEX: 31.5 KG/M2 | HEART RATE: 97 BPM | DIASTOLIC BLOOD PRESSURE: 67 MMHG

## 2021-09-17 DIAGNOSIS — R42 DIZZINESS: Primary | ICD-10-CM

## 2021-09-17 LAB
ALBUMIN SERPL-MCNC: 3.7 G/DL (ref 3.5–5)
ALBUMIN/GLOB SERPL: 0.8 {RATIO} (ref 1.1–2.2)
ALP SERPL-CCNC: 83 U/L (ref 45–117)
ALT SERPL-CCNC: 40 U/L (ref 12–78)
ANION GAP SERPL CALC-SCNC: 12 MMOL/L (ref 5–15)
AST SERPL-CCNC: 57 U/L (ref 15–37)
ATRIAL RATE: 67 BPM
BASOPHILS # BLD: 0 K/UL (ref 0–0.1)
BASOPHILS NFR BLD: 1 % (ref 0–1)
BILIRUB SERPL-MCNC: 0.3 MG/DL (ref 0.2–1)
BUN SERPL-MCNC: 13 MG/DL (ref 6–20)
BUN/CREAT SERPL: 15 (ref 12–20)
CALCIUM SERPL-MCNC: 9.4 MG/DL (ref 8.5–10.1)
CALCULATED P AXIS, ECG09: 29 DEGREES
CALCULATED R AXIS, ECG10: 6 DEGREES
CALCULATED T AXIS, ECG11: 30 DEGREES
CHLORIDE SERPL-SCNC: 102 MMOL/L (ref 97–108)
CO2 SERPL-SCNC: 21 MMOL/L (ref 21–32)
CREAT SERPL-MCNC: 0.89 MG/DL (ref 0.55–1.02)
DIAGNOSIS, 93000: NORMAL
DIFFERENTIAL METHOD BLD: ABNORMAL
EOSINOPHIL # BLD: 0.1 K/UL (ref 0–0.4)
EOSINOPHIL NFR BLD: 1 % (ref 0–7)
ERYTHROCYTE [DISTWIDTH] IN BLOOD BY AUTOMATED COUNT: 12 % (ref 11.5–14.5)
GLOBULIN SER CALC-MCNC: 4.4 G/DL (ref 2–4)
GLUCOSE SERPL-MCNC: 99 MG/DL (ref 65–100)
HCT VFR BLD AUTO: 37.4 % (ref 35–47)
HGB BLD-MCNC: 12.7 G/DL (ref 11.5–16)
IMM GRANULOCYTES # BLD AUTO: 0 K/UL (ref 0–0.04)
IMM GRANULOCYTES NFR BLD AUTO: 0 % (ref 0–0.5)
LYMPHOCYTES # BLD: 0.9 K/UL (ref 0.8–3.5)
LYMPHOCYTES NFR BLD: 13 % (ref 12–49)
MCH RBC QN AUTO: 31.2 PG (ref 26–34)
MCHC RBC AUTO-ENTMCNC: 34 G/DL (ref 30–36.5)
MCV RBC AUTO: 91.9 FL (ref 80–99)
MONOCYTES # BLD: 0.6 K/UL (ref 0–1)
MONOCYTES NFR BLD: 9 % (ref 5–13)
NEUTS SEG # BLD: 5.4 K/UL (ref 1.8–8)
NEUTS SEG NFR BLD: 76 % (ref 32–75)
NRBC # BLD: 0 K/UL (ref 0–0.01)
NRBC BLD-RTO: 0 PER 100 WBC
P-R INTERVAL, ECG05: 204 MS
PLATELET # BLD AUTO: 305 K/UL (ref 150–400)
PMV BLD AUTO: 10.5 FL (ref 8.9–12.9)
POTASSIUM SERPL-SCNC: 4.6 MMOL/L (ref 3.5–5.1)
PROT SERPL-MCNC: 8.1 G/DL (ref 6.4–8.2)
Q-T INTERVAL, ECG07: 378 MS
QRS DURATION, ECG06: 84 MS
QTC CALCULATION (BEZET), ECG08: 399 MS
RBC # BLD AUTO: 4.07 M/UL (ref 3.8–5.2)
SODIUM SERPL-SCNC: 135 MMOL/L (ref 136–145)
TROPONIN I SERPL-MCNC: <0.05 NG/ML
VENTRICULAR RATE, ECG03: 67 BPM
WBC # BLD AUTO: 7.1 K/UL (ref 3.6–11)

## 2021-09-17 PROCEDURE — 74011250636 HC RX REV CODE- 250/636: Performed by: STUDENT IN AN ORGANIZED HEALTH CARE EDUCATION/TRAINING PROGRAM

## 2021-09-17 PROCEDURE — 84484 ASSAY OF TROPONIN QUANT: CPT

## 2021-09-17 PROCEDURE — 80053 COMPREHEN METABOLIC PANEL: CPT

## 2021-09-17 PROCEDURE — 74011250637 HC RX REV CODE- 250/637: Performed by: STUDENT IN AN ORGANIZED HEALTH CARE EDUCATION/TRAINING PROGRAM

## 2021-09-17 PROCEDURE — 36415 COLL VENOUS BLD VENIPUNCTURE: CPT

## 2021-09-17 PROCEDURE — 93005 ELECTROCARDIOGRAM TRACING: CPT

## 2021-09-17 PROCEDURE — 85025 COMPLETE CBC W/AUTO DIFF WBC: CPT

## 2021-09-17 PROCEDURE — 99283 EMERGENCY DEPT VISIT LOW MDM: CPT

## 2021-09-17 RX ORDER — MECLIZINE HCL 12.5 MG 12.5 MG/1
25 TABLET ORAL
Status: COMPLETED | OUTPATIENT
Start: 2021-09-17 | End: 2021-09-17

## 2021-09-17 RX ORDER — MECLIZINE HYDROCHLORIDE 25 MG/1
25 TABLET ORAL
Qty: 12 TABLET | Refills: 0 | Status: SHIPPED | OUTPATIENT
Start: 2021-09-17 | End: 2021-09-27

## 2021-09-17 RX ORDER — BUTALBITAL, ACETAMINOPHEN AND CAFFEINE 50; 325; 40 MG/1; MG/1; MG/1
1 TABLET ORAL
Status: COMPLETED | OUTPATIENT
Start: 2021-09-17 | End: 2021-09-17

## 2021-09-17 RX ADMIN — MECLIZINE 25 MG: 12.5 TABLET ORAL at 15:50

## 2021-09-17 RX ADMIN — BUTALBITAL, ACETAMINOPHEN, AND CAFFEINE 1 TABLET: 50; 325; 40 TABLET ORAL at 16:40

## 2021-09-17 NOTE — ED PROVIDER NOTES
EMERGENCY DEPARTMENT HISTORY AND PHYSICAL EXAM      Date: 9/17/2021  Patient Name: Shahnaz Fuentes    History of Presenting Illness     Chief Complaint   Patient presents with    Hypertension    Dizziness         HPI: Shahnaz Fuentes, 40 y.o. female presents to the ED with cc of dizziness. She also states that her blood pressure has been elevated, she states that the systolics have been in the 130s. This has been going on for the past 2 days, she reports intermittent lightheadedness and dizziness, she states it feels a bit like things are spinning. She reports associated nausea with 2 episodes of emesis. No focal weakness, numbness or tingling in the arms or legs, no vision changes, no difficulty speaking or swallowing. She says that her potassium has been low in the past and it feels somewhat similar. She has been compliant with her antihypertensives. She denies any shortness of breath, does report some nonexertional random substernal chest pressure, she cannot identify any exacerbating or alleviating factors, this happened this morning, denies chest pain currently, states that she gets this pain on and off frequently. No recent fevers or cough, no leg pain or leg swelling. There are no other complaints, changes, or physical findings at this time. PCP: Karen Mayorga NP    No current facility-administered medications on file prior to encounter. Current Outpatient Medications on File Prior to Encounter   Medication Sig Dispense Refill    amLODIPine (Norvasc) 5 mg tablet Take 1 Tablet by mouth daily for 30 days. 30 Tablet 0    lisinopriL (PriniviL) 10 mg tablet Take 1 Tablet by mouth daily for 30 days. 30 Tablet 0    dicyclomine (BENTYL) 20 mg tablet Take 1 Tablet by mouth every six (6) hours as needed for Abdominal Cramps. 20 Tablet 0    butalbital-acetaminophen-caff (Fioricet) -40 mg per capsule Take 1 Capsule by mouth every four (4) hours as needed for Headache.  20 Capsule 0  ketorolac (TORADOL) 10 mg tablet Take 1 Tablet by mouth every six (6) hours as needed for Pain. 10 Tablet 0    potassium chloride (Klor-Con) 20 mEq pack Take 1 Packet by mouth daily. 1 Packet 0    cephALEXin (KEFLEX) 500 mg capsule Take 1 Cap by mouth three (3) times daily. 15 Cap 0    traZODone (DESYREL) 150 mg tablet Take 150 mg by mouth nightly. Past History     Past Medical History:  Past Medical History:   Diagnosis Date    Asthma     Hypertension     Murmur     Seizures ([address]        Past Surgical History:  Past Surgical History:   Procedure Laterality Date    HX CHOLECYSTECTOMY         Family History:  No family history on file. Social History:  Social History     Tobacco Use    Smoking status: Current Every Day Smoker    Smokeless tobacco: Never Used   Substance Use Topics    Alcohol use: Yes    Drug use: No       Allergies: Allergies   Allergen Reactions    Penicillin G Hoarseness         Review of Systems   no fever  No ear pain  No eye pain  no shortness of breath  Reports intermittent chest pain  no abdominal pain  no dysuria  no leg pain  No rash  No lymphadenopathy  No weight loss    Physical Exam   Physical Exam  Constitutional:       General: She is not in acute distress. Appearance: She is not toxic-appearing. HENT:      Head: Normocephalic and atraumatic. Eyes:      Extraocular Movements: Extraocular movements intact. Pupils: Pupils are equal, round, and reactive to light. Cardiovascular:      Rate and Rhythm: Normal rate and regular rhythm. Pulmonary:      Effort: Pulmonary effort is normal.      Breath sounds: Normal breath sounds. Abdominal:      Palpations: Abdomen is soft. Musculoskeletal:         General: No swelling or tenderness. Cervical back: Neck supple. No rigidity. Skin:     General: Skin is warm and dry. Neurological:      General: No focal deficit present.       Mental Status: She is alert and oriented to person, place, and time.      Cranial Nerves: No cranial nerve deficit. Comments: 5/5 strength with bicep flexion and extension bilaterally, 5/5 strength with ankle flexion and extension bilaterally. Sensation to light touch intact over upper and lower extremities bilaterally. Normal finger-to-nose test bilaterally. Psychiatric:         Mood and Affect: Mood normal.         Diagnostic Study Results     Labs -     Recent Results (from the past 24 hour(s))   EKG, 12 LEAD, INITIAL    Collection Time: 09/17/21  3:44 PM   Result Value Ref Range    Ventricular Rate 67 BPM    Atrial Rate 67 BPM    P-R Interval 204 ms    QRS Duration 84 ms    Q-T Interval 378 ms    QTC Calculation (Bezet) 399 ms    Calculated P Axis 29 degrees    Calculated R Axis 6 degrees    Calculated T Axis 30 degrees    Diagnosis       Normal sinus rhythm  Normal ECG  When compared with ECG of 14-SEP-2021 18:42,  Vent. rate has decreased BY  35 BPM  QT has shortened     CBC WITH AUTOMATED DIFF    Collection Time: 09/17/21  3:53 PM   Result Value Ref Range    WBC 7.1 3.6 - 11.0 K/uL    RBC 4.07 3.80 - 5.20 M/uL    HGB 12.7 11.5 - 16.0 g/dL    HCT 37.4 35.0 - 47.0 %    MCV 91.9 80.0 - 99.0 FL    MCH 31.2 26.0 - 34.0 PG    MCHC 34.0 30.0 - 36.5 g/dL    RDW 12.0 11.5 - 14.5 %    PLATELET 689 317 - 501 K/uL    MPV 10.5 8.9 - 12.9 FL    NRBC 0.0 0  WBC    ABSOLUTE NRBC 0.00 0.00 - 0.01 K/uL    NEUTROPHILS 76 (H) 32 - 75 %    LYMPHOCYTES 13 12 - 49 %    MONOCYTES 9 5 - 13 %    EOSINOPHILS 1 0 - 7 %    BASOPHILS 1 0 - 1 %    IMMATURE GRANULOCYTES 0 0.0 - 0.5 %    ABS. NEUTROPHILS 5.4 1.8 - 8.0 K/UL    ABS. LYMPHOCYTES 0.9 0.8 - 3.5 K/UL    ABS. MONOCYTES 0.6 0.0 - 1.0 K/UL    ABS. EOSINOPHILS 0.1 0.0 - 0.4 K/UL    ABS. BASOPHILS 0.0 0.0 - 0.1 K/UL    ABS. IMM.  GRANS. 0.0 0.00 - 0.04 K/UL    DF AUTOMATED         Radiologic Studies -   No orders to display     CT Results  (Last 48 hours)    None        CXR Results  (Last 48 hours)    None            Medical Decision Making   I am the first provider for this patient. I reviewed the vital signs, available nursing notes, past medical history, past surgical history, family history and social history. Vital Signs-Reviewed the patient's vital signs. Patient Vitals for the past 24 hrs:   Temp Pulse Resp BP SpO2   09/17/21 1412 98.1 °F (36.7 °C) 97 19 114/67 97 %         Provider Notes (Medical Decision Making):   81-year-old female presenting with dizziness. Symptoms concerning for possible arrhythmia, orthostatic hypotension, will assess for any electrolyte or metabolic abnormalities, possible vertigo. She is normotensive here today. Neurologic exam is unremarkable, not concerning for any acute intracranial emergency otherwise. She does report some atypical chest pain, seems to be chronic in nature, however given her comorbidity of hypertension, ACS work-up will be initiated, this is nonexertional, no associated symptoms that would be concerning for cardiac pain otherwise. Differential with musculoskeletal pain, GERD. ED Course:     Initial assessment performed. The patients presenting problems have been discussed, and they are in agreement with the care plan formulated and outlined with them. I have encouraged them to ask questions as they arise throughout their visit. EKG is performed at 15: 44, shows sinus rhythm at a rate of 67, , QRS 84, QTc 399, axis upright, no ST segment elevation or depression concerning for ACS, this is interpreted as normal sinus rhythm. CBC negative for leukocytosis or anemia, troponin negative. On reevaluation, the patient is resting comfortably, vital signs normal, she ambulates around without difficulty. Patient is counseled on supportive care and return precautions. Will return to the ED for any worsening pain, dizziness, weakness or numbness, or any new or worrisome symptoms. Will followup with primary care doctor within 3 days.     Critical Care Time: Disposition:  Home    PLAN:  1. Current Discharge Medication List        2.    Follow-up Information    None       Return to ED if worse     Diagnosis     Clinical Impression: Acute dizziness, acute atypical chest pain

## 2021-09-17 NOTE — LETTER
[address]  [facility] EMERGENCY DEPT  [address] [address] [phone]    Work/School Note    Date: 9/17/2021    To Whom It May concern:    Drea Mendez was seen and treated today in the emergency room by the following provider(s):  Attending Provider: Elroy Moreno MD.      Drea Mendez may return to work on 9/20/2021.     Sincerely,                    Criss PAREDES

## 2021-09-17 NOTE — ED NOTES
Patient (s) 0 given copy of dc instructions and 0 paper script(s) and 1 electronic scripts. Patient (s)  verbalized understanding of instructions and script (s). Patient given a current medication reconciliation form and verbalized understanding of their medications. Patient (s) verbalized understanding of the importance of discussing medications with  his or her physician or clinic they will be following up with. Patient alert and oriented and in no acute distress. Patient offered wheelchair from treatment area to hospital entrance, patient declines wheelchair.

## 2021-09-17 NOTE — ED TRIAGE NOTES
Per pt reports dizziness and hypertension, denies chest pain. Admits to taking Amlodipine and Lisinopril today. Pt is alert and oriented x 4 and speech is clear, no acute distress noted.

## 2024-09-29 ENCOUNTER — HOSPITAL ENCOUNTER (EMERGENCY)
Facility: HOSPITAL | Age: 49
Discharge: HOME | End: 2024-09-29
Attending: EMERGENCY MEDICINE
Payer: COMMERCIAL

## 2024-09-29 ENCOUNTER — APPOINTMENT (OUTPATIENT)
Facility: HOSPITAL | Age: 49
End: 2024-09-29
Payer: COMMERCIAL

## 2024-09-29 VITALS
HEART RATE: 67 BPM | SYSTOLIC BLOOD PRESSURE: 161 MMHG | TEMPERATURE: 98.9 F | OXYGEN SATURATION: 100 % | BODY MASS INDEX: 26.47 KG/M2 | RESPIRATION RATE: 15 BRPM | HEIGHT: 66 IN | WEIGHT: 164.7 LBS | DIASTOLIC BLOOD PRESSURE: 92 MMHG

## 2024-09-29 DIAGNOSIS — F11.93 OPIOID WITHDRAWAL (HCC): ICD-10-CM

## 2024-09-29 DIAGNOSIS — R10.32 LEFT LOWER QUADRANT ABDOMINAL PAIN: Primary | ICD-10-CM

## 2024-09-29 DIAGNOSIS — R11.2 NAUSEA AND VOMITING, UNSPECIFIED VOMITING TYPE: ICD-10-CM

## 2024-09-29 LAB
ALBUMIN SERPL-MCNC: 4.5 G/DL (ref 3.5–5)
ALBUMIN/GLOB SERPL: 0.9 (ref 1.1–2.2)
ALP SERPL-CCNC: 105 U/L (ref 45–117)
ALT SERPL-CCNC: 31 U/L (ref 12–78)
AMPHET UR QL SCN: NEGATIVE
ANION GAP SERPL CALC-SCNC: 14 MMOL/L (ref 2–12)
APPEARANCE UR: CLEAR
AST SERPL-CCNC: 27 U/L (ref 15–37)
BACTERIA URNS QL MICRO: NEGATIVE /HPF
BARBITURATES UR QL SCN: NEGATIVE
BASOPHILS # BLD: 0.1 K/UL (ref 0–0.1)
BASOPHILS NFR BLD: 1 % (ref 0–1)
BENZODIAZ UR QL: NEGATIVE
BILIRUB SERPL-MCNC: 0.6 MG/DL (ref 0.2–1)
BILIRUB UR QL: NEGATIVE
BUN SERPL-MCNC: 13 MG/DL (ref 6–20)
BUN/CREAT SERPL: 14 (ref 12–20)
CALCIUM SERPL-MCNC: 10.8 MG/DL (ref 8.5–10.1)
CANNABINOIDS UR QL SCN: NEGATIVE
CHLORIDE SERPL-SCNC: 98 MMOL/L (ref 97–108)
CO2 SERPL-SCNC: 27 MMOL/L (ref 21–32)
COCAINE UR QL SCN: NEGATIVE
COLOR UR: ABNORMAL
CREAT SERPL-MCNC: 0.96 MG/DL (ref 0.55–1.02)
DIFFERENTIAL METHOD BLD: ABNORMAL
EOSINOPHIL # BLD: 0.1 K/UL (ref 0–0.4)
EOSINOPHIL NFR BLD: 1 % (ref 0–7)
EPITH CASTS URNS QL MICRO: ABNORMAL /LPF
ERYTHROCYTE [DISTWIDTH] IN BLOOD BY AUTOMATED COUNT: 12.5 % (ref 11.5–14.5)
ETHANOL SERPL-MCNC: <10 MG/DL (ref 0–0.08)
FLUAV RNA SPEC QL NAA+PROBE: NOT DETECTED
FLUBV RNA SPEC QL NAA+PROBE: NOT DETECTED
GLOBULIN SER CALC-MCNC: 4.9 G/DL (ref 2–4)
GLUCOSE SERPL-MCNC: 106 MG/DL (ref 65–100)
GLUCOSE UR STRIP.AUTO-MCNC: NEGATIVE MG/DL
HCG UR QL: NEGATIVE
HCT VFR BLD AUTO: 46.6 % (ref 35–47)
HGB BLD-MCNC: 15.3 G/DL (ref 11.5–16)
HGB UR QL STRIP: NEGATIVE
IMM GRANULOCYTES # BLD AUTO: 0 K/UL (ref 0–0.04)
IMM GRANULOCYTES NFR BLD AUTO: 0 % (ref 0–0.5)
KETONES UR QL STRIP.AUTO: ABNORMAL MG/DL
LEUKOCYTE ESTERASE UR QL STRIP.AUTO: NEGATIVE
LIPASE SERPL-CCNC: 23 U/L (ref 13–75)
LYMPHOCYTES # BLD: 0.8 K/UL (ref 0.8–3.5)
LYMPHOCYTES NFR BLD: 10 % (ref 12–49)
Lab: ABNORMAL
MCH RBC QN AUTO: 29 PG (ref 26–34)
MCHC RBC AUTO-ENTMCNC: 32.8 G/DL (ref 30–36.5)
MCV RBC AUTO: 88.3 FL (ref 80–99)
METHADONE UR QL: NEGATIVE
MONOCYTES # BLD: 0.4 K/UL (ref 0–1)
MONOCYTES NFR BLD: 5 % (ref 5–13)
NEUTS SEG # BLD: 6.4 K/UL (ref 1.8–8)
NEUTS SEG NFR BLD: 83 % (ref 32–75)
NITRITE UR QL STRIP.AUTO: NEGATIVE
NRBC # BLD: 0 K/UL (ref 0–0.01)
NRBC BLD-RTO: 0 PER 100 WBC
OPIATES UR QL: POSITIVE
PCP UR QL: NEGATIVE
PH UR STRIP: 5.5 (ref 5–8)
PLATELET # BLD AUTO: 289 K/UL (ref 150–400)
PMV BLD AUTO: 10.4 FL (ref 8.9–12.9)
POTASSIUM SERPL-SCNC: 3.7 MMOL/L (ref 3.5–5.1)
PROT SERPL-MCNC: 9.4 G/DL (ref 6.4–8.2)
PROT UR STRIP-MCNC: NEGATIVE MG/DL
RBC # BLD AUTO: 5.28 M/UL (ref 3.8–5.2)
RBC #/AREA URNS HPF: ABNORMAL /HPF (ref 0–5)
SARS-COV-2 RNA RESP QL NAA+PROBE: NOT DETECTED
SODIUM SERPL-SCNC: 139 MMOL/L (ref 136–145)
SOURCE: NORMAL
SP GR UR REFRACTOMETRY: 1.03 (ref 1–1.03)
UROBILINOGEN UR QL STRIP.AUTO: 0.2 EU/DL (ref 0.2–1)
WBC # BLD AUTO: 7.7 K/UL (ref 3.6–11)
WBC URNS QL MICRO: ABNORMAL /HPF (ref 0–4)

## 2024-09-29 PROCEDURE — 96372 THER/PROPH/DIAG INJ SC/IM: CPT

## 2024-09-29 PROCEDURE — 85025 COMPLETE CBC W/AUTO DIFF WBC: CPT

## 2024-09-29 PROCEDURE — 80053 COMPREHEN METABOLIC PANEL: CPT

## 2024-09-29 PROCEDURE — 6370000000 HC RX 637 (ALT 250 FOR IP): Performed by: EMERGENCY MEDICINE

## 2024-09-29 PROCEDURE — 6360000002 HC RX W HCPCS: Performed by: EMERGENCY MEDICINE

## 2024-09-29 PROCEDURE — 2580000003 HC RX 258: Performed by: EMERGENCY MEDICINE

## 2024-09-29 PROCEDURE — 74177 CT ABD & PELVIS W/CONTRAST: CPT

## 2024-09-29 PROCEDURE — 80307 DRUG TEST PRSMV CHEM ANLYZR: CPT

## 2024-09-29 PROCEDURE — 96375 TX/PRO/DX INJ NEW DRUG ADDON: CPT

## 2024-09-29 PROCEDURE — 96374 THER/PROPH/DIAG INJ IV PUSH: CPT

## 2024-09-29 PROCEDURE — 87636 SARSCOV2 & INF A&B AMP PRB: CPT

## 2024-09-29 PROCEDURE — 82077 ASSAY SPEC XCP UR&BREATH IA: CPT

## 2024-09-29 PROCEDURE — 81001 URINALYSIS AUTO W/SCOPE: CPT

## 2024-09-29 PROCEDURE — 81025 URINE PREGNANCY TEST: CPT

## 2024-09-29 PROCEDURE — 99285 EMERGENCY DEPT VISIT HI MDM: CPT

## 2024-09-29 PROCEDURE — 83690 ASSAY OF LIPASE: CPT

## 2024-09-29 PROCEDURE — 96361 HYDRATE IV INFUSION ADD-ON: CPT

## 2024-09-29 PROCEDURE — 6360000004 HC RX CONTRAST MEDICATION: Performed by: EMERGENCY MEDICINE

## 2024-09-29 PROCEDURE — 36415 COLL VENOUS BLD VENIPUNCTURE: CPT

## 2024-09-29 PROCEDURE — 96376 TX/PRO/DX INJ SAME DRUG ADON: CPT

## 2024-09-29 RX ORDER — ONDANSETRON 2 MG/ML
4 INJECTION INTRAMUSCULAR; INTRAVENOUS EVERY 6 HOURS PRN
Status: DISCONTINUED | OUTPATIENT
Start: 2024-09-29 | End: 2024-09-29 | Stop reason: HOSPADM

## 2024-09-29 RX ORDER — CLONIDINE HYDROCHLORIDE 0.1 MG/1
0.2 TABLET ORAL
Status: COMPLETED | OUTPATIENT
Start: 2024-09-29 | End: 2024-09-29

## 2024-09-29 RX ORDER — FENTANYL CITRATE 50 UG/ML
50 INJECTION, SOLUTION INTRAMUSCULAR; INTRAVENOUS
Status: COMPLETED | OUTPATIENT
Start: 2024-09-29 | End: 2024-09-29

## 2024-09-29 RX ORDER — CLONIDINE 0.2 MG/24H
1 PATCH, EXTENDED RELEASE TRANSDERMAL WEEKLY
Qty: 4 PATCH | Refills: 0 | Status: SHIPPED | OUTPATIENT
Start: 2024-09-29

## 2024-09-29 RX ORDER — MORPHINE SULFATE 2 MG/ML
2 INJECTION, SOLUTION INTRAMUSCULAR; INTRAVENOUS
Status: COMPLETED | OUTPATIENT
Start: 2024-09-29 | End: 2024-09-29

## 2024-09-29 RX ORDER — ONDANSETRON 4 MG/1
4 TABLET, ORALLY DISINTEGRATING ORAL 3 TIMES DAILY PRN
Qty: 21 TABLET | Refills: 0 | Status: SHIPPED | OUTPATIENT
Start: 2024-09-29

## 2024-09-29 RX ORDER — DROPERIDOL 2.5 MG/ML
1.25 INJECTION, SOLUTION INTRAMUSCULAR; INTRAVENOUS
Status: COMPLETED | OUTPATIENT
Start: 2024-09-29 | End: 2024-09-29

## 2024-09-29 RX ORDER — PROMETHAZINE HYDROCHLORIDE 25 MG/1
25 SUPPOSITORY RECTAL EVERY 6 HOURS PRN
Qty: 12 SUPPOSITORY | Refills: 0 | Status: SHIPPED | OUTPATIENT
Start: 2024-09-29 | End: 2024-10-06

## 2024-09-29 RX ORDER — DICYCLOMINE HCL 20 MG
20 TABLET ORAL
Qty: 20 TABLET | Refills: 0 | Status: SHIPPED | OUTPATIENT
Start: 2024-09-29

## 2024-09-29 RX ORDER — DICYCLOMINE HYDROCHLORIDE 10 MG/ML
20 INJECTION INTRAMUSCULAR 4 TIMES DAILY
Status: DISCONTINUED | OUTPATIENT
Start: 2024-09-29 | End: 2024-09-29

## 2024-09-29 RX ORDER — 0.9 % SODIUM CHLORIDE 0.9 %
1000 INTRAVENOUS SOLUTION INTRAVENOUS ONCE
Status: COMPLETED | OUTPATIENT
Start: 2024-09-29 | End: 2024-09-29

## 2024-09-29 RX ORDER — DIPHENHYDRAMINE HYDROCHLORIDE 50 MG/ML
25 INJECTION INTRAMUSCULAR; INTRAVENOUS
Status: COMPLETED | OUTPATIENT
Start: 2024-09-29 | End: 2024-09-29

## 2024-09-29 RX ORDER — IOPAMIDOL 755 MG/ML
100 INJECTION, SOLUTION INTRAVASCULAR
Status: COMPLETED | OUTPATIENT
Start: 2024-09-29 | End: 2024-09-29

## 2024-09-29 RX ORDER — DROPERIDOL 2.5 MG/ML
1.25 INJECTION, SOLUTION INTRAMUSCULAR; INTRAVENOUS EVERY 6 HOURS PRN
Status: DISCONTINUED | OUTPATIENT
Start: 2024-09-29 | End: 2024-09-29 | Stop reason: HOSPADM

## 2024-09-29 RX ORDER — DICYCLOMINE HYDROCHLORIDE 10 MG/ML
20 INJECTION INTRAMUSCULAR
Status: COMPLETED | OUTPATIENT
Start: 2024-09-29 | End: 2024-09-29

## 2024-09-29 RX ORDER — METOCLOPRAMIDE HYDROCHLORIDE 5 MG/ML
10 INJECTION INTRAMUSCULAR; INTRAVENOUS EVERY 6 HOURS
Status: DISCONTINUED | OUTPATIENT
Start: 2024-09-29 | End: 2024-09-29 | Stop reason: HOSPADM

## 2024-09-29 RX ADMIN — FENTANYL CITRATE 50 MCG: 50 INJECTION, SOLUTION INTRAMUSCULAR; INTRAVENOUS at 15:55

## 2024-09-29 RX ADMIN — DROPERIDOL 1.25 MG: 2.5 INJECTION, SOLUTION INTRAMUSCULAR; INTRAVENOUS at 11:33

## 2024-09-29 RX ADMIN — SODIUM CHLORIDE 1000 ML: 9 INJECTION, SOLUTION INTRAVENOUS at 11:33

## 2024-09-29 RX ADMIN — CLONIDINE HYDROCHLORIDE 0.2 MG: 0.1 TABLET ORAL at 14:42

## 2024-09-29 RX ADMIN — IOPAMIDOL 100 ML: 755 INJECTION, SOLUTION INTRAVENOUS at 12:40

## 2024-09-29 RX ADMIN — DICYCLOMINE HYDROCHLORIDE 20 MG: 10 INJECTION, SOLUTION INTRAMUSCULAR at 14:41

## 2024-09-29 RX ADMIN — MORPHINE SULFATE 2 MG: 2 INJECTION, SOLUTION INTRAMUSCULAR; INTRAVENOUS at 12:08

## 2024-09-29 RX ADMIN — DIPHENHYDRAMINE HYDROCHLORIDE 25 MG: 50 INJECTION INTRAMUSCULAR; INTRAVENOUS at 13:56

## 2024-09-29 RX ADMIN — ONDANSETRON 4 MG: 2 INJECTION INTRAMUSCULAR; INTRAVENOUS at 12:19

## 2024-09-29 RX ADMIN — DROPERIDOL 1.25 MG: 2.5 INJECTION, SOLUTION INTRAMUSCULAR; INTRAVENOUS at 12:19

## 2024-09-29 RX ADMIN — METOCLOPRAMIDE 10 MG: 5 INJECTION, SOLUTION INTRAMUSCULAR; INTRAVENOUS at 13:56

## 2024-09-29 ASSESSMENT — PAIN SCALES - GENERAL
PAINLEVEL_OUTOF10: 9
PAINLEVEL_OUTOF10: 8
PAINLEVEL_OUTOF10: 8
PAINLEVEL_OUTOF10: 7
PAINLEVEL_OUTOF10: 10

## 2024-09-29 ASSESSMENT — PAIN - FUNCTIONAL ASSESSMENT
PAIN_FUNCTIONAL_ASSESSMENT: 0-10

## 2024-09-29 NOTE — ED PROVIDER NOTES
[facility] EMERGENCY DEPT  EMERGENCY DEPARTMENT ENCOUNTER       Pt Name: Gemini Malcolm  MRN: [id number]  Birthdate 1975  Date of evaluation: 9/29/2024  Provider: Madelyn Torre MD   PCP: No primary care provider on file.  Note Started: 6:04 PM 9/29/24     (Please note that parts of this dictation were completed with voice recognition software. Quite often unanticipated grammatical, syntax, homophones, and other interpretive errors are inadvertently transcribed by the computer software. Please disregards these errors. Please excuse any errors that have escaped final proofreading.)    CHIEF COMPLAINT       Chief Complaint   Patient presents with    Abdominal Pain        HISTORY OF PRESENT ILLNESS: 1 or more elements      History From: patient, History limited by:  none     Gemini Malcolm is a 49 y.o. female who presents via EMS with acute onset left lower quadrant pain which began overnight.  Had several episodes of loose stool this morning which were dark.  She is vomiting and has believes she is throwing up some blood.  No history of similar symptoms previously.  Denies hematuria, dysuria.  She is status postcholecystectomy.  History of asthma.  History of heroin use.     Nursing Notes were all reviewed and agreed with or any disagreements were addressed in the HPI.     REVIEW OF SYSTEMS        Positives and Pertinent negatives as per HPI.    PAST HISTORY     Past Medical History:  Past Medical History:   Diagnosis Date    Asthma     Hypertension     Murmur     Seizures (HCC)        Past Surgical History:  Past Surgical History:   Procedure Laterality Date    CHOLECYSTECTOMY         Family History:  History reviewed. No pertinent family history.    Social History:  Social History     Tobacco Use    Smoking status: Every Day     Types: Cigarettes    Smokeless tobacco: Never   Substance Use Topics    Alcohol use: Yes    Drug use: Yes     Types: Other-see comments     Comment: HEROIN       Allergies:  No Known

## 2024-09-29 NOTE — ED NOTES
Pt presents to ED via EMS complaining of abd pain with N/V/D since last night. Pt is actively vomiting during assessment. Emesis is dark orange and clear. Pt reports diarrhea was black. Pt reports seeing black spots and reports dizziness. Pt reports hx of HTN and asthma. Pt reports getting gallbladder removed. Pt reports using heroin last night. Pt is alert and oriented x 4, RR even and unlabored, skin is warm and dry. Pt appears in NAD at this time. Assessment completed and pt updated on plan of care.  Call bell in reach.   Emergency Department Nursing Plan of Care  The Nursing Plan of Care is developed from the Nursing assessment and Emergency Department Attending provider initial evaluation.  The plan of care may be reviewed in the “ED Provider note”.  The Plan of Care was developed with the following considerations:  Patient / Family readiness to learn indicated by:Refer to Medical chart in [facility]  Persons(s) to be included in education: Refer to Medical chart in [facility]  Barriers to Learning/Limitations:Normal

## 2024-09-29 NOTE — ED NOTES
Discharge instructions were given to the patient by Rekha Novak RN.  The patient left the Emergency Department alert and oriented and in no acute distress with 4 prescription(s). The patient was encouraged to call or return to the ED for worsening issues or problems and was encouraged to schedule a follow up appointment for continuing care.  The patient verbalized understanding of discharge instructions and prescriptions; all questions were answered. The patient has no further concerns at this time.

## 2024-09-29 NOTE — ED NOTES
Provided pt with water to assess vomiting before giving PO meds. Pt began vomiting again. Provider notified. Medication held.

## 2024-11-04 ENCOUNTER — HOSPITAL ENCOUNTER (EMERGENCY)
Facility: HOSPITAL | Age: 49
Discharge: HOME | End: 2024-11-04
Attending: EMERGENCY MEDICINE
Payer: COMMERCIAL

## 2024-11-04 ENCOUNTER — APPOINTMENT (OUTPATIENT)
Facility: HOSPITAL | Age: 49
End: 2024-11-04
Payer: COMMERCIAL

## 2024-11-04 VITALS
HEART RATE: 87 BPM | RESPIRATION RATE: 20 BRPM | OXYGEN SATURATION: 100 % | BODY MASS INDEX: 26.36 KG/M2 | DIASTOLIC BLOOD PRESSURE: 78 MMHG | WEIGHT: 164 LBS | TEMPERATURE: 98.6 F | HEIGHT: 66 IN | SYSTOLIC BLOOD PRESSURE: 135 MMHG

## 2024-11-04 DIAGNOSIS — R45.1 RESTLESSNESS AND AGITATION: ICD-10-CM

## 2024-11-04 DIAGNOSIS — F14.920 COCAINE INTOXICATION WITHOUT COMPLICATION (HCC): ICD-10-CM

## 2024-11-04 DIAGNOSIS — R10.9 INTRACTABLE ABDOMINAL PAIN: Primary | ICD-10-CM

## 2024-11-04 DIAGNOSIS — E86.0 ACUTE DEHYDRATION: ICD-10-CM

## 2024-11-04 DIAGNOSIS — D25.9 UTERINE LEIOMYOMA, UNSPECIFIED LOCATION: ICD-10-CM

## 2024-11-04 LAB
ALBUMIN SERPL-MCNC: 4.8 G/DL (ref 3.5–5)
ALBUMIN/GLOB SERPL: 0.9 (ref 1.1–2.2)
ALP SERPL-CCNC: 100 U/L (ref 45–117)
ALT SERPL-CCNC: 33 U/L (ref 12–78)
AMPHET UR QL SCN: NEGATIVE
ANION GAP BLD CALC-SCNC: 10.5 MMOL/L (ref 10–20)
ANION GAP SERPL CALC-SCNC: 14 MMOL/L (ref 2–12)
APPEARANCE UR: CLEAR
AST SERPL-CCNC: 28 U/L (ref 15–37)
BACTERIA URNS QL MICRO: NEGATIVE /HPF
BARBITURATES UR QL SCN: NEGATIVE
BASOPHILS # BLD: 0.1 K/UL (ref 0–0.1)
BASOPHILS NFR BLD: 1 % (ref 0–1)
BENZODIAZ UR QL: POSITIVE
BILIRUB SERPL-MCNC: 0.8 MG/DL (ref 0.2–1)
BILIRUB UR QL: NEGATIVE
BUN SERPL-MCNC: 19 MG/DL (ref 6–20)
BUN/CREAT SERPL: 17 (ref 12–20)
CA-I BLD-MCNC: 1.21 MMOL/L (ref 1.15–1.33)
CALCIUM SERPL-MCNC: 11 MG/DL (ref 8.5–10.1)
CANNABINOIDS UR QL SCN: NEGATIVE
CHLORIDE BLD-SCNC: 103 MMOL/L (ref 98–107)
CHLORIDE SERPL-SCNC: 96 MMOL/L (ref 97–108)
CO2 BLD-SCNC: 27.5 MMOL/L (ref 21–32)
CO2 SERPL-SCNC: 27 MMOL/L (ref 21–32)
COCAINE UR QL SCN: POSITIVE
COLOR UR: ABNORMAL
CREAT BLD-MCNC: 0.82 MG/DL (ref 0.6–1.3)
CREAT SERPL-MCNC: 1.09 MG/DL (ref 0.55–1.02)
DIFFERENTIAL METHOD BLD: ABNORMAL
EOSINOPHIL # BLD: 0.1 K/UL (ref 0–0.4)
EOSINOPHIL NFR BLD: 0 % (ref 0–7)
EPITH CASTS URNS QL MICRO: ABNORMAL /LPF
ERYTHROCYTE [DISTWIDTH] IN BLOOD BY AUTOMATED COUNT: 12.8 % (ref 11.5–14.5)
ETHANOL SERPL-MCNC: <10 MG/DL (ref 0–0.08)
GLOBULIN SER CALC-MCNC: 5.1 G/DL (ref 2–4)
GLUCOSE BLD-MCNC: 129 MG/DL (ref 74–99)
GLUCOSE SERPL-MCNC: 125 MG/DL (ref 65–100)
GLUCOSE UR STRIP.AUTO-MCNC: NEGATIVE MG/DL
HCG UR QL: NEGATIVE
HCT VFR BLD AUTO: 45.6 % (ref 35–47)
HGB BLD-MCNC: 15.2 G/DL (ref 11.5–16)
HGB UR QL STRIP: NEGATIVE
IMM GRANULOCYTES # BLD AUTO: 0 K/UL (ref 0–0.04)
IMM GRANULOCYTES NFR BLD AUTO: 0 % (ref 0–0.5)
KETONES UR QL STRIP.AUTO: 15 MG/DL
LEUKOCYTE ESTERASE UR QL STRIP.AUTO: NEGATIVE
LIPASE SERPL-CCNC: 18 U/L (ref 13–75)
LYMPHOCYTES # BLD: 0.9 K/UL (ref 0.8–3.5)
LYMPHOCYTES NFR BLD: 8 % (ref 12–49)
Lab: ABNORMAL
MCH RBC QN AUTO: 28.8 PG (ref 26–34)
MCHC RBC AUTO-ENTMCNC: 33.3 G/DL (ref 30–36.5)
MCV RBC AUTO: 86.5 FL (ref 80–99)
METHADONE UR QL: NEGATIVE
MONOCYTES # BLD: 0.3 K/UL (ref 0–1)
MONOCYTES NFR BLD: 3 % (ref 5–13)
NEUTS SEG # BLD: 10 K/UL (ref 1.8–8)
NEUTS SEG NFR BLD: 88 % (ref 32–75)
NITRITE UR QL STRIP.AUTO: NEGATIVE
NRBC # BLD: 0 K/UL (ref 0–0.01)
NRBC BLD-RTO: 0 PER 100 WBC
OPIATES UR QL: NEGATIVE
PCP UR QL: NEGATIVE
PH UR STRIP: 6.5 (ref 5–8)
PLATELET # BLD AUTO: 355 K/UL (ref 150–400)
PMV BLD AUTO: 10.1 FL (ref 8.9–12.9)
POTASSIUM BLD-SCNC: 3.3 MMOL/L (ref 3.5–5.1)
POTASSIUM SERPL-SCNC: 3.2 MMOL/L (ref 3.5–5.1)
PROT SERPL-MCNC: 9.9 G/DL (ref 6.4–8.2)
PROT UR STRIP-MCNC: 30 MG/DL
RBC # BLD AUTO: 5.27 M/UL (ref 3.8–5.2)
RBC #/AREA URNS HPF: ABNORMAL /HPF (ref 0–5)
SERVICE CMNT-IMP: ABNORMAL
SODIUM BLD-SCNC: 141 MMOL/L (ref 136–145)
SODIUM SERPL-SCNC: 137 MMOL/L (ref 136–145)
SP GR UR REFRACTOMETRY: 1.01
URINE CULTURE IF INDICATED: ABNORMAL
UROBILINOGEN UR QL STRIP.AUTO: 1 EU/DL (ref 0.2–1)
WBC # BLD AUTO: 11.4 K/UL (ref 3.6–11)
WBC URNS QL MICRO: ABNORMAL /HPF (ref 0–4)

## 2024-11-04 PROCEDURE — 83690 ASSAY OF LIPASE: CPT

## 2024-11-04 PROCEDURE — 36415 COLL VENOUS BLD VENIPUNCTURE: CPT

## 2024-11-04 PROCEDURE — 2580000003 HC RX 258: Performed by: EMERGENCY MEDICINE

## 2024-11-04 PROCEDURE — 6360000002 HC RX W HCPCS: Performed by: EMERGENCY MEDICINE

## 2024-11-04 PROCEDURE — 82077 ASSAY SPEC XCP UR&BREATH IA: CPT

## 2024-11-04 PROCEDURE — 85025 COMPLETE CBC W/AUTO DIFF WBC: CPT

## 2024-11-04 PROCEDURE — 80307 DRUG TEST PRSMV CHEM ANLYZR: CPT

## 2024-11-04 PROCEDURE — 96375 TX/PRO/DX INJ NEW DRUG ADDON: CPT

## 2024-11-04 PROCEDURE — 80053 COMPREHEN METABOLIC PANEL: CPT

## 2024-11-04 PROCEDURE — 81001 URINALYSIS AUTO W/SCOPE: CPT

## 2024-11-04 PROCEDURE — 81025 URINE PREGNANCY TEST: CPT

## 2024-11-04 PROCEDURE — 74177 CT ABD & PELVIS W/CONTRAST: CPT

## 2024-11-04 PROCEDURE — 96374 THER/PROPH/DIAG INJ IV PUSH: CPT

## 2024-11-04 PROCEDURE — 6360000002 HC RX W HCPCS

## 2024-11-04 PROCEDURE — 6370000000 HC RX 637 (ALT 250 FOR IP): Performed by: EMERGENCY MEDICINE

## 2024-11-04 PROCEDURE — 6360000004 HC RX CONTRAST MEDICATION: Performed by: EMERGENCY MEDICINE

## 2024-11-04 PROCEDURE — 99285 EMERGENCY DEPT VISIT HI MDM: CPT

## 2024-11-04 PROCEDURE — 80047 BASIC METABLC PNL IONIZED CA: CPT

## 2024-11-04 RX ORDER — DIPHENHYDRAMINE HYDROCHLORIDE 50 MG/ML
25 INJECTION INTRAMUSCULAR; INTRAVENOUS
Status: DISCONTINUED | OUTPATIENT
Start: 2024-11-04 | End: 2024-11-04

## 2024-11-04 RX ORDER — MORPHINE SULFATE 4 MG/ML
4 INJECTION, SOLUTION INTRAMUSCULAR; INTRAVENOUS
Status: COMPLETED | OUTPATIENT
Start: 2024-11-04 | End: 2024-11-04

## 2024-11-04 RX ORDER — IOPAMIDOL 755 MG/ML
100 INJECTION, SOLUTION INTRAVASCULAR
Status: COMPLETED | OUTPATIENT
Start: 2024-11-04 | End: 2024-11-04

## 2024-11-04 RX ORDER — KETOROLAC TROMETHAMINE 30 MG/ML
30 INJECTION, SOLUTION INTRAMUSCULAR; INTRAVENOUS
Status: COMPLETED | OUTPATIENT
Start: 2024-11-04 | End: 2024-11-04

## 2024-11-04 RX ORDER — ONDANSETRON 2 MG/ML
4 INJECTION INTRAMUSCULAR; INTRAVENOUS
Status: COMPLETED | OUTPATIENT
Start: 2024-11-04 | End: 2024-11-04

## 2024-11-04 RX ORDER — DIPHENHYDRAMINE HYDROCHLORIDE 50 MG/ML
50 INJECTION INTRAMUSCULAR; INTRAVENOUS
Status: COMPLETED | OUTPATIENT
Start: 2024-11-04 | End: 2024-11-04

## 2024-11-04 RX ORDER — LORAZEPAM 2 MG/ML
INJECTION INTRAMUSCULAR
Status: COMPLETED
Start: 2024-11-04 | End: 2024-11-04

## 2024-11-04 RX ORDER — DIPHENHYDRAMINE HYDROCHLORIDE 50 MG/ML
INJECTION INTRAMUSCULAR; INTRAVENOUS
Status: COMPLETED
Start: 2024-11-04 | End: 2024-11-04

## 2024-11-04 RX ORDER — 0.9 % SODIUM CHLORIDE 0.9 %
1000 INTRAVENOUS SOLUTION INTRAVENOUS ONCE
Status: COMPLETED | OUTPATIENT
Start: 2024-11-04 | End: 2024-11-04

## 2024-11-04 RX ORDER — LORAZEPAM 2 MG/ML
1 INJECTION INTRAMUSCULAR ONCE
Status: DISCONTINUED | OUTPATIENT
Start: 2024-11-04 | End: 2024-11-04

## 2024-11-04 RX ORDER — DICYCLOMINE HCL 20 MG
20 TABLET ORAL 4 TIMES DAILY
Qty: 20 TABLET | Refills: 0 | Status: SHIPPED | OUTPATIENT
Start: 2024-11-04

## 2024-11-04 RX ORDER — POTASSIUM CHLORIDE 750 MG/1
40 TABLET, EXTENDED RELEASE ORAL ONCE
Status: COMPLETED | OUTPATIENT
Start: 2024-11-04 | End: 2024-11-04

## 2024-11-04 RX ORDER — ONDANSETRON 4 MG/1
4 TABLET, ORALLY DISINTEGRATING ORAL 3 TIMES DAILY PRN
Qty: 21 TABLET | Refills: 0 | Status: SHIPPED | OUTPATIENT
Start: 2024-11-04

## 2024-11-04 RX ORDER — LORAZEPAM 2 MG/ML
2 INJECTION INTRAMUSCULAR ONCE
Status: COMPLETED | OUTPATIENT
Start: 2024-11-04 | End: 2024-11-04

## 2024-11-04 RX ADMIN — SODIUM CHLORIDE 1000 ML: 9 INJECTION, SOLUTION INTRAVENOUS at 03:17

## 2024-11-04 RX ADMIN — LORAZEPAM 2 MG: 2 INJECTION INTRAMUSCULAR at 04:28

## 2024-11-04 RX ADMIN — DIPHENHYDRAMINE HYDROCHLORIDE 50 MG: 50 INJECTION INTRAMUSCULAR; INTRAVENOUS at 04:27

## 2024-11-04 RX ADMIN — ONDANSETRON 4 MG: 2 INJECTION INTRAMUSCULAR; INTRAVENOUS at 03:17

## 2024-11-04 RX ADMIN — IOPAMIDOL 100 ML: 755 INJECTION, SOLUTION INTRAVENOUS at 04:41

## 2024-11-04 RX ADMIN — KETOROLAC TROMETHAMINE 30 MG: 30 INJECTION, SOLUTION INTRAMUSCULAR at 03:17

## 2024-11-04 RX ADMIN — LORAZEPAM 2 MG: 2 INJECTION INTRAMUSCULAR; INTRAVENOUS at 04:28

## 2024-11-04 RX ADMIN — MORPHINE SULFATE 4 MG: 4 INJECTION INTRAVENOUS at 04:01

## 2024-11-04 RX ADMIN — POTASSIUM CHLORIDE 40 MEQ: 750 TABLET, EXTENDED RELEASE ORAL at 05:29

## 2024-11-04 ASSESSMENT — LIFESTYLE VARIABLES
HOW OFTEN DO YOU HAVE A DRINK CONTAINING ALCOHOL: NEVER
HOW MANY STANDARD DRINKS CONTAINING ALCOHOL DO YOU HAVE ON A TYPICAL DAY: PATIENT DOES NOT DRINK

## 2024-11-04 ASSESSMENT — PAIN SCALES - GENERAL
PAINLEVEL_OUTOF10: 10
PAINLEVEL_OUTOF10: 9

## 2024-11-04 ASSESSMENT — ENCOUNTER SYMPTOMS
SORE THROAT: 0
DIARRHEA: 0
NAUSEA: 1
RHINORRHEA: 0
SHORTNESS OF BREATH: 0
ABDOMINAL PAIN: 1
EYE PAIN: 0
VOMITING: 1
COUGH: 0

## 2024-11-04 ASSESSMENT — PAIN - FUNCTIONAL ASSESSMENT: PAIN_FUNCTIONAL_ASSESSMENT: 0-10

## 2024-11-04 ASSESSMENT — PAIN DESCRIPTION - ORIENTATION
ORIENTATION: RIGHT;LEFT;UPPER;LOWER
ORIENTATION: ANTERIOR

## 2024-11-04 ASSESSMENT — PAIN DESCRIPTION - LOCATION
LOCATION: ABDOMEN
LOCATION: ABDOMEN

## 2024-11-04 ASSESSMENT — PAIN DESCRIPTION - DESCRIPTORS: DESCRIPTORS: ACHING

## 2024-11-04 NOTE — ED TRIAGE NOTES
Pt reports via EMS to ED c/o RUQ and LLQ pain r/t previous hernia. Pt reports RUQ abd pain radiates to her back. Pt took heroin 3 hours ago. Pt reported N/V to EMS. Pt has hx of HTN and does not adhere to medication. BP elevated in route. Pt tearful, yelling and making incoherent noises in triage.

## 2024-11-04 NOTE — ED NOTES
This RN medicated patient when patient began speaking loudly, stating \" I wanna go home.\" Despite redirection, patient continued to state that she wanted to leave. Patient spoke with Torres Hook and patient agreed to staying for CT. Upon leaving the room, patient began yelling  that she wanted to leave. Patient then walked up to nurse's station stating that she wanted leave. This RN redirected patient to her room, however patient continued to stay that she does not want to stay. Torres HOOK came to bedside and is attempting to calm patient.

## 2024-11-04 NOTE — DISCHARGE INSTRUCTIONS
[facility] Substance Abuse Treatment Resources    [facility]  [address] [place] [address]       Appointments scheduled using triage protocol. Patients will be evaluated and referred to appropriate treatment. A  is usually assigned, but there is usually a waiting list. All [place] residents without financial resources may be referred to [facility]. Patients must bring proof that they are residents of the [facility] (utility bill, rent receipt, picture ID, etc.).   [phone]  Crisis: [phone]   [employer] - ALL [employer] SERVICE REFERRALS MUST GO THROUGH [facility]  [facility]  [address] [facility]    [facility]  [address] [facility]    [facility]  [address] [place] [address]     [phone]  Detox unit: [phone]   Human Resources, [facility]  [address] [facility]       Intakes are Monday, Wednesday, Thursday from 8 AM - 12 noon. Patients may walk in any day and speak with a counselor. Patient must bring a picture ID.   [phone]   [facility]  [address] [facility]       No detox available. Patient must be medically stable and able to work. Patient needs social security card and an ID. Patient does not need to be homeless.   [phone]   The [address]       Detoxification available. Patients must be medically-cleared to go to detox and must be free of benzodiazepines and barbituates. [facility] prefers if they also have Clonidine available to help them with their detox.   [phone]   [facility]  [address] [facility]       Anabaptist-based AA program available for men. Patients need to be able to work and follow rules. 90 days inpatient followed by 90 days in a shelter house.   [phone]   [employer] [facility] that hosts a number of AA and NA groups each week   [phone]   [employer]  [facility] [address]

## 2024-11-04 NOTE — ED NOTES
Discharge instructions were given to the patient by Rea ORTEGA.     The patient left the Emergency Department alert and oriented and in no acute distress with 2 prescriptions. The patient was encouraged to call or return to the ED for worsening issues or problems and was encouraged to schedule a follow up appointment for continuing care.     Ambulation assessment completed before discharge.  Pt left Emergency Department at expected ambulatory status with no ortho devices  Ortho device education: none    The patient verbalized understanding of discharge instructions and prescriptions, all questions were answered. The patient has no further concerns at this time.

## 2024-11-04 NOTE — ED NOTES
Patient continues to yell, make incoherent noises, and groan in room. This RN and another RN went to patient room. Patient stated, \"It hurts\", pointing to her LLQ. Patient reported that she was scared. This RN and another RN educated that the pain medicine has to take effect in order for her to feel relief.

## 2024-11-04 NOTE — ED NOTES
Patient asked this RN if she could have somebody stay with her. This RN educated that we could not have someone stay with her.

## 2024-11-04 NOTE — ED PROVIDER NOTES
EMERGENCY DEPARTMENT HISTORY AND PHYSICAL EXAM            Please note that this dictation was completed with the assistance of \"Dragon\", the computer voice recognition software. Quite often unanticipated grammatical, syntax, homophones, and other interpretive errors are inadvertently transcribed by the computer software. Please disregard these errors and any errors that have escaped final proofreading. Thank you.    Date of Evaluation: 11/04/24  Patient: Gemini Malcolm  Patient Age and Sex: 49 y.o. female   MRN: [id number]  CSN: [id number]  PCP: No primary care provider on file.    History of Present Illness     Chief Complaint   Patient presents with    Abdominal Pain    Drug Problem     History Provided By: Patient/family/EMS (if available)    History is limited by: Nothing     HPI: Gemini Malcolm, 49 y.o. female with past medical history as documented below presents to the ED with c/o of sudden onset of intractable abdominal pain, nausea and vomiting.  States that she did do heroin 2 hours ago.  Concerned that was laced something because of her history of hernia.  Patient uncooperative and yelling upon arrival.. Pt denies any other exacerbating or ameliorating factors. There are no other complaints, changes or physical findings pertinent to the HPI at this time.    Nursing notes were all reviewed and agreed with or any disagreements were addressed in the HPI.    Past History   Past Medical History:  Past Medical History:   Diagnosis Date    Asthma     Hypertension     Murmur     Seizures (HCC)        Past Surgical History:  Past Surgical History:   Procedure Laterality Date    CHOLECYSTECTOMY         Family History:   Family history reviewed and was non-contributory, unless specified below:  No family history on file.    Social History:  Social History     Tobacco Use    Smoking status: Every Day     Types: Cigarettes    Smokeless tobacco: Never   Substance Use Topics    Alcohol use: Yes    Drug use: Yes     Types:

## 2024-11-04 NOTE — ED NOTES
See triage note. Pt is alert and oriented x 4, RR even and unlabored, skin is warm and dry. Assesment completed and pt updated on plan of care.       Emergency Department Nursing Plan of Care       The Nursing Plan of Care is developed from the Nursing assessment and Emergency Department Attending provider initial evaluation.  The plan of care may be reviewed in the “ED Provider note”.    The Plan of Care was developed with the following considerations:   Patient / Family readiness to learn indicated by:verbalized understanding  Persons(s) to be included in education: patient  Barriers to Learning/Limitations:None    Signed     Rea Vance RN    11/4/2024   3:30 AM

## 2024-11-05 ENCOUNTER — HOSPITAL ENCOUNTER (EMERGENCY)
Facility: HOSPITAL | Age: 49
Discharge: HOME | End: 2024-11-05
Attending: EMERGENCY MEDICINE
Payer: COMMERCIAL

## 2024-11-05 VITALS
TEMPERATURE: 98.8 F | SYSTOLIC BLOOD PRESSURE: 137 MMHG | OXYGEN SATURATION: 100 % | WEIGHT: 164 LBS | HEIGHT: 66 IN | RESPIRATION RATE: 15 BRPM | BODY MASS INDEX: 26.36 KG/M2 | DIASTOLIC BLOOD PRESSURE: 88 MMHG | HEART RATE: 96 BPM

## 2024-11-05 DIAGNOSIS — E83.52 HYPERCALCEMIA: ICD-10-CM

## 2024-11-05 DIAGNOSIS — R11.2 NAUSEA AND VOMITING, UNSPECIFIED VOMITING TYPE: ICD-10-CM

## 2024-11-05 DIAGNOSIS — R10.84 GENERALIZED ABDOMINAL PAIN: Primary | ICD-10-CM

## 2024-11-05 PROCEDURE — 6360000002 HC RX W HCPCS: Performed by: EMERGENCY MEDICINE

## 2024-11-05 PROCEDURE — 96372 THER/PROPH/DIAG INJ SC/IM: CPT

## 2024-11-05 PROCEDURE — 99284 EMERGENCY DEPT VISIT MOD MDM: CPT

## 2024-11-05 RX ORDER — PROMETHAZINE HYDROCHLORIDE 25 MG/1
25 TABLET ORAL 3 TIMES DAILY PRN
Qty: 15 TABLET | Refills: 0 | Status: SHIPPED | OUTPATIENT
Start: 2024-11-05 | End: 2024-11-12

## 2024-11-05 RX ORDER — HYDROMORPHONE HYDROCHLORIDE 1 MG/ML
1 INJECTION, SOLUTION INTRAMUSCULAR; INTRAVENOUS; SUBCUTANEOUS
Status: COMPLETED | OUTPATIENT
Start: 2024-11-05 | End: 2024-11-05

## 2024-11-05 RX ORDER — DROPERIDOL 2.5 MG/ML
1.25 INJECTION, SOLUTION INTRAMUSCULAR; INTRAVENOUS ONCE
Status: COMPLETED | OUTPATIENT
Start: 2024-11-05 | End: 2024-11-05

## 2024-11-05 RX ORDER — DROPERIDOL 2.5 MG/ML
1.25 INJECTION, SOLUTION INTRAMUSCULAR; INTRAVENOUS EVERY 6 HOURS PRN
Status: DISCONTINUED | OUTPATIENT
Start: 2024-11-05 | End: 2024-11-05

## 2024-11-05 RX ORDER — PROMETHAZINE HYDROCHLORIDE 25 MG/1
25 TABLET ORAL 3 TIMES DAILY PRN
Qty: 15 TABLET | Refills: 0 | Status: SHIPPED | OUTPATIENT
Start: 2024-11-05 | End: 2024-11-05

## 2024-11-05 RX ADMIN — HYDROMORPHONE HYDROCHLORIDE 1 MG: 1 INJECTION, SOLUTION INTRAMUSCULAR; INTRAVENOUS; SUBCUTANEOUS at 15:01

## 2024-11-05 RX ADMIN — DROPERIDOL 1.25 MG: 2.5 INJECTION, SOLUTION INTRAMUSCULAR; INTRAVENOUS at 15:01

## 2024-11-05 ASSESSMENT — PAIN DESCRIPTION - LOCATION: LOCATION: ABDOMEN

## 2024-11-05 ASSESSMENT — ENCOUNTER SYMPTOMS
VOMITING: 1
SHORTNESS OF BREATH: 0
NAUSEA: 1
ABDOMINAL PAIN: 1

## 2024-11-05 ASSESSMENT — PAIN DESCRIPTION - ORIENTATION: ORIENTATION: LEFT;UPPER

## 2024-11-05 ASSESSMENT — PAIN SCALES - GENERAL: PAINLEVEL_OUTOF10: 10

## 2024-11-05 ASSESSMENT — PAIN - FUNCTIONAL ASSESSMENT: PAIN_FUNCTIONAL_ASSESSMENT: 0-10

## 2024-11-05 NOTE — ED NOTES
Pt presents to ED via EMS complaining of abdominal pain N/V/D since earlier today after using heroin intranasally. Pt also endorses an anterior headache. Pt is alert and oriented x 4, RR even and unlabored, skin is warm and dry. Pt appears in NAD at this time. Assessment completed and pt updated on plan of care.  Call bell in reach.   Emergency Department Nursing Plan of Care  The Nursing Plan of Care is developed from the Nursing assessment and Emergency Department Attending provider initial evaluation.  The plan of care may be reviewed in the “ED Provider note”.  The Plan of Care was developed with the following considerations:  Patient / Family readiness to learn indicated by:Refer to Medical chart in [facility]  Persons(s) to be included in education: Refer to Medical chart in [facility]  Barriers to Learning/Limitations:Normal

## 2024-11-05 NOTE — ED NOTES
Discharge instructions were given to the patient by Sea Ramírez RN  .  The patient left the Emergency Department alert and oriented and in no acute distress with 1 prescription(s). The patient was encouraged to call or return to the ED for worsening issues or problems and was encouraged to schedule a follow up appointment for continuing care.  The patient verbalized understanding of discharge instructions and prescriptions; all questions were answered. The patient has no further concerns at this time.

## 2024-11-05 NOTE — DISCHARGE INSTRUCTIONS
The cause of your symptoms is not entirely clear.  I would recommend that you avoid any drugs or alcohol, and make sure to talk to the doctor about your high calcium levels if you return to the hospital.  Occasionally high calcium levels can cause abdominal pain, and can be due to many causes including problems of the parathyroid glands or other issues.    Local Primary Care Physicians  [facility] [phone]  MD Aaron Rodriges MD Brent Logie, MD [facility] [phone]  Carmella Brown, [facility]  MD Lidia Diaz MD Allen Tsui, MD   [facility] [phone]  MD Papito Bobo MD [facility] [phone]  MD Sandoval Polo MD William Noller, MD Michael Sheehan, MD   [facility] [phone]  MD Jonny Smith Jr, MD Brenda Sawyer, NP [facility] [phone]  MD Garrett Cruz MD Karen Kirby, MD Richard Overmeyer, MD Scott Mak MD Robert Rolfes Jr, MD   [facility] [phone]  Garrett Ortiz MD [facility] [phone]  MD Kat Arias, NP  MD Hima Valerio MD Ian Shupack, MD Kevin Sahli, MD Laura Burijon, MD   [facility] [phone]  MD Cesia Nicholson, P  Genia Mcfarland, NP  MD Vlad Junior MD Jethro Piland, MD Kenneth Simpson, MD [facility] [phone]  MD Bon Akhtar MD Navleen Kaur, MD David Kelly, MD Jason Lee, MD   [facility] [phone]  MD Chitra Ford MD [facility] [phone]  MD Jammie Crawford MD Charles Sparrow, MD   [facility] [phone]  MD Sharon Paige MD Mark Petrizzi, MD Michael Petrizzi, MD Gregory

## 2024-11-05 NOTE — ED TRIAGE NOTES
Pt presents ambulatory to ED complaining of LUQ abdominal pain and vomiting after patient used Heroin 5-6 hours ago. Pt states she thinks that it was \"bad\" heroin and reports symptoms began after drug use.

## 2024-11-05 NOTE — ED PROVIDER NOTES
EMERGENCY DEPARTMENT HISTORY AND PHYSICAL EXAM    Date: 11/5/2024  Patient Name: Gemini Malcolm  Patient Age and Sex: 49 y.o. female  MRN:  [id number]  [facility]:  [id number]    History of Present Illness     Chief Complaint   Patient presents with    Abdominal Pain    Vomiting       History Provided By: Patient    Ability to gather history was limited by:     HPI: Gemini Malcolm, 49 y.o. female   Complains of nausea and vomiting and left lower quadrant abdominal pain.  Symptoms have been intermittent for few weeks and she has had multiple ED visits including yesterday for the same symptoms.  No fevers or significant diarrhea.  She has a history of prior cholecystectomy.  She denies marijuana use or alcohol use.      Tobacco Use      Smoking status: Every Day        Types: Cigarettes      Smokeless tobacco: Never     Past History   The patient's medical, surgical, and social history were reviewed by me today.    Current Medications:  No current facility-administered medications on file prior to encounter.     Current Outpatient Medications on File Prior to Encounter   Medication Sig Dispense Refill    dicyclomine (BENTYL) 20 MG tablet Take 1 tablet by mouth 4 times daily 20 tablet 0    ondansetron (ZOFRAN-ODT) 4 MG disintegrating tablet Take 1 tablet by mouth 3 times daily as needed for Nausea or Vomiting 21 tablet 0    cloNIDine (CATAPRES) 0.2 MG/24HR PTWK Place 1 patch onto the skin once a week 4 patch 0    dicyclomine (BENTYL) 20 MG tablet Take 1 tablet by mouth every 4-6 hours as needed (abdominal pain) 20 tablet 0    ondansetron (ZOFRAN-ODT) 4 MG disintegrating tablet Take 1 tablet by mouth 3 times daily as needed for Nausea or Vomiting 21 tablet 0       Past Medical History:   Diagnosis Date    Asthma     Hypertension     Murmur     Seizures (HCC)        Past Surgical History:   Procedure Laterality Date    CHOLECYSTECTOMY         Social History     Tobacco Use    Smoking status: Every Day     Types: Cigarettes

## 2024-12-15 ENCOUNTER — HOSPITAL ENCOUNTER (EMERGENCY)
Facility: HOSPITAL | Age: 49
Discharge: HOME | End: 2024-12-15
Attending: EMERGENCY MEDICINE
Payer: MEDICARE

## 2024-12-15 ENCOUNTER — APPOINTMENT (OUTPATIENT)
Facility: HOSPITAL | Age: 49
End: 2024-12-15
Payer: MEDICARE

## 2024-12-15 VITALS
DIASTOLIC BLOOD PRESSURE: 82 MMHG | RESPIRATION RATE: 18 BRPM | TEMPERATURE: 98.5 F | HEART RATE: 83 BPM | WEIGHT: 163.2 LBS | SYSTOLIC BLOOD PRESSURE: 167 MMHG | BODY MASS INDEX: 26.34 KG/M2 | OXYGEN SATURATION: 100 %

## 2024-12-15 DIAGNOSIS — I16.0 HYPERTENSIVE URGENCY: ICD-10-CM

## 2024-12-15 DIAGNOSIS — M54.50 ACUTE MIDLINE LOW BACK PAIN WITHOUT SCIATICA: Primary | ICD-10-CM

## 2024-12-15 DIAGNOSIS — R52 INADEQUATE PAIN CONTROL: ICD-10-CM

## 2024-12-15 DIAGNOSIS — F11.10 HEROIN ABUSE (HCC): ICD-10-CM

## 2024-12-15 DIAGNOSIS — R11.0 NAUSEA WITHOUT VOMITING: ICD-10-CM

## 2024-12-15 LAB
ALBUMIN SERPL-MCNC: 4.2 G/DL (ref 3.5–5)
ALBUMIN/GLOB SERPL: 1 (ref 1.1–2.2)
ALP SERPL-CCNC: 78 U/L (ref 45–117)
ALT SERPL-CCNC: 27 U/L (ref 12–78)
AMPHET UR QL SCN: NEGATIVE
ANION GAP SERPL CALC-SCNC: 11 MMOL/L (ref 2–12)
APPEARANCE UR: CLEAR
AST SERPL-CCNC: 27 U/L (ref 15–37)
BACTERIA URNS QL MICRO: NEGATIVE /HPF
BARBITURATES UR QL SCN: NEGATIVE
BASOPHILS # BLD: 0.1 K/UL (ref 0–0.1)
BASOPHILS NFR BLD: 1 % (ref 0–1)
BENZODIAZ UR QL: NEGATIVE
BILIRUB SERPL-MCNC: 0.5 MG/DL (ref 0.2–1)
BILIRUB UR QL: NEGATIVE
BUN SERPL-MCNC: 19 MG/DL (ref 6–20)
BUN/CREAT SERPL: 24 (ref 12–20)
CALCIUM SERPL-MCNC: 10.1 MG/DL (ref 8.5–10.1)
CANNABINOIDS UR QL SCN: NEGATIVE
CHLORIDE SERPL-SCNC: 99 MMOL/L (ref 97–108)
CO2 SERPL-SCNC: 29 MMOL/L (ref 21–32)
COCAINE UR QL SCN: NEGATIVE
COLOR UR: ABNORMAL
CREAT SERPL-MCNC: 0.78 MG/DL (ref 0.55–1.02)
DIFFERENTIAL METHOD BLD: ABNORMAL
EOSINOPHIL # BLD: 0.1 K/UL (ref 0–0.4)
EOSINOPHIL NFR BLD: 1 % (ref 0–7)
EPITH CASTS URNS QL MICRO: ABNORMAL /LPF
ERYTHROCYTE [DISTWIDTH] IN BLOOD BY AUTOMATED COUNT: 12.5 % (ref 11.5–14.5)
GLOBULIN SER CALC-MCNC: 4.3 G/DL (ref 2–4)
GLUCOSE SERPL-MCNC: 103 MG/DL (ref 65–100)
GLUCOSE UR STRIP.AUTO-MCNC: NEGATIVE MG/DL
HCG UR QL: NEGATIVE
HCT VFR BLD AUTO: 41.8 % (ref 35–47)
HGB BLD-MCNC: 14.1 G/DL (ref 11.5–16)
HGB UR QL STRIP: ABNORMAL
IMM GRANULOCYTES # BLD AUTO: 0 K/UL (ref 0–0.04)
IMM GRANULOCYTES NFR BLD AUTO: 0 % (ref 0–0.5)
KETONES UR QL STRIP.AUTO: 15 MG/DL
LEUKOCYTE ESTERASE UR QL STRIP.AUTO: NEGATIVE
LIPASE SERPL-CCNC: 23 U/L (ref 13–75)
LYMPHOCYTES # BLD: 1.3 K/UL (ref 0.8–3.5)
LYMPHOCYTES NFR BLD: 17 % (ref 12–49)
Lab: NORMAL
MCH RBC QN AUTO: 29.1 PG (ref 26–34)
MCHC RBC AUTO-ENTMCNC: 33.7 G/DL (ref 30–36.5)
MCV RBC AUTO: 86.2 FL (ref 80–99)
METHADONE UR QL: NEGATIVE
MONOCYTES # BLD: 0.3 K/UL (ref 0–1)
MONOCYTES NFR BLD: 4 % (ref 5–13)
NEUTS SEG # BLD: 5.5 K/UL (ref 1.8–8)
NEUTS SEG NFR BLD: 77 % (ref 32–75)
NITRITE UR QL STRIP.AUTO: NEGATIVE
NRBC # BLD: 0 K/UL (ref 0–0.01)
NRBC BLD-RTO: 0 PER 100 WBC
OPIATES UR QL: NEGATIVE
PCP UR QL: NEGATIVE
PH UR STRIP: 5.5 (ref 5–8)
PLATELET # BLD AUTO: 318 K/UL (ref 150–400)
PMV BLD AUTO: 10.5 FL (ref 8.9–12.9)
POTASSIUM SERPL-SCNC: 3.7 MMOL/L (ref 3.5–5.1)
PROT SERPL-MCNC: 8.5 G/DL (ref 6.4–8.2)
PROT UR STRIP-MCNC: ABNORMAL MG/DL
RBC # BLD AUTO: 4.85 M/UL (ref 3.8–5.2)
RBC #/AREA URNS HPF: ABNORMAL /HPF (ref 0–5)
SODIUM SERPL-SCNC: 139 MMOL/L (ref 136–145)
SP GR UR REFRACTOMETRY: 1.02
URINE CULTURE IF INDICATED: ABNORMAL
UROBILINOGEN UR QL STRIP.AUTO: 1 EU/DL (ref 0.2–1)
WBC # BLD AUTO: 7.3 K/UL (ref 3.6–11)
WBC URNS QL MICRO: ABNORMAL /HPF (ref 0–4)

## 2024-12-15 PROCEDURE — 83690 ASSAY OF LIPASE: CPT

## 2024-12-15 PROCEDURE — 81025 URINE PREGNANCY TEST: CPT

## 2024-12-15 PROCEDURE — 81001 URINALYSIS AUTO W/SCOPE: CPT

## 2024-12-15 PROCEDURE — 80053 COMPREHEN METABOLIC PANEL: CPT

## 2024-12-15 PROCEDURE — 99284 EMERGENCY DEPT VISIT MOD MDM: CPT

## 2024-12-15 PROCEDURE — 36415 COLL VENOUS BLD VENIPUNCTURE: CPT

## 2024-12-15 PROCEDURE — 6360000002 HC RX W HCPCS: Performed by: EMERGENCY MEDICINE

## 2024-12-15 PROCEDURE — 85025 COMPLETE CBC W/AUTO DIFF WBC: CPT

## 2024-12-15 PROCEDURE — 96374 THER/PROPH/DIAG INJ IV PUSH: CPT

## 2024-12-15 PROCEDURE — 74176 CT ABD & PELVIS W/O CONTRAST: CPT

## 2024-12-15 PROCEDURE — 6370000000 HC RX 637 (ALT 250 FOR IP): Performed by: EMERGENCY MEDICINE

## 2024-12-15 PROCEDURE — 80307 DRUG TEST PRSMV CHEM ANLYZR: CPT

## 2024-12-15 RX ORDER — LIDOCAINE 50 MG/G
1 PATCH TOPICAL DAILY
Qty: 10 PATCH | Refills: 0 | Status: SHIPPED | OUTPATIENT
Start: 2024-12-15 | End: 2024-12-25

## 2024-12-15 RX ORDER — CYCLOBENZAPRINE HCL 5 MG
5 TABLET ORAL 3 TIMES DAILY PRN
Qty: 15 TABLET | Refills: 0 | Status: SHIPPED | OUTPATIENT
Start: 2024-12-15 | End: 2024-12-25

## 2024-12-15 RX ORDER — CYCLOBENZAPRINE HCL 10 MG
10 TABLET ORAL ONCE
Status: COMPLETED | OUTPATIENT
Start: 2024-12-15 | End: 2024-12-15

## 2024-12-15 RX ORDER — LIDOCAINE 4 G/G
1 PATCH TOPICAL
Status: DISCONTINUED | OUTPATIENT
Start: 2024-12-15 | End: 2024-12-15 | Stop reason: HOSPADM

## 2024-12-15 RX ORDER — KETOROLAC TROMETHAMINE 30 MG/ML
30 INJECTION, SOLUTION INTRAMUSCULAR; INTRAVENOUS ONCE
Status: DISCONTINUED | OUTPATIENT
Start: 2024-12-15 | End: 2024-12-15

## 2024-12-15 RX ORDER — HALOPERIDOL 5 MG/ML
2.5 INJECTION INTRAMUSCULAR ONCE
Status: COMPLETED | OUTPATIENT
Start: 2024-12-15 | End: 2024-12-15

## 2024-12-15 RX ORDER — HALOPERIDOL 5 MG/ML
5 INJECTION INTRAMUSCULAR ONCE
Status: DISCONTINUED | OUTPATIENT
Start: 2024-12-15 | End: 2024-12-15

## 2024-12-15 RX ORDER — METHOCARBAMOL 500 MG/1
1000 TABLET, FILM COATED ORAL ONCE
Status: DISCONTINUED | OUTPATIENT
Start: 2024-12-15 | End: 2024-12-15

## 2024-12-15 RX ORDER — NAPROXEN 500 MG/1
500 TABLET ORAL 2 TIMES DAILY
Qty: 60 TABLET | Refills: 0 | Status: SHIPPED | OUTPATIENT
Start: 2024-12-15

## 2024-12-15 RX ORDER — KETOROLAC TROMETHAMINE 30 MG/ML
30 INJECTION, SOLUTION INTRAMUSCULAR; INTRAVENOUS
Status: COMPLETED | OUTPATIENT
Start: 2024-12-15 | End: 2024-12-15

## 2024-12-15 RX ADMIN — CYCLOBENZAPRINE 10 MG: 10 TABLET, FILM COATED ORAL at 06:24

## 2024-12-15 RX ADMIN — HALOPERIDOL LACTATE 2.5 MG: 5 INJECTION, SOLUTION INTRAMUSCULAR at 03:29

## 2024-12-15 RX ADMIN — KETOROLAC TROMETHAMINE 30 MG: 30 INJECTION, SOLUTION INTRAMUSCULAR at 04:39

## 2024-12-15 ASSESSMENT — PAIN DESCRIPTION - LOCATION
LOCATION: FLANK
LOCATION: FLANK

## 2024-12-15 ASSESSMENT — PAIN DESCRIPTION - ORIENTATION
ORIENTATION: RIGHT;LEFT
ORIENTATION: LEFT;RIGHT

## 2024-12-15 ASSESSMENT — PAIN SCALES - GENERAL
PAINLEVEL_OUTOF10: 8
PAINLEVEL_OUTOF10: 10

## 2024-12-15 ASSESSMENT — PAIN - FUNCTIONAL ASSESSMENT: PAIN_FUNCTIONAL_ASSESSMENT: 0-10

## 2024-12-15 ASSESSMENT — PAIN DESCRIPTION - DESCRIPTORS
DESCRIPTORS: ACHING
DESCRIPTORS: ACHING

## 2024-12-15 NOTE — ED NOTES
Discharge instructions were given to the patient by SHANNON Yu.     The patient left the Emergency Department alert and oriented and in no acute distress with 3 prescriptions. The patient was encouraged to call or return to the ED for worsening issues or problems and was encouraged to schedule a follow up appointment for continuing care.     Ambulation assessment completed before discharge.  Pt left Emergency Department ambulating at baseline with no ortho devices  Ortho device education: none    The patient verbalized understanding of discharge instructions and prescriptions, all questions were answered. The patient has no further concerns at this time.

## 2024-12-15 NOTE — DISCHARGE INSTRUCTIONS
Negative NEG      Benzodiazepines, Urine Negative NEG      Cocaine, Urine Negative NEG      Methadone, Urine Negative NEG      Opiates, Urine Negative NEG      Phencyclidine, Urine Negative NEG      THC, TH-Cannabinol, Urine Negative NEG      Comments: (NOTE)    Comprehensive Metabolic Panel    Collection Time: 12/15/24  3:28 AM   Result Value Ref Range    Sodium 139 136 - 145 mmol/L    Potassium 3.7 3.5 - 5.1 mmol/L    Chloride 99 97 - 108 mmol/L    CO2 29 21 - 32 mmol/L    Anion Gap 11 2 - 12 mmol/L    Glucose 103 (H) 65 - 100 mg/dL    BUN 19 6 - 20 MG/DL    Creatinine 0.78 0.55 - 1.02 MG/DL    BUN/Creatinine Ratio 24 (H) 12 - 20      Est, Glom Filt Rate >90 >60 ml/min/1.73m2    Calcium 10.1 8.5 - 10.1 MG/DL    Total Bilirubin 0.5 0.2 - 1.0 MG/DL    ALT 27 12 - 78 U/L    AST 27 15 - 37 U/L    Alk Phosphatase 78 45 - 117 U/L    Total Protein 8.5 (H) 6.4 - 8.2 g/dL    Albumin 4.2 3.5 - 5.0 g/dL    Globulin 4.3 (H) 2.0 - 4.0 g/dL    Albumin/Globulin Ratio 1.0 (L) 1.1 - 2.2     Lipase    Collection Time: 12/15/24  3:28 AM   Result Value Ref Range    Lipase 23 13 - 75 U/L   CBC with Auto Differential    Collection Time: 12/15/24  3:58 AM   Result Value Ref Range    WBC 7.3 3.6 - 11.0 K/uL    RBC 4.85 3.80 - 5.20 M/uL    Hemoglobin 14.1 11.5 - 16.0 g/dL    Hematocrit 41.8 35.0 - 47.0 %    MCV 86.2 80.0 - 99.0 FL    MCH 29.1 26.0 - 34.0 PG    MCHC 33.7 30.0 - 36.5 g/dL    RDW 12.5 11.5 - 14.5 %    Platelets 318 150 - 400 K/uL    MPV 10.5 8.9 - 12.9 FL    Nucleated RBCs 0.0 0  WBC    nRBC 0.00 0.00 - 0.01 K/uL    Neutrophils % 77 (H) 32 - 75 %    Lymphocytes % 17 12 - 49 %    Monocytes % 4 (L) 5 - 13 %    Eosinophils % 1 0 - 7 %    Basophils % 1 0 - 1 %    Immature Granulocytes % 0 0.0 - 0.5 %    Neutrophils Absolute 5.5 1.8 - 8.0 K/UL    Lymphocytes Absolute 1.3 0.8 - 3.5 K/UL    Monocytes Absolute 0.3 0.0 - 1.0 K/UL    Eosinophils Absolute 0.1 0.0 - 0.4 K/UL    Basophils Absolute 0.1 0.0 - 0.1 K/UL    Immature

## 2024-12-15 NOTE — ED NOTES
Pt declines to ambulate per MD order. MD is made aware. Pt reports pain now is 8/10 on numeric pain scale.

## 2024-12-15 NOTE — ED NOTES
See triage note.     Pt is alert and oriented x 4, RR even and unlabored, skin is warm and dry. Assesment completed and pt updated on plan of care.       Emergency Department Nursing Plan of Care       The Nursing Plan of Care is developed from the Nursing assessment and Emergency Department Attending provider initial evaluation.  The plan of care may be reviewed in the “ED Provider note”.    The Plan of Care was developed with the following considerations:   Patient / Family readiness to learn indicated by:verbalized understanding  Persons(s) to be included in education: patient  Barriers to Learning/Limitations:None    Signed     Duke Padilla RN    12/15/2024   3:23 AM

## 2024-12-15 NOTE — ED TRIAGE NOTES
Pt presents with RAA to ED with cc of bilateral flank pain x yesterday. Pt states, it feels like I have a UTI. Denies hx of kidney stones. Pt has urinary frequency. Pt states, \"been throwing up all day.\" EMS administered 4mg of zofran and 15ml toradol. Pt admits to using heroin 5 hrs PTA.      Pt is alert and oriented x 4

## 2024-12-15 NOTE — ED PROVIDER NOTES
interpreted all available laboratory results   Recent Results (from the past 24 hour(s))   POC Pregnancy Urine Qual    Collection Time: 12/15/24  3:24 AM   Result Value Ref Range    Preg Test, Ur Negative NEG     Urinalysis with Reflex to Culture    Collection Time: 12/15/24  3:28 AM    Specimen: Urine   Result Value Ref Range    Color, UA YELLOW/STRAW      Appearance CLEAR CLEAR      Specific Gravity, UA 1.020      pH, Urine 5.5 5.0 - 8.0      Protein, UA TRACE (A) NEG mg/dL    Glucose, Ur Negative NEG mg/dL    Ketones, Urine 15 (A) NEG mg/dL    Bilirubin, Urine Negative NEG      Blood, Urine MODERATE (A) NEG      Urobilinogen, Urine 1.0 0.2 - 1.0 EU/dL    Nitrite, Urine Negative NEG      Leukocyte Esterase, Urine Negative NEG      WBC, UA 0-4 0 - 4 /hpf    RBC, UA 10-20 0 - 5 /hpf    Epithelial Cells, UA FEW FEW /lpf    BACTERIA, URINE Negative NEG /hpf    Urine Culture if Indicated CULTURE NOT INDICATED BY UA RESULT CNI     Urine Drug Screen    Collection Time: 12/15/24  3:28 AM   Result Value Ref Range    Amphetamine, Urine Negative NEG      Barbiturates, Urine Negative NEG      Benzodiazepines, Urine Negative NEG      Cocaine, Urine Negative NEG      Methadone, Urine Negative NEG      Opiates, Urine Negative NEG      Phencyclidine, Urine Negative NEG      THC, TH-Cannabinol, Urine Negative NEG      Comments: (NOTE)    Comprehensive Metabolic Panel    Collection Time: 12/15/24  3:28 AM   Result Value Ref Range    Sodium 139 136 - 145 mmol/L    Potassium 3.7 3.5 - 5.1 mmol/L    Chloride 99 97 - 108 mmol/L    CO2 29 21 - 32 mmol/L    Anion Gap 11 2 - 12 mmol/L    Glucose 103 (H) 65 - 100 mg/dL    BUN 19 6 - 20 MG/DL    Creatinine 0.78 0.55 - 1.02 MG/DL    BUN/Creatinine Ratio 24 (H) 12 - 20      Est, Glom Filt Rate >90 >60 ml/min/1.73m2    Calcium 10.1 8.5 - 10.1 MG/DL    Total Bilirubin 0.5 0.2 - 1.0 MG/DL    ALT 27 12 - 78 U/L    AST 27 15 - 37 U/L    Alk Phosphatase 78 45 - 117 U/L    Total Protein 8.5 (H) 6.4

## 2024-12-20 ENCOUNTER — APPOINTMENT (OUTPATIENT)
Facility: HOSPITAL | Age: 49
End: 2024-12-20
Payer: MEDICARE

## 2024-12-20 ENCOUNTER — HOSPITAL ENCOUNTER (EMERGENCY)
Facility: HOSPITAL | Age: 49
Discharge: HOME | End: 2024-12-20
Payer: MEDICARE

## 2024-12-20 VITALS
TEMPERATURE: 98.4 F | SYSTOLIC BLOOD PRESSURE: 123 MMHG | WEIGHT: 163 LBS | OXYGEN SATURATION: 99 % | BODY MASS INDEX: 26.2 KG/M2 | RESPIRATION RATE: 18 BRPM | DIASTOLIC BLOOD PRESSURE: 88 MMHG | HEART RATE: 89 BPM | HEIGHT: 66 IN

## 2024-12-20 DIAGNOSIS — E87.6 HYPOKALEMIA: ICD-10-CM

## 2024-12-20 DIAGNOSIS — N85.00 ENDOMETRIAL HYPERPLASIA: ICD-10-CM

## 2024-12-20 DIAGNOSIS — D25.2 SUBSEROUS LEIOMYOMA OF UTERUS: ICD-10-CM

## 2024-12-20 DIAGNOSIS — N73.0 PID (ACUTE PELVIC INFLAMMATORY DISEASE): Primary | ICD-10-CM

## 2024-12-20 DIAGNOSIS — N83.201 CYST OF RIGHT OVARY: ICD-10-CM

## 2024-12-20 LAB
ALBUMIN SERPL-MCNC: 4.3 G/DL (ref 3.5–5)
ALBUMIN/GLOB SERPL: 1 (ref 1.1–2.2)
ALP SERPL-CCNC: 84 U/L (ref 45–117)
ALT SERPL-CCNC: 27 U/L (ref 12–78)
ANION GAP SERPL CALC-SCNC: 11 MMOL/L (ref 2–12)
APPEARANCE UR: CLEAR
AST SERPL-CCNC: 24 U/L (ref 15–37)
BACTERIA URNS QL MICRO: NEGATIVE /HPF
BASOPHILS # BLD: 0.1 K/UL (ref 0–0.1)
BASOPHILS NFR BLD: 1 % (ref 0–1)
BILIRUB SERPL-MCNC: 0.6 MG/DL (ref 0.2–1)
BILIRUB UR QL CFM: NEGATIVE
BUN SERPL-MCNC: 10 MG/DL (ref 6–20)
BUN/CREAT SERPL: 11 (ref 12–20)
CALCIUM SERPL-MCNC: 10.4 MG/DL (ref 8.5–10.1)
CHLORIDE SERPL-SCNC: 94 MMOL/L (ref 97–108)
CLUE CELLS VAG QL WET PREP: NORMAL
CO2 SERPL-SCNC: 31 MMOL/L (ref 21–32)
COLOR UR: ABNORMAL
CREAT SERPL-MCNC: 0.9 MG/DL (ref 0.55–1.02)
CRP SERPL-MCNC: <0.29 MG/DL (ref 0–0.3)
DIFFERENTIAL METHOD BLD: ABNORMAL
EOSINOPHIL # BLD: 0.1 K/UL (ref 0–0.4)
EOSINOPHIL NFR BLD: 1 % (ref 0–7)
EPITH CASTS URNS QL MICRO: ABNORMAL /LPF
ERYTHROCYTE [DISTWIDTH] IN BLOOD BY AUTOMATED COUNT: 11.9 % (ref 11.5–14.5)
ERYTHROCYTE [SEDIMENTATION RATE] IN BLOOD: 6 MM/HR (ref 0–20)
FLUAV RNA SPEC QL NAA+PROBE: NOT DETECTED
FLUBV RNA SPEC QL NAA+PROBE: NOT DETECTED
GLOBULIN SER CALC-MCNC: 4.4 G/DL (ref 2–4)
GLUCOSE SERPL-MCNC: 99 MG/DL (ref 65–100)
GLUCOSE UR STRIP.AUTO-MCNC: NEGATIVE MG/DL
HCG UR QL: NEGATIVE
HCT VFR BLD AUTO: 43.1 % (ref 35–47)
HGB BLD-MCNC: 14.8 G/DL (ref 11.5–16)
HGB UR QL STRIP: NEGATIVE
IMM GRANULOCYTES # BLD AUTO: 0.1 K/UL (ref 0–0.04)
IMM GRANULOCYTES NFR BLD AUTO: 1 % (ref 0–0.5)
KETONES UR QL STRIP.AUTO: ABNORMAL MG/DL
LACTATE BLD-SCNC: 1.3 MMOL/L (ref 0.4–2)
LEUKOCYTE ESTERASE UR QL STRIP.AUTO: NEGATIVE
LYMPHOCYTES # BLD: 2.8 K/UL (ref 0.8–3.5)
LYMPHOCYTES NFR BLD: 30 % (ref 12–49)
MCH RBC QN AUTO: 29.2 PG (ref 26–34)
MCHC RBC AUTO-ENTMCNC: 34.3 G/DL (ref 30–36.5)
MCV RBC AUTO: 85 FL (ref 80–99)
MONOCYTES # BLD: 0.6 K/UL (ref 0–1)
MONOCYTES NFR BLD: 6 % (ref 5–13)
MUCOUS THREADS URNS QL MICRO: ABNORMAL /LPF
NEUTS SEG # BLD: 5.7 K/UL (ref 1.8–8)
NEUTS SEG NFR BLD: 61 % (ref 32–75)
NITRITE UR QL STRIP.AUTO: NEGATIVE
NRBC # BLD: 0 K/UL (ref 0–0.01)
NRBC BLD-RTO: 0 PER 100 WBC
PH UR STRIP: 6 (ref 5–8)
PLATELET # BLD AUTO: 365 K/UL (ref 150–400)
PMV BLD AUTO: 10.3 FL (ref 8.9–12.9)
POTASSIUM SERPL-SCNC: 2.9 MMOL/L (ref 3.5–5.1)
PROT SERPL-MCNC: 8.7 G/DL (ref 6.4–8.2)
PROT UR STRIP-MCNC: 30 MG/DL
RBC # BLD AUTO: 5.07 M/UL (ref 3.8–5.2)
RBC #/AREA URNS HPF: ABNORMAL /HPF (ref 0–5)
SARS-COV-2 RNA RESP QL NAA+PROBE: NOT DETECTED
SODIUM SERPL-SCNC: 136 MMOL/L (ref 136–145)
SOURCE: NORMAL
SP GR UR REFRACTOMETRY: 1.02
T VAGINALIS VAG QL WET PREP: NORMAL
URINE CULTURE IF INDICATED: ABNORMAL
UROBILINOGEN UR QL STRIP.AUTO: 1 EU/DL (ref 0.2–1)
WBC # BLD AUTO: 9.2 K/UL (ref 3.6–11)
WBC URNS QL MICRO: ABNORMAL /HPF (ref 0–4)
YEAST: NORMAL

## 2024-12-20 PROCEDURE — 87491 CHLMYD TRACH DNA AMP PROBE: CPT

## 2024-12-20 PROCEDURE — 81001 URINALYSIS AUTO W/SCOPE: CPT

## 2024-12-20 PROCEDURE — 6360000002 HC RX W HCPCS

## 2024-12-20 PROCEDURE — 99284 EMERGENCY DEPT VISIT MOD MDM: CPT

## 2024-12-20 PROCEDURE — 6370000000 HC RX 637 (ALT 250 FOR IP)

## 2024-12-20 PROCEDURE — 96365 THER/PROPH/DIAG IV INF INIT: CPT

## 2024-12-20 PROCEDURE — 2580000003 HC RX 258

## 2024-12-20 PROCEDURE — 81025 URINE PREGNANCY TEST: CPT

## 2024-12-20 PROCEDURE — 85652 RBC SED RATE AUTOMATED: CPT

## 2024-12-20 PROCEDURE — 85025 COMPLETE CBC W/AUTO DIFF WBC: CPT

## 2024-12-20 PROCEDURE — 83605 ASSAY OF LACTIC ACID: CPT

## 2024-12-20 PROCEDURE — 76856 US EXAM PELVIC COMPLETE: CPT

## 2024-12-20 PROCEDURE — 96366 THER/PROPH/DIAG IV INF ADDON: CPT

## 2024-12-20 PROCEDURE — 87591 N.GONORRHOEAE DNA AMP PROB: CPT

## 2024-12-20 PROCEDURE — 87210 SMEAR WET MOUNT SALINE/INK: CPT

## 2024-12-20 PROCEDURE — 71046 X-RAY EXAM CHEST 2 VIEWS: CPT

## 2024-12-20 PROCEDURE — 87636 SARSCOV2 & INF A&B AMP PRB: CPT

## 2024-12-20 PROCEDURE — 86140 C-REACTIVE PROTEIN: CPT

## 2024-12-20 PROCEDURE — 76830 TRANSVAGINAL US NON-OB: CPT

## 2024-12-20 PROCEDURE — 96375 TX/PRO/DX INJ NEW DRUG ADDON: CPT

## 2024-12-20 PROCEDURE — 80053 COMPREHEN METABOLIC PANEL: CPT

## 2024-12-20 PROCEDURE — 96367 TX/PROPH/DG ADDL SEQ IV INF: CPT

## 2024-12-20 PROCEDURE — 2500000003 HC RX 250 WO HCPCS

## 2024-12-20 RX ORDER — POTASSIUM CHLORIDE 750 MG/1
40 TABLET, EXTENDED RELEASE ORAL ONCE
Status: COMPLETED | OUTPATIENT
Start: 2024-12-20 | End: 2024-12-20

## 2024-12-20 RX ORDER — POTASSIUM CHLORIDE 7.45 MG/ML
10 INJECTION INTRAVENOUS
Status: DISPENSED | OUTPATIENT
Start: 2024-12-20 | End: 2024-12-20

## 2024-12-20 RX ORDER — IBUPROFEN 100 MG/5ML
10 SUSPENSION ORAL
Status: COMPLETED | OUTPATIENT
Start: 2024-12-20 | End: 2024-12-20

## 2024-12-20 RX ORDER — 0.9 % SODIUM CHLORIDE 0.9 %
1000 INTRAVENOUS SOLUTION INTRAVENOUS ONCE
Status: COMPLETED | OUTPATIENT
Start: 2024-12-20 | End: 2024-12-20

## 2024-12-20 RX ORDER — IBUPROFEN 600 MG/1
600 TABLET, FILM COATED ORAL 3 TIMES DAILY PRN
Qty: 30 TABLET | Refills: 0 | Status: SHIPPED | OUTPATIENT
Start: 2024-12-20

## 2024-12-20 RX ORDER — POTASSIUM CHLORIDE 750 MG/1
10 TABLET, EXTENDED RELEASE ORAL DAILY
Qty: 30 TABLET | Refills: 0 | Status: SHIPPED | OUTPATIENT
Start: 2024-12-20

## 2024-12-20 RX ORDER — METRONIDAZOLE 500 MG/100ML
500 INJECTION, SOLUTION INTRAVENOUS
Status: COMPLETED | OUTPATIENT
Start: 2024-12-20 | End: 2024-12-20

## 2024-12-20 RX ORDER — METRONIDAZOLE 500 MG/1
500 TABLET ORAL 2 TIMES DAILY
Qty: 28 TABLET | Refills: 0 | Status: SHIPPED | OUTPATIENT
Start: 2024-12-20 | End: 2025-01-03

## 2024-12-20 RX ORDER — DOXYCYCLINE HYCLATE 100 MG
100 TABLET ORAL 2 TIMES DAILY
Qty: 28 TABLET | Refills: 0 | Status: SHIPPED | OUTPATIENT
Start: 2024-12-20 | End: 2025-01-03

## 2024-12-20 RX ORDER — PROCHLORPERAZINE EDISYLATE 5 MG/ML
10 INJECTION INTRAMUSCULAR; INTRAVENOUS ONCE
Status: COMPLETED | OUTPATIENT
Start: 2024-12-20 | End: 2024-12-20

## 2024-12-20 RX ORDER — ONDANSETRON 4 MG/1
4 TABLET, FILM COATED ORAL 3 TIMES DAILY PRN
Qty: 30 TABLET | Refills: 0 | Status: SHIPPED | OUTPATIENT
Start: 2024-12-20

## 2024-12-20 RX ORDER — MORPHINE SULFATE 4 MG/ML
4 INJECTION, SOLUTION INTRAMUSCULAR; INTRAVENOUS
Status: COMPLETED | OUTPATIENT
Start: 2024-12-20 | End: 2024-12-20

## 2024-12-20 RX ORDER — ONDANSETRON 2 MG/ML
4 INJECTION INTRAMUSCULAR; INTRAVENOUS ONCE
Status: COMPLETED | OUTPATIENT
Start: 2024-12-20 | End: 2024-12-20

## 2024-12-20 RX ADMIN — METRONIDAZOLE 500 MG: 500 INJECTION, SOLUTION INTRAVENOUS at 19:31

## 2024-12-20 RX ADMIN — POTASSIUM CHLORIDE 40 MEQ: 750 TABLET, EXTENDED RELEASE ORAL at 22:52

## 2024-12-20 RX ADMIN — POTASSIUM CHLORIDE 10 MEQ: 7.45 INJECTION INTRAVENOUS at 20:26

## 2024-12-20 RX ADMIN — MORPHINE SULFATE 4 MG: 4 INJECTION INTRAVENOUS at 20:19

## 2024-12-20 RX ADMIN — PROCHLORPERAZINE EDISYLATE 10 MG: 5 INJECTION INTRAMUSCULAR; INTRAVENOUS at 19:26

## 2024-12-20 RX ADMIN — POTASSIUM CHLORIDE 10 MEQ: 7.45 INJECTION INTRAVENOUS at 21:37

## 2024-12-20 RX ADMIN — SODIUM CHLORIDE 1000 ML: 9 INJECTION, SOLUTION INTRAVENOUS at 17:55

## 2024-12-20 RX ADMIN — IBUPROFEN 740 MG: 100 SUSPENSION ORAL at 17:51

## 2024-12-20 RX ADMIN — ONDANSETRON 4 MG: 2 INJECTION, SOLUTION INTRAMUSCULAR; INTRAVENOUS at 17:54

## 2024-12-20 RX ADMIN — DOXYCYCLINE 100 MG: 100 INJECTION, POWDER, LYOPHILIZED, FOR SOLUTION INTRAVENOUS at 20:44

## 2024-12-20 ASSESSMENT — PAIN SCALES - GENERAL
PAINLEVEL_OUTOF10: 9
PAINLEVEL_OUTOF10: 9

## 2024-12-20 ASSESSMENT — PAIN DESCRIPTION - FREQUENCY: FREQUENCY: CONTINUOUS

## 2024-12-20 ASSESSMENT — PAIN DESCRIPTION - LOCATION
LOCATION: VAGINA
LOCATION: ABDOMEN

## 2024-12-20 ASSESSMENT — PAIN - FUNCTIONAL ASSESSMENT: PAIN_FUNCTIONAL_ASSESSMENT: 0-10

## 2024-12-20 ASSESSMENT — PAIN DESCRIPTION - PAIN TYPE: TYPE: ACUTE PAIN

## 2024-12-20 ASSESSMENT — PAIN DESCRIPTION - DESCRIPTORS: DESCRIPTORS: ACHING

## 2024-12-20 NOTE — ED TRIAGE NOTES
Reports abd pain and vomiting x4 days.  Also reports vaginal odor.  Also reports dry cough and chills

## 2024-12-20 NOTE — ED PROVIDER NOTES
[facility] EMERGENCY DEPT  EMERGENCY DEPARTMENT ENCOUNTER       Pt Name: Gemini Malcolm  MRN: [id number]  Birthdate 1975  Date of evaluation: 12/20/2024  Provider: BRANDON Kent CNP   PCP: No primary care provider on file.  Note Started:  4:12 PM EST 12/20/24     CHIEF COMPLAINT       Chief Complaint   Patient presents with    Abdominal Pain    Vomiting    Vaginal Odor    Chills        HISTORY OF PRESENT ILLNESS: 1 or more elements      History From: Patient  HPI Limitations: None     Gemini Malcolm is a 49 y.o. female who presents complaining of suprapubic abdominal pain, and vaginal odor x 4 days.  She denies vaginal discharge or rash.  She denies pregnancy or STD exposure.    Also complaining of dry cough, chills, nausea, vomiting, diarrhea and generalized fatigue x 4 days.  She denies sick contact.  Denies history of anemia, COPD or asthma.  She denies shortness of breath or chest pain.  Denies treatment prior to arrival to the ED.     Nursing Notes were all reviewed and agreed with or any disagreements were addressed in the HPI.     REVIEW OF SYSTEMS      Review of Systems     Positives and Pertinent negatives as per HPI.    PAST HISTORY     Past Medical History:  Past Medical History:   Diagnosis Date    Asthma     Hypertension     Murmur     Seizures (HCC)        Past Surgical History:  Past Surgical History:   Procedure Laterality Date    CHOLECYSTECTOMY         Family History:  History reviewed. No pertinent family history.    Social History:  Social History     Tobacco Use    Smoking status: Every Day     Types: Cigarettes    Smokeless tobacco: Never   Substance Use Topics    Alcohol use: Yes    Drug use: Yes     Types: Other-see comments     Comment: HEROIN, 11/5/2024       Allergies:  Allergies   Allergen Reactions    Penicillins Anaphylaxis, Other (See Comments) and Swelling     Other Reaction(s): Hoarseness    Throat swelling       CURRENT MEDICATIONS      Previous Medications    CLONIDINE  acute finding [RT]   1825 Patient reassessed, she is nauseated and dry heaving.  Compazine IV ordered.  Antibiotic for treatment of PID.  N.p.o. at this time. [RT]   1832 Potassium(!): 2.9  Hypokalemia noted on labs.  Patient not tolerating p.o. intake at this time.  IV potassium replenishment ordered. [RT]   1906 POC Lactic Acid:    POC Lactic Acid 1.30 [RT]   2135 Shared decision making with patient.  Ultrasound findings of endometriosis and right ovarian cyst.  Discussed endometriosis as source of pain, and reinforce importance of GYN follow-up for further management.  Discussed hypokalemia need to replenish in the ED.  Patient is tolerating p.o. intake at this time hence we will replenish orally in addition to the IV.  Plan to discharge home with treatment for PID, short course of pain management, strict return precautions, and GYN follow-up in 2 to 3 days.  Patient is in agreement with this plan. [RT]   2242 IV potassium and IV fluids are finished at this time.  Patient tolerating p.o.  Will discharge at this time. [RT]      ED Course User Index  [RT] Winsome Heath, APRN - CNP       Disposition Considerations (Tests not done, Shared Decision Making, Pt Expectation of Test or Tx.): As above     FINAL IMPRESSION     1. PID (acute pelvic inflammatory disease)    2. Hypokalemia    3. Endometrial hyperplasia    4. Subserous leiomyoma of uterus    5. Cyst of right ovary          DISPOSITION/PLAN   DISPOSITION Decision To Discharge 12/20/2024 10:43:25 PM   DISPOSITION CONDITION Stable      PATIENT REFERRED TO:  Campbell Pastrana MD  [address] [place] [address]  [phone]    In 2 days  If symptoms worsen    [facility]  [address] [facility] [id number] [phone]  In 2 days  If symptoms worsen    [facility] EMERGENCY DEPT  [address] [place] [address]  [phone]    If symptoms worsen       DISCHARGE MEDICATIONS:     Medication List        START taking these medications

## 2024-12-21 NOTE — ED NOTES
Discharge instructions were given to the patient by Albino LOVELL     The patient left the Emergency Department alert and oriented and in no acute distress with 4 prescriptions. The patient was encouraged to call or return to the ED for worsening issues or problems and was encouraged to schedule a follow up appointment for continuing care.     Ambulation assessment completed before discharge.  Pt left Emergency Department ambulating at baseline with no ortho devices  Ortho device education: none    The patient verbalized understanding of discharge instructions and prescriptions, all questions were answered. The patient has no further concerns at this time.

## 2024-12-21 NOTE — DISCHARGE INSTRUCTIONS
INDICATION:  pelvic pain, cervical motion tenderness      PELVIC SONOGRAM is performed through the unremarkable urinary bladder.     Uterus contains a right subserosal 5 cm fibroid. Excluding the fibroid the  uterus measures 7.2 x 4.9 x 4.0 cm. Endometrial stripe is 10 mm.      There is no significant free fluid in the pelvic cul-de-sac.     TRANSVAGINAL SONOGRAM is performed following Pelvic Sonogram  in order to more  accurately assess the endometrial stripe and the adnexa.     Endometrial stripe measures 12 mm, abnormal if the patient is postmenopausal,  otherwise can be normal. There is no vascularity of the endometrial stripe on  color Doppler. There is no endometrial cavity fluid.  There is a 5 cm subserosal right fundal fibroid.     Right ovary is normal in size and vascularity, containing a 1.2 cm simple cyst,  and measuring 2.6 x 1.9 x 1.6 cm.     Left ovary remains obscured by bowel gas. No adnexal mass or free fluid is seen.     IMPRESSION:  Avascular 12 mm endometrial stripe. No endocavitary fluid. Fibroid.  Unremarkable adnexa. No free fluid.        Electronically signed by ALEX ARRIAGA

## 2024-12-21 NOTE — ED NOTES
Pelvic exam performed at this time by provider Winsome BRAVO.  Provider chaperoned by primary nurse, Nelida ORTEGA.  Patient with pain and tenderness to touch, plan of care discussed by provider.

## 2024-12-23 LAB
C TRACH DNA SPEC QL NAA+PROBE: NEGATIVE
N GONORRHOEA DNA SPEC QL NAA+PROBE: NEGATIVE
SAMPLE TYPE: NORMAL
SERVICE CMNT-IMP: NORMAL
SPECIMEN SOURCE: NORMAL

## 2025-01-20 ENCOUNTER — HOSPITAL ENCOUNTER (EMERGENCY)
Facility: HOSPITAL | Age: 50
Discharge: HOME | End: 2025-01-20
Payer: MEDICARE

## 2025-01-20 VITALS
BODY MASS INDEX: 27.52 KG/M2 | SYSTOLIC BLOOD PRESSURE: 153 MMHG | OXYGEN SATURATION: 100 % | HEART RATE: 70 BPM | WEIGHT: 170.5 LBS | RESPIRATION RATE: 18 BRPM | DIASTOLIC BLOOD PRESSURE: 89 MMHG | TEMPERATURE: 97.9 F

## 2025-01-20 DIAGNOSIS — R11.2 NAUSEA AND VOMITING, UNSPECIFIED VOMITING TYPE: Primary | ICD-10-CM

## 2025-01-20 DIAGNOSIS — E87.6 HYPOKALEMIA: ICD-10-CM

## 2025-01-20 DIAGNOSIS — B34.9 VIRAL SYNDROME: ICD-10-CM

## 2025-01-20 LAB
ALBUMIN SERPL-MCNC: 4.5 G/DL (ref 3.5–5)
ALBUMIN/GLOB SERPL: 1 (ref 1.1–2.2)
ALP SERPL-CCNC: 98 U/L (ref 45–117)
ALT SERPL-CCNC: 28 U/L (ref 12–78)
ANION GAP SERPL CALC-SCNC: 11 MMOL/L (ref 2–12)
AST SERPL-CCNC: 23 U/L (ref 15–37)
BASOPHILS # BLD: 0.06 K/UL (ref 0–0.1)
BASOPHILS NFR BLD: 0.7 % (ref 0–1)
BILIRUB SERPL-MCNC: 0.8 MG/DL (ref 0.2–1)
BUN SERPL-MCNC: 17 MG/DL (ref 6–20)
BUN/CREAT SERPL: 18 (ref 12–20)
CALCIUM SERPL-MCNC: 10.3 MG/DL (ref 8.5–10.1)
CHLORIDE SERPL-SCNC: 96 MMOL/L (ref 97–108)
CO2 SERPL-SCNC: 28 MMOL/L (ref 21–32)
CREAT SERPL-MCNC: 0.96 MG/DL (ref 0.55–1.02)
DIFFERENTIAL METHOD BLD: NORMAL
EOSINOPHIL # BLD: 0.03 K/UL (ref 0–0.4)
EOSINOPHIL NFR BLD: 0.4 % (ref 0–7)
ERYTHROCYTE [DISTWIDTH] IN BLOOD BY AUTOMATED COUNT: 12.6 % (ref 11.5–14.5)
FLUAV RNA SPEC QL NAA+PROBE: NOT DETECTED
FLUBV RNA SPEC QL NAA+PROBE: NOT DETECTED
GLOBULIN SER CALC-MCNC: 4.6 G/DL (ref 2–4)
GLUCOSE SERPL-MCNC: 90 MG/DL (ref 65–100)
HCT VFR BLD AUTO: 44.7 % (ref 35–47)
HGB BLD-MCNC: 14.5 G/DL (ref 11.5–16)
IMM GRANULOCYTES # BLD AUTO: 0.02 K/UL (ref 0–0.04)
IMM GRANULOCYTES NFR BLD AUTO: 0.2 % (ref 0–0.5)
LIPASE SERPL-CCNC: 19 U/L (ref 13–75)
LYMPHOCYTES # BLD: 2.23 K/UL (ref 0.8–3.5)
LYMPHOCYTES NFR BLD: 26.3 % (ref 12–49)
MCH RBC QN AUTO: 28.7 PG (ref 26–34)
MCHC RBC AUTO-ENTMCNC: 32.4 G/DL (ref 30–36.5)
MCV RBC AUTO: 88.5 FL (ref 80–99)
MONOCYTES # BLD: 0.43 K/UL (ref 0–1)
MONOCYTES NFR BLD: 5.1 % (ref 5–13)
NEUTS SEG # BLD: 5.71 K/UL (ref 1.8–8)
NEUTS SEG NFR BLD: 67.3 % (ref 32–75)
NRBC # BLD: 0 K/UL (ref 0–0.01)
NRBC BLD-RTO: 0 PER 100 WBC
PLATELET # BLD AUTO: 370 K/UL (ref 150–400)
PMV BLD AUTO: 10.1 FL (ref 8.9–12.9)
POTASSIUM SERPL-SCNC: 3.1 MMOL/L (ref 3.5–5.1)
PROT SERPL-MCNC: 9.1 G/DL (ref 6.4–8.2)
RBC # BLD AUTO: 5.05 M/UL (ref 3.8–5.2)
SARS-COV-2 RNA RESP QL NAA+PROBE: NOT DETECTED
SODIUM SERPL-SCNC: 135 MMOL/L (ref 136–145)
SOURCE: NORMAL
TROPONIN I SERPL HS-MCNC: 6 NG/L (ref 0–51)
WBC # BLD AUTO: 8.5 K/UL (ref 3.6–11)

## 2025-01-20 PROCEDURE — 96374 THER/PROPH/DIAG INJ IV PUSH: CPT

## 2025-01-20 PROCEDURE — 80053 COMPREHEN METABOLIC PANEL: CPT

## 2025-01-20 PROCEDURE — 6370000000 HC RX 637 (ALT 250 FOR IP)

## 2025-01-20 PROCEDURE — 85025 COMPLETE CBC W/AUTO DIFF WBC: CPT

## 2025-01-20 PROCEDURE — 93005 ELECTROCARDIOGRAM TRACING: CPT

## 2025-01-20 PROCEDURE — 83690 ASSAY OF LIPASE: CPT

## 2025-01-20 PROCEDURE — 84484 ASSAY OF TROPONIN QUANT: CPT

## 2025-01-20 PROCEDURE — 6360000002 HC RX W HCPCS

## 2025-01-20 PROCEDURE — 87636 SARSCOV2 & INF A&B AMP PRB: CPT

## 2025-01-20 PROCEDURE — 96375 TX/PRO/DX INJ NEW DRUG ADDON: CPT

## 2025-01-20 PROCEDURE — 99284 EMERGENCY DEPT VISIT MOD MDM: CPT

## 2025-01-20 RX ORDER — ONDANSETRON 4 MG/1
4 TABLET, FILM COATED ORAL 3 TIMES DAILY PRN
Qty: 30 TABLET | Refills: 0 | Status: SHIPPED | OUTPATIENT
Start: 2025-01-20

## 2025-01-20 RX ORDER — POTASSIUM CHLORIDE 1500 MG/1
20 TABLET, EXTENDED RELEASE ORAL 2 TIMES DAILY
Qty: 180 TABLET | Refills: 1 | Status: SHIPPED | OUTPATIENT
Start: 2025-01-20

## 2025-01-20 RX ORDER — ONDANSETRON 2 MG/ML
4 INJECTION INTRAMUSCULAR; INTRAVENOUS ONCE
Status: COMPLETED | OUTPATIENT
Start: 2025-01-20 | End: 2025-01-20

## 2025-01-20 RX ORDER — KETOROLAC TROMETHAMINE 30 MG/ML
30 INJECTION, SOLUTION INTRAMUSCULAR; INTRAVENOUS
Status: COMPLETED | OUTPATIENT
Start: 2025-01-20 | End: 2025-01-20

## 2025-01-20 RX ORDER — POTASSIUM CHLORIDE 750 MG/1
20 TABLET, EXTENDED RELEASE ORAL ONCE
Status: COMPLETED | OUTPATIENT
Start: 2025-01-20 | End: 2025-01-20

## 2025-01-20 RX ADMIN — ONDANSETRON 4 MG: 2 INJECTION INTRAMUSCULAR; INTRAVENOUS at 18:10

## 2025-01-20 RX ADMIN — KETOROLAC TROMETHAMINE 30 MG: 30 INJECTION, SOLUTION INTRAMUSCULAR at 18:11

## 2025-01-20 RX ADMIN — POTASSIUM CHLORIDE 20 MEQ: 750 TABLET, EXTENDED RELEASE ORAL at 19:07

## 2025-01-20 ASSESSMENT — ENCOUNTER SYMPTOMS
SINUS PAIN: 1
COUGH: 1
NAUSEA: 1
DIARRHEA: 1
ABDOMINAL PAIN: 0
VOMITING: 1
SINUS PRESSURE: 1

## 2025-01-20 ASSESSMENT — HEART SCORE: ECG: NORMAL

## 2025-01-20 ASSESSMENT — PAIN SCALES - GENERAL
PAINLEVEL_OUTOF10: 9
PAINLEVEL_OUTOF10: 10

## 2025-01-20 ASSESSMENT — PAIN - FUNCTIONAL ASSESSMENT: PAIN_FUNCTIONAL_ASSESSMENT: 0-10

## 2025-01-20 ASSESSMENT — PAIN DESCRIPTION - ORIENTATION: ORIENTATION: RIGHT;LEFT

## 2025-01-20 ASSESSMENT — PAIN DESCRIPTION - DESCRIPTORS: DESCRIPTORS: ACHING

## 2025-01-20 ASSESSMENT — PAIN DESCRIPTION - LOCATION: LOCATION: FACE

## 2025-01-20 NOTE — ED NOTES
Pt presents to ED complaining of flu-like sx such as N/V/D, weakness, fatigue, palpitations, facial pain, ear pain, and feeling hot and cold for several days. Pt is alert and oriented x 4, RR even and unlabored, skin is warm and dry. Pt appears in NAD at this time. Assessment completed and pt updated on plan of care.  Call bell in reach.    Emergency Department Nursing Plan of Care  The Nursing Plan of Care is developed from the Nursing assessment and Emergency Department Attending provider initial evaluation.  The plan of care may be reviewed in the “ED Provider note”.      The Plan of Care was developed with the following considerations:  Patient / Family readiness to learn indicated by:Refer to Medical chart in [facility]  Persons(s) to be included in education: Refer to Medical chart in [facility]  Barriers to Learning/Limitations:Normal     Signed    Ketty Watts RN  1/20/2025

## 2025-01-20 NOTE — ED TRIAGE NOTES
Pt reports multiple symptoms:  N/V/D, weakness, feeling drained, palpitations, feeling hot/cold, facial pain, ear pain.

## 2025-01-21 LAB
EKG ATRIAL RATE: 66 BPM
EKG DIAGNOSIS: NORMAL
EKG P AXIS: 42 DEGREES
EKG P-R INTERVAL: 198 MS
EKG Q-T INTERVAL: 380 MS
EKG QRS DURATION: 86 MS
EKG QTC CALCULATION (BAZETT): 398 MS
EKG R AXIS: -10 DEGREES
EKG T AXIS: 12 DEGREES
EKG VENTRICULAR RATE: 66 BPM

## 2025-01-21 PROCEDURE — 93010 ELECTROCARDIOGRAM REPORT: CPT | Performed by: SPECIALIST

## 2025-01-21 NOTE — ED NOTES
Bedside and Verbal shift change report given to SHANNON Carter (oncoming nurse) by SHANNON Hdez (offgoing nurse). Report included the following information Nurse Handoff Report, ED Encounter Summary, ED SBAR, Intake/Output, MAR, and Recent Results.

## 2025-01-21 NOTE — ED PROVIDER NOTES
daily as needed for Nausea or Vomiting  What changed: You were already taking a medication with the same name, and this prescription was added. Make sure you understand how and when to take each.     * potassium chloride 10 MEQ extended release tablet  Commonly known as: KLOR-CON M  Take 1 tablet by mouth daily  What changed: Another medication with the same name was added. Make sure you understand how and when to take each.     * potassium chloride 20 MEQ extended release tablet  Commonly known as: KLOR-CON M  Take 1 tablet by mouth 2 times daily  What changed: You were already taking a medication with the same name, and this prescription was added. Make sure you understand how and when to take each.           * This list has 4 medication(s) that are the same as other medications prescribed for you. Read the directions carefully, and ask your doctor or other care provider to review them with you.                ASK your doctor about these medications      cloNIDine 0.2 MG/24HR Ptwk  Commonly known as: CATAPRES  Place 1 patch onto the skin once a week     * dicyclomine 20 MG tablet  Commonly known as: BENTYL  Take 1 tablet by mouth every 4-6 hours as needed (abdominal pain)     * dicyclomine 20 MG tablet  Commonly known as: BENTYL  Take 1 tablet by mouth 4 times daily     ibuprofen 600 MG tablet  Commonly known as: ADVIL;MOTRIN  Take 1 tablet by mouth 3 times daily as needed for Pain     naproxen 500 MG tablet  Commonly known as: Naprosyn  Take 1 tablet by mouth 2 times daily           * This list has 2 medication(s) that are the same as other medications prescribed for you. Read the directions carefully, and ask your doctor or other care provider to review them with you.                   Where to Get Your Medications        These medications were sent to [facility]/pharmacy #[id number] - [place], [place] - [address] - P [phone] - F [phone]  [address] [address]      Phone: [phone]   ondansetron

## 2025-04-08 ENCOUNTER — HOSPITAL ENCOUNTER (EMERGENCY)
Facility: HOSPITAL | Age: 50
Discharge: HOME | End: 2025-04-08
Payer: MEDICARE

## 2025-04-08 ENCOUNTER — APPOINTMENT (OUTPATIENT)
Facility: HOSPITAL | Age: 50
End: 2025-04-08
Payer: MEDICARE

## 2025-04-08 VITALS
WEIGHT: 171 LBS | TEMPERATURE: 98.1 F | DIASTOLIC BLOOD PRESSURE: 76 MMHG | HEIGHT: 66 IN | RESPIRATION RATE: 16 BRPM | BODY MASS INDEX: 27.48 KG/M2 | HEART RATE: 76 BPM | SYSTOLIC BLOOD PRESSURE: 124 MMHG | OXYGEN SATURATION: 99 %

## 2025-04-08 DIAGNOSIS — S61.213A LACERATION OF LEFT MIDDLE FINGER WITHOUT FOREIGN BODY WITHOUT DAMAGE TO NAIL, INITIAL ENCOUNTER: Primary | ICD-10-CM

## 2025-04-08 PROCEDURE — 6360000002 HC RX W HCPCS

## 2025-04-08 PROCEDURE — 90714 TD VACC NO PRESV 7 YRS+ IM: CPT

## 2025-04-08 PROCEDURE — 73140 X-RAY EXAM OF FINGER(S): CPT

## 2025-04-08 PROCEDURE — 90471 IMMUNIZATION ADMIN: CPT

## 2025-04-08 PROCEDURE — 6370000000 HC RX 637 (ALT 250 FOR IP)

## 2025-04-08 PROCEDURE — 12001 RPR S/N/AX/GEN/TRNK 2.5CM/<: CPT

## 2025-04-08 PROCEDURE — 99284 EMERGENCY DEPT VISIT MOD MDM: CPT

## 2025-04-08 RX ORDER — OXYCODONE AND ACETAMINOPHEN 5; 325 MG/1; MG/1
1 TABLET ORAL
Refills: 0 | Status: COMPLETED | OUTPATIENT
Start: 2025-04-08 | End: 2025-04-08

## 2025-04-08 RX ADMIN — CLOSTRIDIUM TETANI TOXOID ANTIGEN (FORMALDEHYDE INACTIVATED) AND CORYNEBACTERIUM DIPHTHERIAE TOXOID ANTIGEN (FORMALDEHYDE INACTIVATED) 0.5 ML: 5; 2 INJECTION, SUSPENSION INTRAMUSCULAR at 23:46

## 2025-04-08 RX ADMIN — OXYCODONE HYDROCHLORIDE AND ACETAMINOPHEN 1 TABLET: 5; 325 TABLET ORAL at 22:50

## 2025-04-08 ASSESSMENT — PAIN DESCRIPTION - ORIENTATION: ORIENTATION: LEFT

## 2025-04-08 ASSESSMENT — PAIN SCALES - GENERAL: PAINLEVEL_OUTOF10: 8

## 2025-04-08 ASSESSMENT — PAIN - FUNCTIONAL ASSESSMENT: PAIN_FUNCTIONAL_ASSESSMENT: 0-10

## 2025-04-08 ASSESSMENT — PAIN DESCRIPTION - DESCRIPTORS: DESCRIPTORS: DISCOMFORT

## 2025-04-08 ASSESSMENT — PAIN DESCRIPTION - LOCATION: LOCATION: FINGER (COMMENT WHICH ONE)

## 2025-04-09 NOTE — DISCHARGE INSTRUCTIONS
Please follow up with your Primary care doctor, urgent care, or ED for suture removal in 10 days.  After the sutures are removed, please place sunscreen on the area for next few months to prevent scarring.    Please keep wound clean and dry for next 24 hours.  After 24 hours can get the wound wet but keep clean.      Please return to ER for signs of infection including redness around the site, pus drainage, fevers, worsening swelling despite pain medications and ice packs.

## 2025-04-09 NOTE — ED PROVIDER NOTES
[facility] EMERGENCY DEPARTMENT  EMERGENCY DEPARTMENT ENCOUNTER       Pt Name: Gemini Malcolm  MRN: [id number]  Birthdate 1975  Date of evaluation: 4/8/2025  Provider: Ashley Lunsford PA-C   PCP: None, None  Note Started: 11:54 PM EDT 4/8/25     CHIEF COMPLAINT       Chief Complaint   Patient presents with    Finger Injury     Left middle finger at 3pm        HISTORY OF PRESENT ILLNESS: 1 or more elements      History From: Patient  HPI Limitations: None     Gemini Malcolm is a 50 y.o. female who presents by ambulance to the emergency department for evaluation of injury to left middle finger that occurred at 3 PM today.  Patient states that she shot her finger in a car door.  Denies taking any medication for pain, denies knowing last tetanus vaccine.  Denies additional injury.     Nursing Notes were all reviewed and agreed with or any disagreements were addressed in the HPI.     REVIEW OF SYSTEMS      Review of Systems     Positives and Pertinent negatives as per HPI.    PAST HISTORY     Past Medical History:  Past Medical History:   Diagnosis Date    Asthma     Hypertension     Murmur     Seizures (HCC)        Past Surgical History:  Past Surgical History:   Procedure Laterality Date    CHOLECYSTECTOMY         Family History:  No family history on file.    Social History:  Social History     Tobacco Use    Smoking status: Every Day     Types: Cigarettes    Smokeless tobacco: Never   Substance Use Topics    Alcohol use: Yes    Drug use: Yes     Types: Other-see comments     Comment: HEROIN, 11/5/2024       Allergies:  Allergies   Allergen Reactions    Penicillins Anaphylaxis, Other (See Comments) and Swelling     Other Reaction(s): Hoarseness    Throat swelling       CURRENT MEDICATIONS      Discharge Medication List as of 4/8/2025 11:47 PM        CONTINUE these medications which have NOT CHANGED    Details   !! potassium chloride (KLOR-CON M) 20 MEQ extended release tablet Take 1 tablet by mouth 2 times

## 2025-04-09 NOTE — ED TRIAGE NOTES
Pt presents ambulatory to ED via [facility] with CC left middle finger injury, pt reports shutting finger in car door at 3pm  Pt denies taking pain medication  Unknown last tetanus vacc

## 2025-08-05 ENCOUNTER — APPOINTMENT (OUTPATIENT)
Facility: HOSPITAL | Age: 50
End: 2025-08-05
Payer: MEDICARE

## 2025-08-05 ENCOUNTER — HOSPITAL ENCOUNTER (EMERGENCY)
Facility: HOSPITAL | Age: 50
Discharge: HOME | End: 2025-08-05
Payer: MEDICARE

## 2025-08-05 VITALS
BODY MASS INDEX: 25.23 KG/M2 | DIASTOLIC BLOOD PRESSURE: 92 MMHG | HEART RATE: 97 BPM | HEIGHT: 66 IN | OXYGEN SATURATION: 97 % | WEIGHT: 157 LBS | RESPIRATION RATE: 18 BRPM | SYSTOLIC BLOOD PRESSURE: 128 MMHG | TEMPERATURE: 98.4 F

## 2025-08-05 DIAGNOSIS — R19.7 DIARRHEA, UNSPECIFIED TYPE: ICD-10-CM

## 2025-08-05 DIAGNOSIS — D25.9 UTERINE LEIOMYOMA, UNSPECIFIED LOCATION: Primary | ICD-10-CM

## 2025-08-05 LAB
ALBUMIN SERPL-MCNC: 4.5 G/DL (ref 3.5–5.2)
ALBUMIN/GLOB SERPL: 1.1 (ref 1.1–2.2)
ALP SERPL-CCNC: 94 U/L (ref 35–104)
ALT SERPL-CCNC: 26 U/L (ref 10–35)
ANION GAP SERPL CALC-SCNC: 16 MMOL/L (ref 2–14)
APPEARANCE UR: ABNORMAL
AST SERPL-CCNC: 31 U/L (ref 10–35)
BACTERIA URNS QL MICRO: NEGATIVE /HPF
BASOPHILS # BLD: 0.05 K/UL (ref 0–0.1)
BASOPHILS NFR BLD: 0.8 % (ref 0–1)
BILIRUB SERPL-MCNC: 0.5 MG/DL (ref 0–1.2)
BILIRUB UR QL CFM: NEGATIVE
BUN SERPL-MCNC: 17 MG/DL (ref 6–20)
BUN/CREAT SERPL: 21 (ref 12–20)
CALCIUM SERPL-MCNC: 11 MG/DL (ref 8.6–10)
CHLORIDE SERPL-SCNC: 97 MMOL/L (ref 98–107)
CO2 SERPL-SCNC: 23 MMOL/L (ref 20–29)
COLOR UR: ABNORMAL
CREAT SERPL-MCNC: 0.84 MG/DL (ref 0.6–1)
DIFFERENTIAL METHOD BLD: NORMAL
EOSINOPHIL # BLD: 0.04 K/UL (ref 0–0.4)
EOSINOPHIL NFR BLD: 0.6 % (ref 0–7)
EPITH CASTS URNS QL MICRO: ABNORMAL /LPF
ERYTHROCYTE [DISTWIDTH] IN BLOOD BY AUTOMATED COUNT: 12.4 % (ref 11.5–14.5)
GLOBULIN SER CALC-MCNC: 4.2 G/DL (ref 2–4)
GLUCOSE SERPL-MCNC: 99 MG/DL (ref 65–100)
GLUCOSE UR STRIP.AUTO-MCNC: NEGATIVE MG/DL
HCG UR QL: NEGATIVE
HCT VFR BLD AUTO: 42.1 % (ref 35–47)
HGB BLD-MCNC: 14 G/DL (ref 11.5–16)
HGB UR QL STRIP: NEGATIVE
HYALINE CASTS URNS QL MICRO: ABNORMAL /LPF (ref 0–5)
IMM GRANULOCYTES # BLD AUTO: 0.02 K/UL (ref 0–0.04)
IMM GRANULOCYTES NFR BLD AUTO: 0.3 % (ref 0–0.5)
KETONES UR QL STRIP.AUTO: 15 MG/DL
LEUKOCYTE ESTERASE UR QL STRIP.AUTO: NEGATIVE
LIPASE SERPL-CCNC: 20 U/L (ref 13–60)
LYMPHOCYTES # BLD: 1.57 K/UL (ref 0.8–3.5)
LYMPHOCYTES NFR BLD: 23.9 % (ref 12–49)
MCH RBC QN AUTO: 29.5 PG (ref 26–34)
MCHC RBC AUTO-ENTMCNC: 33.3 G/DL (ref 30–36.5)
MCV RBC AUTO: 88.6 FL (ref 80–99)
MONOCYTES # BLD: 0.38 K/UL (ref 0–1)
MONOCYTES NFR BLD: 5.8 % (ref 5–13)
NEUTS SEG # BLD: 4.52 K/UL (ref 1.8–8)
NEUTS SEG NFR BLD: 68.6 % (ref 32–75)
NITRITE UR QL STRIP.AUTO: NEGATIVE
NRBC # BLD: 0 K/UL (ref 0–0.01)
NRBC BLD-RTO: 0 PER 100 WBC
PH UR STRIP: 5.5 (ref 5–8)
PLATELET # BLD AUTO: 314 K/UL (ref 150–400)
PMV BLD AUTO: 10.2 FL (ref 8.9–12.9)
POTASSIUM SERPL-SCNC: 3.7 MMOL/L (ref 3.5–5.1)
PROT SERPL-MCNC: 8.7 G/DL (ref 6.4–8.3)
PROT UR STRIP-MCNC: 30 MG/DL
RBC # BLD AUTO: 4.75 M/UL (ref 3.8–5.2)
RBC #/AREA URNS HPF: ABNORMAL /HPF (ref 0–5)
SODIUM SERPL-SCNC: 136 MMOL/L (ref 136–145)
SP GR UR REFRACTOMETRY: >1.03 (ref 1–1.03)
URINE CULTURE IF INDICATED: ABNORMAL
UROBILINOGEN UR QL STRIP.AUTO: 1 EU/DL (ref 0.2–1)
WBC # BLD AUTO: 6.6 K/UL (ref 3.6–11)
WBC URNS QL MICRO: ABNORMAL /HPF (ref 0–4)

## 2025-08-05 PROCEDURE — 81025 URINE PREGNANCY TEST: CPT

## 2025-08-05 PROCEDURE — 2580000003 HC RX 258

## 2025-08-05 PROCEDURE — 81001 URINALYSIS AUTO W/SCOPE: CPT

## 2025-08-05 PROCEDURE — 96374 THER/PROPH/DIAG INJ IV PUSH: CPT

## 2025-08-05 PROCEDURE — 96375 TX/PRO/DX INJ NEW DRUG ADDON: CPT

## 2025-08-05 PROCEDURE — 83690 ASSAY OF LIPASE: CPT

## 2025-08-05 PROCEDURE — 6360000004 HC RX CONTRAST MEDICATION

## 2025-08-05 PROCEDURE — 96361 HYDRATE IV INFUSION ADD-ON: CPT

## 2025-08-05 PROCEDURE — 96376 TX/PRO/DX INJ SAME DRUG ADON: CPT

## 2025-08-05 PROCEDURE — 80053 COMPREHEN METABOLIC PANEL: CPT

## 2025-08-05 PROCEDURE — 99285 EMERGENCY DEPT VISIT HI MDM: CPT

## 2025-08-05 PROCEDURE — 74177 CT ABD & PELVIS W/CONTRAST: CPT

## 2025-08-05 PROCEDURE — 85025 COMPLETE CBC W/AUTO DIFF WBC: CPT

## 2025-08-05 PROCEDURE — 6360000002 HC RX W HCPCS

## 2025-08-05 RX ORDER — MORPHINE SULFATE 4 MG/ML
4 INJECTION, SOLUTION INTRAMUSCULAR; INTRAVENOUS
Status: COMPLETED | OUTPATIENT
Start: 2025-08-05 | End: 2025-08-05

## 2025-08-05 RX ORDER — KETOROLAC TROMETHAMINE 10 MG/1
10 TABLET, FILM COATED ORAL EVERY 6 HOURS PRN
Qty: 20 TABLET | Refills: 0 | Status: SHIPPED | OUTPATIENT
Start: 2025-08-05 | End: 2025-08-06

## 2025-08-05 RX ORDER — IOPAMIDOL 755 MG/ML
100 INJECTION, SOLUTION INTRAVASCULAR
Status: COMPLETED | OUTPATIENT
Start: 2025-08-05 | End: 2025-08-05

## 2025-08-05 RX ORDER — KETOROLAC TROMETHAMINE 30 MG/ML
30 INJECTION, SOLUTION INTRAMUSCULAR; INTRAVENOUS
Status: COMPLETED | OUTPATIENT
Start: 2025-08-05 | End: 2025-08-05

## 2025-08-05 RX ORDER — 0.9 % SODIUM CHLORIDE 0.9 %
1000 INTRAVENOUS SOLUTION INTRAVENOUS
Status: COMPLETED | OUTPATIENT
Start: 2025-08-05 | End: 2025-08-05

## 2025-08-05 RX ADMIN — SODIUM CHLORIDE 1000 ML: 0.9 INJECTION, SOLUTION INTRAVENOUS at 20:17

## 2025-08-05 RX ADMIN — MORPHINE SULFATE 4 MG: 4 INJECTION INTRAVENOUS at 20:17

## 2025-08-05 RX ADMIN — KETOROLAC TROMETHAMINE 30 MG: 30 INJECTION, SOLUTION INTRAMUSCULAR at 22:42

## 2025-08-05 RX ADMIN — IOPAMIDOL 100 ML: 755 INJECTION, SOLUTION INTRAVENOUS at 21:34

## 2025-08-05 RX ADMIN — MORPHINE SULFATE 4 MG: 4 INJECTION INTRAVENOUS at 21:52

## 2025-08-05 ASSESSMENT — LIFESTYLE VARIABLES
HOW MANY STANDARD DRINKS CONTAINING ALCOHOL DO YOU HAVE ON A TYPICAL DAY: 1 OR 2
HOW OFTEN DO YOU HAVE A DRINK CONTAINING ALCOHOL: 2-3 TIMES A WEEK

## 2025-08-05 ASSESSMENT — PAIN DESCRIPTION - LOCATION
LOCATION: ABDOMEN
LOCATION: ABDOMEN;BACK
LOCATION: ABDOMEN

## 2025-08-05 ASSESSMENT — PAIN SCALES - GENERAL
PAINLEVEL_OUTOF10: 8
PAINLEVEL_OUTOF10: 10
PAINLEVEL_OUTOF10: 10

## 2025-08-05 ASSESSMENT — PAIN DESCRIPTION - PAIN TYPE: TYPE: ACUTE PAIN

## 2025-08-05 ASSESSMENT — PAIN DESCRIPTION - DESCRIPTORS
DESCRIPTORS: SHARP;ACHING
DESCRIPTORS: SHARP
DESCRIPTORS: SHARP

## 2025-08-05 ASSESSMENT — ENCOUNTER SYMPTOMS
ABDOMINAL PAIN: 1
VOMITING: 0
BACK PAIN: 0
CONSTIPATION: 0
SHORTNESS OF BREATH: 0
DIARRHEA: 1
SORE THROAT: 0
NAUSEA: 0

## 2025-08-05 ASSESSMENT — PAIN DESCRIPTION - ORIENTATION
ORIENTATION: LEFT;LOWER
ORIENTATION: LOWER;LEFT
ORIENTATION: LEFT;LOWER

## 2025-08-05 ASSESSMENT — PAIN - FUNCTIONAL ASSESSMENT: PAIN_FUNCTIONAL_ASSESSMENT: 0-10

## 2025-08-06 ENCOUNTER — HOSPITAL ENCOUNTER (EMERGENCY)
Facility: HOSPITAL | Age: 50
Discharge: HOME | End: 2025-08-06
Payer: MEDICARE

## 2025-08-06 VITALS
TEMPERATURE: 97.9 F | DIASTOLIC BLOOD PRESSURE: 83 MMHG | BODY MASS INDEX: 25.37 KG/M2 | OXYGEN SATURATION: 100 % | WEIGHT: 157.19 LBS | SYSTOLIC BLOOD PRESSURE: 136 MMHG | HEART RATE: 64 BPM | RESPIRATION RATE: 20 BRPM

## 2025-08-06 DIAGNOSIS — D25.9 UTERINE LEIOMYOMA, UNSPECIFIED LOCATION: ICD-10-CM

## 2025-08-06 DIAGNOSIS — R10.33 PERIUMBILICAL ABDOMINAL PAIN: Primary | ICD-10-CM

## 2025-08-06 PROCEDURE — 6360000002 HC RX W HCPCS: Performed by: PHYSICIAN ASSISTANT

## 2025-08-06 PROCEDURE — 96372 THER/PROPH/DIAG INJ SC/IM: CPT

## 2025-08-06 PROCEDURE — 99284 EMERGENCY DEPT VISIT MOD MDM: CPT

## 2025-08-06 PROCEDURE — 6370000000 HC RX 637 (ALT 250 FOR IP): Performed by: PHYSICIAN ASSISTANT

## 2025-08-06 RX ORDER — ONDANSETRON 4 MG/1
4 TABLET, ORALLY DISINTEGRATING ORAL ONCE
Status: COMPLETED | OUTPATIENT
Start: 2025-08-06 | End: 2025-08-06

## 2025-08-06 RX ORDER — KETOROLAC TROMETHAMINE 10 MG/1
10 TABLET, FILM COATED ORAL EVERY 6 HOURS PRN
Qty: 15 TABLET | Refills: 0 | Status: SHIPPED | OUTPATIENT
Start: 2025-08-06 | End: 2025-08-06

## 2025-08-06 RX ORDER — ONDANSETRON 4 MG/1
4 TABLET, ORALLY DISINTEGRATING ORAL EVERY 8 HOURS PRN
Qty: 15 TABLET | Refills: 0 | Status: SHIPPED | OUTPATIENT
Start: 2025-08-06

## 2025-08-06 RX ORDER — KETOROLAC TROMETHAMINE 30 MG/ML
30 INJECTION, SOLUTION INTRAMUSCULAR; INTRAVENOUS ONCE
Status: COMPLETED | OUTPATIENT
Start: 2025-08-06 | End: 2025-08-06

## 2025-08-06 RX ORDER — IBUPROFEN 800 MG/1
800 TABLET, FILM COATED ORAL 3 TIMES DAILY PRN
Qty: 20 TABLET | Refills: 0 | Status: SHIPPED | OUTPATIENT
Start: 2025-08-06

## 2025-08-06 RX ADMIN — KETOROLAC TROMETHAMINE 30 MG: 30 INJECTION, SOLUTION INTRAMUSCULAR at 12:24

## 2025-08-06 RX ADMIN — ONDANSETRON 4 MG: 4 TABLET, ORALLY DISINTEGRATING ORAL at 12:24

## 2025-08-06 ASSESSMENT — PAIN DESCRIPTION - ORIENTATION: ORIENTATION: LOWER

## 2025-08-06 ASSESSMENT — PAIN DESCRIPTION - DESCRIPTORS: DESCRIPTORS: ACHING

## 2025-08-06 ASSESSMENT — PAIN DESCRIPTION - PAIN TYPE: TYPE: ACUTE PAIN

## 2025-08-06 ASSESSMENT — PAIN SCALES - GENERAL: PAINLEVEL_OUTOF10: 8

## 2025-08-06 ASSESSMENT — PAIN DESCRIPTION - LOCATION: LOCATION: ABDOMEN;PELVIS

## 2025-08-06 ASSESSMENT — PAIN - FUNCTIONAL ASSESSMENT: PAIN_FUNCTIONAL_ASSESSMENT: 0-10

## 2025-08-09 ENCOUNTER — HOSPITAL ENCOUNTER (EMERGENCY)
Facility: HOSPITAL | Age: 50
Discharge: HOME | End: 2025-08-09
Attending: EMERGENCY MEDICINE
Payer: MEDICARE

## 2025-08-09 VITALS
HEIGHT: 66 IN | RESPIRATION RATE: 16 BRPM | DIASTOLIC BLOOD PRESSURE: 102 MMHG | BODY MASS INDEX: 25.71 KG/M2 | HEART RATE: 67 BPM | OXYGEN SATURATION: 96 % | TEMPERATURE: 98.6 F | SYSTOLIC BLOOD PRESSURE: 169 MMHG | WEIGHT: 160 LBS

## 2025-08-09 DIAGNOSIS — F11.93 HEROIN WITHDRAWAL (HCC): Primary | ICD-10-CM

## 2025-08-09 DIAGNOSIS — I16.0 HYPERTENSIVE URGENCY: ICD-10-CM

## 2025-08-09 LAB
ALBUMIN SERPL-MCNC: 4.6 G/DL (ref 3.5–5.2)
ALBUMIN/GLOB SERPL: 1.2 (ref 1.1–2.2)
ALP SERPL-CCNC: 81 U/L (ref 35–104)
ALT SERPL-CCNC: 21 U/L (ref 10–35)
ANION GAP SERPL CALC-SCNC: 16 MMOL/L (ref 2–14)
APPEARANCE UR: ABNORMAL
AST SERPL-CCNC: 34 U/L (ref 10–35)
BACTERIA URNS QL MICRO: ABNORMAL /HPF
BASOPHILS # BLD: 0.05 K/UL (ref 0–0.1)
BASOPHILS NFR BLD: 0.7 % (ref 0–1)
BILIRUB SERPL-MCNC: 0.4 MG/DL (ref 0–1.2)
BILIRUB UR QL: NEGATIVE
BUN SERPL-MCNC: 13 MG/DL (ref 6–20)
BUN/CREAT SERPL: 17 (ref 12–20)
CALCIUM SERPL-MCNC: 10.8 MG/DL (ref 8.6–10)
CHLORIDE SERPL-SCNC: 96 MMOL/L (ref 98–107)
CO2 SERPL-SCNC: 26 MMOL/L (ref 20–29)
COLOR UR: ABNORMAL
CREAT SERPL-MCNC: 0.79 MG/DL (ref 0.6–1)
DIFFERENTIAL METHOD BLD: ABNORMAL
EOSINOPHIL # BLD: 0.04 K/UL (ref 0–0.4)
EOSINOPHIL NFR BLD: 0.6 % (ref 0–7)
EPITH CASTS URNS QL MICRO: ABNORMAL /LPF
ERYTHROCYTE [DISTWIDTH] IN BLOOD BY AUTOMATED COUNT: 12.4 % (ref 11.5–14.5)
GLOBULIN SER CALC-MCNC: 3.9 G/DL (ref 2–4)
GLUCOSE SERPL-MCNC: 107 MG/DL (ref 65–100)
GLUCOSE UR STRIP.AUTO-MCNC: NEGATIVE MG/DL
HCT VFR BLD AUTO: 42 % (ref 35–47)
HGB BLD-MCNC: 14 G/DL (ref 11.5–16)
HGB UR QL STRIP: ABNORMAL
IMM GRANULOCYTES # BLD AUTO: 0.03 K/UL (ref 0–0.04)
IMM GRANULOCYTES NFR BLD AUTO: 0.4 % (ref 0–0.5)
KETONES UR QL STRIP.AUTO: 15 MG/DL
LEUKOCYTE ESTERASE UR QL STRIP.AUTO: NEGATIVE
LIPASE SERPL-CCNC: 28 U/L (ref 13–60)
LYMPHOCYTES # BLD: 1.28 K/UL (ref 0.8–3.5)
LYMPHOCYTES NFR BLD: 18.2 % (ref 12–49)
MCH RBC QN AUTO: 29.4 PG (ref 26–34)
MCHC RBC AUTO-ENTMCNC: 33.3 G/DL (ref 30–36.5)
MCV RBC AUTO: 88.2 FL (ref 80–99)
MONOCYTES # BLD: 0.27 K/UL (ref 0–1)
MONOCYTES NFR BLD: 3.8 % (ref 5–13)
NEUTS SEG # BLD: 5.36 K/UL (ref 1.8–8)
NEUTS SEG NFR BLD: 76.3 % (ref 32–75)
NITRITE UR QL STRIP.AUTO: NEGATIVE
NRBC # BLD: 0 K/UL (ref 0–0.01)
NRBC BLD-RTO: 0 PER 100 WBC
PH UR STRIP: 7.5 (ref 5–8)
PLATELET # BLD AUTO: 375 K/UL (ref 150–400)
PMV BLD AUTO: 11 FL (ref 8.9–12.9)
POTASSIUM SERPL-SCNC: 4.1 MMOL/L (ref 3.5–5.1)
PROT SERPL-MCNC: 8.6 G/DL (ref 6.4–8.3)
PROT UR STRIP-MCNC: ABNORMAL MG/DL
RBC # BLD AUTO: 4.76 M/UL (ref 3.8–5.2)
RBC #/AREA URNS HPF: ABNORMAL /HPF (ref 0–5)
SODIUM SERPL-SCNC: 138 MMOL/L (ref 136–145)
SP GR UR REFRACTOMETRY: 1.02
URINE CULTURE IF INDICATED: ABNORMAL
UROBILINOGEN UR QL STRIP.AUTO: 1 EU/DL (ref 0.2–1)
WBC # BLD AUTO: 7 K/UL (ref 3.6–11)
WBC URNS QL MICRO: ABNORMAL /HPF (ref 0–4)

## 2025-08-09 PROCEDURE — 81001 URINALYSIS AUTO W/SCOPE: CPT

## 2025-08-09 PROCEDURE — 85025 COMPLETE CBC W/AUTO DIFF WBC: CPT

## 2025-08-09 PROCEDURE — 6370000000 HC RX 637 (ALT 250 FOR IP): Performed by: EMERGENCY MEDICINE

## 2025-08-09 PROCEDURE — 80053 COMPREHEN METABOLIC PANEL: CPT

## 2025-08-09 PROCEDURE — 6360000002 HC RX W HCPCS: Performed by: EMERGENCY MEDICINE

## 2025-08-09 PROCEDURE — 96375 TX/PRO/DX INJ NEW DRUG ADDON: CPT

## 2025-08-09 PROCEDURE — 2500000003 HC RX 250 WO HCPCS: Performed by: EMERGENCY MEDICINE

## 2025-08-09 PROCEDURE — 36415 COLL VENOUS BLD VENIPUNCTURE: CPT

## 2025-08-09 PROCEDURE — 2580000003 HC RX 258: Performed by: EMERGENCY MEDICINE

## 2025-08-09 PROCEDURE — 83690 ASSAY OF LIPASE: CPT

## 2025-08-09 PROCEDURE — 96374 THER/PROPH/DIAG INJ IV PUSH: CPT

## 2025-08-09 PROCEDURE — 99284 EMERGENCY DEPT VISIT MOD MDM: CPT

## 2025-08-09 RX ORDER — 0.9 % SODIUM CHLORIDE 0.9 %
1000 INTRAVENOUS SOLUTION INTRAVENOUS ONCE
Status: COMPLETED | OUTPATIENT
Start: 2025-08-09 | End: 2025-08-09

## 2025-08-09 RX ORDER — ONDANSETRON 2 MG/ML
8 INJECTION INTRAMUSCULAR; INTRAVENOUS ONCE
Status: COMPLETED | OUTPATIENT
Start: 2025-08-09 | End: 2025-08-09

## 2025-08-09 RX ORDER — CLONIDINE HYDROCHLORIDE 0.1 MG/1
0.2 TABLET ORAL
Status: COMPLETED | OUTPATIENT
Start: 2025-08-09 | End: 2025-08-09

## 2025-08-09 RX ORDER — DIAZEPAM 10 MG/2ML
5 INJECTION, SOLUTION INTRAMUSCULAR; INTRAVENOUS ONCE
Status: COMPLETED | OUTPATIENT
Start: 2025-08-09 | End: 2025-08-09

## 2025-08-09 RX ORDER — HALOPERIDOL 5 MG/ML
5 INJECTION INTRAMUSCULAR ONCE
Status: COMPLETED | OUTPATIENT
Start: 2025-08-09 | End: 2025-08-09

## 2025-08-09 RX ORDER — KETOROLAC TROMETHAMINE 30 MG/ML
15 INJECTION, SOLUTION INTRAMUSCULAR; INTRAVENOUS ONCE
Status: COMPLETED | OUTPATIENT
Start: 2025-08-09 | End: 2025-08-09

## 2025-08-09 RX ADMIN — ONDANSETRON 8 MG: 2 INJECTION, SOLUTION INTRAMUSCULAR; INTRAVENOUS at 04:40

## 2025-08-09 RX ADMIN — CLONIDINE HYDROCHLORIDE 0.2 MG: 0.1 TABLET ORAL at 06:47

## 2025-08-09 RX ADMIN — FAMOTIDINE 20 MG: 10 INJECTION, SOLUTION INTRAVENOUS at 06:45

## 2025-08-09 RX ADMIN — HALOPERIDOL LACTATE 5 MG: 5 INJECTION, SOLUTION INTRAMUSCULAR at 06:45

## 2025-08-09 RX ADMIN — SODIUM CHLORIDE 1000 ML: 9 INJECTION, SOLUTION INTRAVENOUS at 04:42

## 2025-08-09 RX ADMIN — DIAZEPAM 5 MG: 5 INJECTION, SOLUTION INTRAMUSCULAR; INTRAVENOUS at 06:46

## 2025-08-09 RX ADMIN — KETOROLAC TROMETHAMINE 15 MG: 30 INJECTION, SOLUTION INTRAMUSCULAR at 04:40

## 2025-08-09 ASSESSMENT — PAIN - FUNCTIONAL ASSESSMENT: PAIN_FUNCTIONAL_ASSESSMENT: 0-10

## 2025-08-09 ASSESSMENT — PAIN DESCRIPTION - DESCRIPTORS: DESCRIPTORS: ACHING

## 2025-08-09 ASSESSMENT — PAIN DESCRIPTION - LOCATION
LOCATION: ABDOMEN
LOCATION: ABDOMEN

## 2025-08-09 ASSESSMENT — PAIN SCALES - GENERAL
PAINLEVEL_OUTOF10: 9
PAINLEVEL_OUTOF10: 10

## 2025-08-09 ASSESSMENT — PAIN DESCRIPTION - ORIENTATION
ORIENTATION: LEFT
ORIENTATION: RIGHT;LEFT

## 2025-08-09 ASSESSMENT — LIFESTYLE VARIABLES
HOW OFTEN DO YOU HAVE A DRINK CONTAINING ALCOHOL: 4 OR MORE TIMES A WEEK
HOW MANY STANDARD DRINKS CONTAINING ALCOHOL DO YOU HAVE ON A TYPICAL DAY: 5 OR 6

## 2025-08-27 ASSESSMENT — ENCOUNTER SYMPTOMS
CONSTIPATION: 0
NAUSEA: 1
EYE PAIN: 0
ABDOMINAL DISTENTION: 0
BACK PAIN: 0
BLOOD IN STOOL: 0
SORE THROAT: 0
EYE REDNESS: 0
PHOTOPHOBIA: 0
RHINORRHEA: 1
COLOR CHANGE: 0
VOMITING: 1
TROUBLE SWALLOWING: 0
ABDOMINAL PAIN: 1
COUGH: 0
SHORTNESS OF BREATH: 0
DIARRHEA: 0

## 2025-09-06 ENCOUNTER — APPOINTMENT (OUTPATIENT)
Facility: HOSPITAL | Age: 50
End: 2025-09-06
Payer: MEDICARE

## 2025-09-06 ENCOUNTER — HOSPITAL ENCOUNTER (EMERGENCY)
Facility: HOSPITAL | Age: 50
Discharge: HOME | End: 2025-09-06
Attending: EMERGENCY MEDICINE
Payer: MEDICARE

## 2025-09-06 VITALS
HEART RATE: 96 BPM | WEIGHT: 140 LBS | TEMPERATURE: 98.2 F | RESPIRATION RATE: 19 BRPM | HEIGHT: 66 IN | BODY MASS INDEX: 22.5 KG/M2 | DIASTOLIC BLOOD PRESSURE: 87 MMHG | OXYGEN SATURATION: 100 % | SYSTOLIC BLOOD PRESSURE: 132 MMHG

## 2025-09-06 DIAGNOSIS — R00.2 PALPITATIONS: Primary | ICD-10-CM

## 2025-09-06 DIAGNOSIS — F14.90 COCAINE USE: ICD-10-CM

## 2025-09-06 DIAGNOSIS — F11.90 HEROIN USE: ICD-10-CM

## 2025-09-06 DIAGNOSIS — F41.1 ANXIETY STATE: ICD-10-CM

## 2025-09-06 DIAGNOSIS — E87.6 ACUTE HYPOKALEMIA: ICD-10-CM

## 2025-09-06 LAB
ALBUMIN SERPL-MCNC: 4.1 G/DL (ref 3.5–5.2)
ALBUMIN/GLOB SERPL: 1 (ref 1.1–2.2)
ALP SERPL-CCNC: 83 U/L (ref 35–104)
ALT SERPL-CCNC: 18 U/L (ref 10–35)
AMPHET UR QL SCN: NEGATIVE
ANION GAP SERPL CALC-SCNC: 15 MMOL/L (ref 2–14)
APPEARANCE UR: CLEAR
AST SERPL-CCNC: 23 U/L (ref 10–35)
BACTERIA URNS QL MICRO: NEGATIVE /HPF
BARBITURATES UR QL SCN: NEGATIVE
BENZODIAZ UR QL: NEGATIVE
BILIRUB SERPL-MCNC: 0.3 MG/DL (ref 0–1.2)
BILIRUB UR QL: NEGATIVE
BUN SERPL-MCNC: 16 MG/DL (ref 6–20)
BUN/CREAT SERPL: 18 (ref 12–20)
CALCIUM SERPL-MCNC: 10.3 MG/DL (ref 8.6–10)
CANNABINOIDS UR QL SCN: NEGATIVE
CHLORIDE SERPL-SCNC: 103 MMOL/L (ref 98–107)
CO2 SERPL-SCNC: 24 MMOL/L (ref 20–29)
COCAINE UR QL SCN: POSITIVE
COLOR UR: ABNORMAL
CREAT SERPL-MCNC: 0.87 MG/DL (ref 0.6–1)
D DIMER PPP FEU-MCNC: 0.26 MG/L FEU (ref 0–0.65)
EPITH CASTS URNS QL MICRO: ABNORMAL /LPF
ERYTHROCYTE [DISTWIDTH] IN BLOOD BY AUTOMATED COUNT: 12.6 % (ref 11.5–14.5)
FENTANYL: POSITIVE
GLOBULIN SER CALC-MCNC: 4 G/DL (ref 2–4)
GLUCOSE SERPL-MCNC: 115 MG/DL (ref 65–100)
GLUCOSE UR STRIP.AUTO-MCNC: NEGATIVE MG/DL
HCG SERPL-ACNC: 1 MIU/ML
HCT VFR BLD AUTO: 38.7 % (ref 35–47)
HGB BLD-MCNC: 13.2 G/DL (ref 11.5–16)
HGB UR QL STRIP: NEGATIVE
KETONES UR QL STRIP.AUTO: ABNORMAL MG/DL
LEUKOCYTE ESTERASE UR QL STRIP.AUTO: NEGATIVE
Lab: ABNORMAL
MCH RBC QN AUTO: 30.2 PG (ref 26–34)
MCHC RBC AUTO-ENTMCNC: 34.1 G/DL (ref 30–36.5)
MCV RBC AUTO: 88.6 FL (ref 80–99)
METHADONE UR QL: NEGATIVE
NITRITE UR QL STRIP.AUTO: NEGATIVE
NRBC # BLD: 0 K/UL (ref 0–0.01)
NRBC BLD-RTO: 0 PER 100 WBC
OPIATES UR QL: POSITIVE
PCP UR QL: NEGATIVE
PH UR STRIP: 5 (ref 5–8)
PLATELET # BLD AUTO: 309 K/UL (ref 150–400)
PMV BLD AUTO: 10.1 FL (ref 8.9–12.9)
POTASSIUM SERPL-SCNC: 3.4 MMOL/L (ref 3.5–5.1)
PROT SERPL-MCNC: 8.1 G/DL (ref 6.4–8.3)
PROT UR STRIP-MCNC: ABNORMAL MG/DL
RBC # BLD AUTO: 4.37 M/UL (ref 3.8–5.2)
RBC #/AREA URNS HPF: ABNORMAL /HPF (ref 0–5)
SODIUM SERPL-SCNC: 141 MMOL/L (ref 136–145)
SP GR UR REFRACTOMETRY: 1.02
TROPONIN T SERPL HS-MCNC: 6.6 NG/L (ref 0–14)
TSH, 3RD GENERATION: 2.08 UIU/ML (ref 0.27–4.2)
URINE CULTURE IF INDICATED: ABNORMAL
UROBILINOGEN UR QL STRIP.AUTO: 1 EU/DL (ref 0.2–1)
WBC # BLD AUTO: 8 K/UL (ref 3.6–11)
WBC URNS QL MICRO: ABNORMAL /HPF (ref 0–4)

## 2025-09-06 PROCEDURE — 85027 COMPLETE CBC AUTOMATED: CPT

## 2025-09-06 PROCEDURE — 2580000003 HC RX 258: Performed by: EMERGENCY MEDICINE

## 2025-09-06 PROCEDURE — 85379 FIBRIN DEGRADATION QUANT: CPT

## 2025-09-06 PROCEDURE — 6360000002 HC RX W HCPCS: Performed by: EMERGENCY MEDICINE

## 2025-09-06 PROCEDURE — 71045 X-RAY EXAM CHEST 1 VIEW: CPT

## 2025-09-06 PROCEDURE — 81001 URINALYSIS AUTO W/SCOPE: CPT

## 2025-09-06 PROCEDURE — 84484 ASSAY OF TROPONIN QUANT: CPT

## 2025-09-06 PROCEDURE — 6370000000 HC RX 637 (ALT 250 FOR IP): Performed by: EMERGENCY MEDICINE

## 2025-09-06 PROCEDURE — 80053 COMPREHEN METABOLIC PANEL: CPT

## 2025-09-06 PROCEDURE — 36415 COLL VENOUS BLD VENIPUNCTURE: CPT

## 2025-09-06 PROCEDURE — 84443 ASSAY THYROID STIM HORMONE: CPT

## 2025-09-06 PROCEDURE — 84702 CHORIONIC GONADOTROPIN TEST: CPT

## 2025-09-06 RX ORDER — KETOROLAC TROMETHAMINE 30 MG/ML
30 INJECTION, SOLUTION INTRAMUSCULAR; INTRAVENOUS
Status: COMPLETED | OUTPATIENT
Start: 2025-09-06 | End: 2025-09-06

## 2025-09-06 RX ORDER — DIAZEPAM 5 MG/1
5 TABLET ORAL ONCE
Status: COMPLETED | OUTPATIENT
Start: 2025-09-06 | End: 2025-09-06

## 2025-09-06 RX ORDER — CLONIDINE 0.2 MG/24H
1 PATCH, EXTENDED RELEASE TRANSDERMAL WEEKLY
Qty: 4 PATCH | Refills: 0 | Status: SHIPPED | OUTPATIENT
Start: 2025-09-06

## 2025-09-06 RX ORDER — HYDROXYZINE HYDROCHLORIDE 25 MG/1
25 TABLET, FILM COATED ORAL EVERY 8 HOURS PRN
Qty: 30 TABLET | Refills: 0 | Status: SHIPPED | OUTPATIENT
Start: 2025-09-06 | End: 2025-09-16

## 2025-09-06 RX ORDER — CLONIDINE 0.2 MG/24H
1 PATCH, EXTENDED RELEASE TRANSDERMAL WEEKLY
Qty: 4 PATCH | Refills: 0 | Status: SHIPPED | OUTPATIENT
Start: 2025-09-06 | End: 2025-09-06

## 2025-09-06 RX ORDER — POTASSIUM CHLORIDE 750 MG/1
40 TABLET, EXTENDED RELEASE ORAL ONCE
Status: COMPLETED | OUTPATIENT
Start: 2025-09-06 | End: 2025-09-06

## 2025-09-06 RX ORDER — 0.9 % SODIUM CHLORIDE 0.9 %
1000 INTRAVENOUS SOLUTION INTRAVENOUS ONCE
Status: COMPLETED | OUTPATIENT
Start: 2025-09-06 | End: 2025-09-06

## 2025-09-06 RX ORDER — HYDROXYZINE HYDROCHLORIDE 25 MG/1
25 TABLET, FILM COATED ORAL EVERY 8 HOURS PRN
Qty: 30 TABLET | Refills: 0 | Status: SHIPPED | OUTPATIENT
Start: 2025-09-06 | End: 2025-09-06

## 2025-09-06 RX ORDER — CLONIDINE HYDROCHLORIDE 0.1 MG/1
0.2 TABLET ORAL
Status: DISCONTINUED | OUTPATIENT
Start: 2025-09-06 | End: 2025-09-06

## 2025-09-06 RX ADMIN — POTASSIUM CHLORIDE 40 MEQ: 750 TABLET, FILM COATED, EXTENDED RELEASE ORAL at 07:37

## 2025-09-06 RX ADMIN — DIAZEPAM 5 MG: 5 TABLET ORAL at 06:23

## 2025-09-06 RX ADMIN — KETOROLAC TROMETHAMINE 30 MG: 30 INJECTION, SOLUTION INTRAMUSCULAR at 06:24

## 2025-09-06 RX ADMIN — SODIUM CHLORIDE 1000 ML: 0.9 INJECTION, SOLUTION INTRAVENOUS at 06:23

## 2025-09-06 ASSESSMENT — PAIN DESCRIPTION - LOCATION: LOCATION: FLANK

## 2025-09-06 ASSESSMENT — ENCOUNTER SYMPTOMS
VOMITING: 0
RHINORRHEA: 0
COUGH: 0
DIARRHEA: 0
NAUSEA: 0
SORE THROAT: 0
SHORTNESS OF BREATH: 0
ABDOMINAL PAIN: 0
EYE PAIN: 0

## 2025-09-06 ASSESSMENT — PAIN - FUNCTIONAL ASSESSMENT: PAIN_FUNCTIONAL_ASSESSMENT: 0-10

## 2025-09-06 ASSESSMENT — PAIN SCALES - GENERAL: PAINLEVEL_OUTOF10: 10

## 2025-09-06 ASSESSMENT — PAIN DESCRIPTION - PAIN TYPE: TYPE: ACUTE PAIN

## 2025-09-06 ASSESSMENT — PAIN DESCRIPTION - DESCRIPTORS: DESCRIPTORS: ACHING

## 2025-09-06 ASSESSMENT — PAIN DESCRIPTION - ORIENTATION: ORIENTATION: RIGHT
